# Patient Record
Sex: FEMALE | Race: WHITE | Employment: OTHER
[De-identification: names, ages, dates, MRNs, and addresses within clinical notes are randomized per-mention and may not be internally consistent; named-entity substitution may affect disease eponyms.]

---

## 2017-03-01 ENCOUNTER — TELEPHONE (OUTPATIENT)
Dept: PULMONOLOGY | Facility: CLINIC | Age: 62
End: 2017-03-01

## 2017-03-01 RX ORDER — LEVALBUTEROL INHALATION SOLUTION 0.63 MG/3ML
0.63 SOLUTION RESPIRATORY (INHALATION) EVERY 6 HOURS PRN
Qty: 100 VIAL | Refills: 3 | Status: SHIPPED | OUTPATIENT
Start: 2017-03-01 | End: 2018-07-11

## 2017-03-02 ENCOUNTER — TELEPHONE (OUTPATIENT)
Dept: PULMONOLOGY | Facility: CLINIC | Age: 62
End: 2017-03-02

## 2017-03-02 DIAGNOSIS — R06.02 SHORTNESS OF BREATH: Primary | ICD-10-CM

## 2017-03-03 ENCOUNTER — TELEPHONE (OUTPATIENT)
Dept: PULMONOLOGY | Facility: CLINIC | Age: 62
End: 2017-03-03

## 2017-03-03 DIAGNOSIS — J45.40 MODERATE PERSISTENT ASTHMA WITHOUT COMPLICATION: Primary | ICD-10-CM

## 2017-03-14 ENCOUNTER — OFFICE VISIT (OUTPATIENT)
Dept: PULMONOLOGY | Age: 62
End: 2017-03-14
Payer: COMMERCIAL

## 2017-03-14 ENCOUNTER — HOSPITAL ENCOUNTER (OUTPATIENT)
Age: 62
Setting detail: SPECIMEN
Discharge: HOME | End: 2017-03-14
Payer: COMMERCIAL

## 2017-03-14 VITALS
BODY MASS INDEX: 47.09 KG/M2 | SYSTOLIC BLOOD PRESSURE: 122 MMHG | WEIGHT: 293 LBS | RESPIRATION RATE: 16 BRPM | HEART RATE: 65 BPM | DIASTOLIC BLOOD PRESSURE: 76 MMHG | OXYGEN SATURATION: 98 % | HEIGHT: 66 IN

## 2017-03-14 DIAGNOSIS — J45.991 ASTHMA, COUGH VARIANT: ICD-10-CM

## 2017-03-14 DIAGNOSIS — R05.3 CHRONIC COUGH: Primary | ICD-10-CM

## 2017-03-14 DIAGNOSIS — J30.9 ALLERGIC RHINITIS, UNSPECIFIED ALLERGIC RHINITIS TRIGGER, UNSPECIFIED RHINITIS SEASONALITY: ICD-10-CM

## 2017-03-14 DIAGNOSIS — G47.33 OSA ON CPAP: ICD-10-CM

## 2017-03-14 DIAGNOSIS — Z99.89 OSA ON CPAP: ICD-10-CM

## 2017-03-14 DIAGNOSIS — R05.3 CHRONIC COUGH: ICD-10-CM

## 2017-03-14 DIAGNOSIS — K21.9 GASTROESOPHAGEAL REFLUX DISEASE WITHOUT ESOPHAGITIS: ICD-10-CM

## 2017-03-14 LAB
ABSOLUTE EOS #: 0.2 K/UL (ref 0–0.4)
ABSOLUTE LYMPH #: 2.8 K/UL (ref 1–4.8)
ABSOLUTE MONO #: 0.6 K/UL (ref 0.1–1.2)
BASOPHILS # BLD: 0 % (ref 0–2)
BASOPHILS ABSOLUTE: 0 K/UL (ref 0–0.2)
DIFFERENTIAL TYPE: ABNORMAL
EOSINOPHILS RELATIVE PERCENT: 3 % (ref 1–4)
HCT VFR BLD CALC: 45.5 % (ref 36–46)
HEMOGLOBIN: 15.3 G/DL (ref 12–16)
IGE: 18 IU/ML
LYMPHOCYTES # BLD: 30 % (ref 24–44)
MCH RBC QN AUTO: 28.9 PG (ref 26–34)
MCHC RBC AUTO-ENTMCNC: 33.6 G/DL (ref 31–37)
MCV RBC AUTO: 85.8 FL (ref 80–100)
MONOCYTES # BLD: 7 % (ref 2–11)
PDW BLD-RTO: 15 % (ref 12.5–15.4)
PLATELET # BLD: 166 K/UL (ref 140–450)
PLATELET ESTIMATE: ABNORMAL
PMV BLD AUTO: 9.9 FL (ref 6–12)
RBC # BLD: 5.31 M/UL (ref 4–5.2)
RBC # BLD: ABNORMAL 10*6/UL
SEG NEUTROPHILS: 60 % (ref 36–66)
SEGMENTED NEUTROPHILS ABSOLUTE COUNT: 5.7 K/UL (ref 1.8–7.7)
WBC # BLD: 9.4 K/UL (ref 3.5–11)
WBC # BLD: ABNORMAL 10*3/UL

## 2017-03-14 PROCEDURE — 99214 OFFICE O/P EST MOD 30 MIN: CPT | Performed by: INTERNAL MEDICINE

## 2017-03-14 RX ORDER — AZELASTINE 1 MG/ML
2 SPRAY, METERED NASAL 2 TIMES DAILY
Qty: 1 BOTTLE | Refills: 3 | Status: SHIPPED | OUTPATIENT
Start: 2017-03-14 | End: 2017-07-11

## 2017-03-14 RX ORDER — MONTELUKAST SODIUM 10 MG/1
10 TABLET ORAL DAILY
Qty: 30 TABLET | Refills: 11 | Status: SHIPPED | OUTPATIENT
Start: 2017-03-14 | End: 2017-07-11

## 2017-04-25 ENCOUNTER — OFFICE VISIT (OUTPATIENT)
Dept: PULMONOLOGY | Age: 62
End: 2017-04-25
Payer: COMMERCIAL

## 2017-04-25 VITALS
SYSTOLIC BLOOD PRESSURE: 118 MMHG | WEIGHT: 293 LBS | HEART RATE: 51 BPM | DIASTOLIC BLOOD PRESSURE: 60 MMHG | HEIGHT: 66 IN | OXYGEN SATURATION: 96 % | TEMPERATURE: 97.4 F | RESPIRATION RATE: 16 BRPM | BODY MASS INDEX: 47.09 KG/M2

## 2017-04-25 DIAGNOSIS — K21.9 GASTROESOPHAGEAL REFLUX DISEASE WITHOUT ESOPHAGITIS: ICD-10-CM

## 2017-04-25 DIAGNOSIS — J30.9 ALLERGIC RHINITIS, UNSPECIFIED ALLERGIC RHINITIS TRIGGER, UNSPECIFIED RHINITIS SEASONALITY: ICD-10-CM

## 2017-04-25 DIAGNOSIS — J45.991 COUGH VARIANT ASTHMA: Primary | ICD-10-CM

## 2017-04-25 DIAGNOSIS — G47.33 OSA ON CPAP: ICD-10-CM

## 2017-04-25 DIAGNOSIS — Z99.89 OSA ON CPAP: ICD-10-CM

## 2017-04-25 PROCEDURE — 99214 OFFICE O/P EST MOD 30 MIN: CPT | Performed by: INTERNAL MEDICINE

## 2017-04-25 ASSESSMENT — SLEEP AND FATIGUE QUESTIONNAIRES
HOW LIKELY ARE YOU TO NOD OFF OR FALL ASLEEP WHILE WATCHING TV: 0
HOW LIKELY ARE YOU TO NOD OFF OR FALL ASLEEP IN A CAR, WHILE STOPPED FOR A FEW MINUTES IN TRAFFIC: 0
ESS TOTAL SCORE: 0
HOW LIKELY ARE YOU TO NOD OFF OR FALL ASLEEP WHILE SITTING INACTIVE IN A PUBLIC PLACE: 0
HOW LIKELY ARE YOU TO NOD OFF OR FALL ASLEEP WHILE SITTING QUIETLY AFTER LUNCH WITHOUT ALCOHOL: 0
HOW LIKELY ARE YOU TO NOD OFF OR FALL ASLEEP WHILE LYING DOWN TO REST IN THE AFTERNOON WHEN CIRCUMSTANCES PERMIT: 0
HOW LIKELY ARE YOU TO NOD OFF OR FALL ASLEEP WHILE SITTING AND TALKING TO SOMEONE: 0
HOW LIKELY ARE YOU TO NOD OFF OR FALL ASLEEP WHEN YOU ARE A PASSENGER IN A CAR FOR AN HOUR WITHOUT A BREAK: 0
HOW LIKELY ARE YOU TO NOD OFF OR FALL ASLEEP WHILE SITTING AND READING: 0

## 2017-05-01 ENCOUNTER — APPOINTMENT (OUTPATIENT)
Dept: CT IMAGING | Age: 62
End: 2017-05-01
Payer: COMMERCIAL

## 2017-05-01 ENCOUNTER — HOSPITAL ENCOUNTER (EMERGENCY)
Age: 62
Discharge: HOME | End: 2017-05-02
Attending: EMERGENCY MEDICINE
Payer: COMMERCIAL

## 2017-05-01 VITALS
WEIGHT: 293 LBS | SYSTOLIC BLOOD PRESSURE: 135 MMHG | OXYGEN SATURATION: 97 % | RESPIRATION RATE: 22 BRPM | TEMPERATURE: 98 F | DIASTOLIC BLOOD PRESSURE: 54 MMHG | BODY MASS INDEX: 47.09 KG/M2 | HEIGHT: 66 IN | HEART RATE: 59 BPM

## 2017-05-01 DIAGNOSIS — R10.9 NONSPECIFIC ABDOMINAL PAIN: Primary | ICD-10-CM

## 2017-05-01 LAB
-: ABNORMAL
ABSOLUTE EOS #: 0.3 K/UL (ref 0–0.4)
ABSOLUTE LYMPH #: 2.7 K/UL (ref 1–4.8)
ABSOLUTE MONO #: 0.6 K/UL (ref 0.2–0.8)
AMORPHOUS: ABNORMAL
ANION GAP SERPL CALCULATED.3IONS-SCNC: 15 MMOL/L (ref 9–17)
BACTERIA: ABNORMAL
BASOPHILS # BLD: 0 %
BASOPHILS ABSOLUTE: 0 K/UL (ref 0–0.2)
BILIRUBIN URINE: NEGATIVE
BUN BLDV-MCNC: 21 MG/DL (ref 8–23)
BUN/CREAT BLD: 26 (ref 9–20)
CALCIUM SERPL-MCNC: 9.1 MG/DL (ref 8.6–10.4)
CASTS UA: ABNORMAL /LPF
CHLORIDE BLD-SCNC: 97 MMOL/L (ref 98–107)
CO2: 24 MMOL/L (ref 20–31)
COLOR: YELLOW
COMMENT UA: ABNORMAL
CREAT SERPL-MCNC: 0.81 MG/DL (ref 0.5–0.9)
CRYSTALS, UA: ABNORMAL /HPF
DIFFERENTIAL TYPE: ABNORMAL
EOSINOPHILS RELATIVE PERCENT: 3 %
EPITHELIAL CELLS UA: ABNORMAL /HPF
GFR AFRICAN AMERICAN: >60 ML/MIN
GFR NON-AFRICAN AMERICAN: >60 ML/MIN
GFR SERPL CREATININE-BSD FRML MDRD: ABNORMAL ML/MIN/{1.73_M2}
GFR SERPL CREATININE-BSD FRML MDRD: ABNORMAL ML/MIN/{1.73_M2}
GLUCOSE BLD-MCNC: 111 MG/DL (ref 70–99)
GLUCOSE URINE: NEGATIVE
HCT VFR BLD CALC: 44.1 % (ref 36–46)
HEMOGLOBIN: 14.9 G/DL (ref 12–16)
KETONES, URINE: NEGATIVE
LEUKOCYTE ESTERASE, URINE: NEGATIVE
LYMPHOCYTES # BLD: 32 %
MCH RBC QN AUTO: 29.2 PG (ref 26–34)
MCHC RBC AUTO-ENTMCNC: 33.8 G/DL (ref 31–37)
MCV RBC AUTO: 86.4 FL (ref 80–100)
MONOCYTES # BLD: 7 %
MUCUS: ABNORMAL
NITRITE, URINE: NEGATIVE
OTHER OBSERVATIONS UA: ABNORMAL
PDW BLD-RTO: 15.1 % (ref 11.5–14.5)
PH UA: 6 (ref 5–8)
PLATELET # BLD: 145 K/UL (ref 130–400)
PLATELET ESTIMATE: ABNORMAL
PMV BLD AUTO: 9.9 FL (ref 6–12)
POTASSIUM SERPL-SCNC: 4 MMOL/L (ref 3.7–5.3)
PROTEIN UA: NEGATIVE
RBC # BLD: 5.11 M/UL (ref 4–5.2)
RBC # BLD: ABNORMAL 10*6/UL
RBC UA: ABNORMAL /HPF (ref 0–2)
RENAL EPITHELIAL, UA: ABNORMAL /HPF
SEG NEUTROPHILS: 58 %
SEGMENTED NEUTROPHILS ABSOLUTE COUNT: 4.8 K/UL (ref 1.8–7.7)
SODIUM BLD-SCNC: 136 MMOL/L (ref 135–144)
SPECIFIC GRAVITY UA: 1.01 (ref 1–1.03)
TRICHOMONAS: ABNORMAL
TURBIDITY: CLEAR
URINE HGB: ABNORMAL
UROBILINOGEN, URINE: NORMAL
WBC # BLD: 8.4 K/UL (ref 3.5–11)
WBC # BLD: ABNORMAL 10*3/UL
WBC UA: ABNORMAL /HPF (ref 0–5)
YEAST: ABNORMAL

## 2017-05-01 PROCEDURE — 80048 BASIC METABOLIC PNL TOTAL CA: CPT

## 2017-05-01 PROCEDURE — 6360000002 HC RX W HCPCS: Performed by: EMERGENCY MEDICINE

## 2017-05-01 PROCEDURE — 74177 CT ABD & PELVIS W/CONTRAST: CPT

## 2017-05-01 PROCEDURE — 85025 COMPLETE CBC W/AUTO DIFF WBC: CPT

## 2017-05-01 PROCEDURE — 96375 TX/PRO/DX INJ NEW DRUG ADDON: CPT

## 2017-05-01 PROCEDURE — 81001 URINALYSIS AUTO W/SCOPE: CPT

## 2017-05-01 PROCEDURE — 99284 EMERGENCY DEPT VISIT MOD MDM: CPT

## 2017-05-01 PROCEDURE — 96374 THER/PROPH/DIAG INJ IV PUSH: CPT

## 2017-05-01 PROCEDURE — 6360000004 HC RX CONTRAST MEDICATION: Performed by: EMERGENCY MEDICINE

## 2017-05-01 RX ORDER — ONDANSETRON 2 MG/ML
4 INJECTION INTRAMUSCULAR; INTRAVENOUS ONCE
Status: COMPLETED | OUTPATIENT
Start: 2017-05-01 | End: 2017-05-01

## 2017-05-01 RX ADMIN — ONDANSETRON 4 MG: 2 INJECTION INTRAMUSCULAR; INTRAVENOUS at 22:52

## 2017-05-01 RX ADMIN — HYDROMORPHONE HYDROCHLORIDE 1 MG: 1 INJECTION, SOLUTION INTRAMUSCULAR; INTRAVENOUS; SUBCUTANEOUS at 22:52

## 2017-05-01 RX ADMIN — IOVERSOL 125 ML: 741 INJECTION INTRA-ARTERIAL; INTRAVENOUS at 23:31

## 2017-05-01 ASSESSMENT — PAIN SCALES - GENERAL: PAINLEVEL_OUTOF10: 8

## 2017-05-01 ASSESSMENT — PAIN DESCRIPTION - PAIN TYPE: TYPE: ACUTE PAIN

## 2017-05-01 ASSESSMENT — PAIN DESCRIPTION - LOCATION: LOCATION: ABDOMEN

## 2017-05-01 ASSESSMENT — PAIN DESCRIPTION - ORIENTATION: ORIENTATION: RIGHT;UPPER

## 2017-05-02 RX ORDER — DICYCLOMINE HYDROCHLORIDE 10 MG/1
10 CAPSULE ORAL EVERY 6 HOURS PRN
Qty: 20 CAPSULE | Refills: 0 | Status: SHIPPED | OUTPATIENT
Start: 2017-05-02 | End: 2017-07-11

## 2017-05-02 RX ORDER — ONDANSETRON 4 MG/1
4 TABLET, ORALLY DISINTEGRATING ORAL EVERY 8 HOURS PRN
Qty: 20 TABLET | Refills: 0 | Status: SHIPPED | OUTPATIENT
Start: 2017-05-02 | End: 2018-01-03 | Stop reason: ALTCHOICE

## 2017-05-09 ENCOUNTER — HOSPITAL ENCOUNTER (OUTPATIENT)
Dept: ULTRASOUND IMAGING | Age: 62
Discharge: HOME | End: 2017-05-09
Payer: COMMERCIAL

## 2017-05-09 ENCOUNTER — HOSPITAL ENCOUNTER (OUTPATIENT)
Age: 62
Discharge: HOME | End: 2017-05-09
Payer: COMMERCIAL

## 2017-05-09 DIAGNOSIS — E07.9 THYROID MASS: ICD-10-CM

## 2017-05-09 LAB
T3 TOTAL: 148 NG/DL (ref 80–200)
T4 TOTAL: 8.3 UG/DL (ref 4.5–12)
TSH SERPL DL<=0.05 MIU/L-ACNC: 8.42 MIU/L (ref 0.3–5)

## 2017-05-09 PROCEDURE — 84436 ASSAY OF TOTAL THYROXINE: CPT

## 2017-05-09 PROCEDURE — 36415 COLL VENOUS BLD VENIPUNCTURE: CPT

## 2017-05-09 PROCEDURE — 84480 ASSAY TRIIODOTHYRONINE (T3): CPT

## 2017-05-09 PROCEDURE — 84443 ASSAY THYROID STIM HORMONE: CPT

## 2017-05-09 PROCEDURE — 76536 US EXAM OF HEAD AND NECK: CPT

## 2017-06-05 ENCOUNTER — HOSPITAL ENCOUNTER (EMERGENCY)
Age: 62
Discharge: HOME | End: 2017-06-05
Attending: EMERGENCY MEDICINE
Payer: COMMERCIAL

## 2017-06-05 ENCOUNTER — APPOINTMENT (OUTPATIENT)
Dept: GENERAL RADIOLOGY | Age: 62
End: 2017-06-05
Payer: COMMERCIAL

## 2017-06-05 VITALS
TEMPERATURE: 97.7 F | HEIGHT: 66 IN | DIASTOLIC BLOOD PRESSURE: 69 MMHG | BODY MASS INDEX: 47.09 KG/M2 | HEART RATE: 66 BPM | RESPIRATION RATE: 16 BRPM | WEIGHT: 293 LBS | SYSTOLIC BLOOD PRESSURE: 151 MMHG | OXYGEN SATURATION: 96 %

## 2017-06-05 DIAGNOSIS — S80.812A ABRASION, LEFT LOWER LEG, INITIAL ENCOUNTER: ICD-10-CM

## 2017-06-05 DIAGNOSIS — S86.912A KNEE STRAIN, LEFT, INITIAL ENCOUNTER: Primary | ICD-10-CM

## 2017-06-05 PROCEDURE — 73562 X-RAY EXAM OF KNEE 3: CPT

## 2017-06-05 PROCEDURE — 90471 IMMUNIZATION ADMIN: CPT | Performed by: EMERGENCY MEDICINE

## 2017-06-05 PROCEDURE — 90715 TDAP VACCINE 7 YRS/> IM: CPT | Performed by: EMERGENCY MEDICINE

## 2017-06-05 PROCEDURE — 6360000002 HC RX W HCPCS: Performed by: EMERGENCY MEDICINE

## 2017-06-05 PROCEDURE — 99283 EMERGENCY DEPT VISIT LOW MDM: CPT

## 2017-06-05 PROCEDURE — 73590 X-RAY EXAM OF LOWER LEG: CPT

## 2017-06-05 RX ORDER — MELOXICAM 15 MG/1
15 TABLET ORAL DAILY
COMMUNITY
End: 2018-12-10 | Stop reason: ALTCHOICE

## 2017-06-05 RX ORDER — DOXYCYCLINE 100 MG/1
100 TABLET ORAL 2 TIMES DAILY
Qty: 14 TABLET | Refills: 0 | Status: SHIPPED | OUTPATIENT
Start: 2017-06-05 | End: 2017-06-15

## 2017-06-05 RX ORDER — LEVOTHYROXINE SODIUM 0.03 MG/1
50 TABLET ORAL DAILY
COMMUNITY
End: 2022-04-04

## 2017-06-05 RX ADMIN — TETANUS TOXOID, REDUCED DIPHTHERIA TOXOID AND ACELLULAR PERTUSSIS VACCINE, ADSORBED 0.5 ML: 5; 2.5; 8; 8; 2.5 SUSPENSION INTRAMUSCULAR at 21:31

## 2017-06-05 ASSESSMENT — PAIN SCALES - GENERAL: PAINLEVEL_OUTOF10: 9

## 2017-06-05 ASSESSMENT — PAIN DESCRIPTION - DESCRIPTORS: DESCRIPTORS: ACHING;SHARP;SHOOTING;STABBING;THROBBING

## 2017-06-05 ASSESSMENT — PAIN DESCRIPTION - ORIENTATION: ORIENTATION: LEFT;LOWER;ANTERIOR

## 2017-06-05 ASSESSMENT — PAIN DESCRIPTION - LOCATION: LOCATION: LEG

## 2017-06-05 ASSESSMENT — PAIN DESCRIPTION - PAIN TYPE: TYPE: ACUTE PAIN

## 2017-06-22 ENCOUNTER — APPOINTMENT (OUTPATIENT)
Dept: GENERAL RADIOLOGY | Age: 62
End: 2017-06-22
Payer: COMMERCIAL

## 2017-06-22 ENCOUNTER — HOSPITAL ENCOUNTER (EMERGENCY)
Age: 62
Discharge: HOME | End: 2017-06-22
Payer: COMMERCIAL

## 2017-06-22 VITALS
BODY MASS INDEX: 47.09 KG/M2 | WEIGHT: 293 LBS | OXYGEN SATURATION: 98 % | DIASTOLIC BLOOD PRESSURE: 80 MMHG | HEART RATE: 66 BPM | TEMPERATURE: 97.6 F | RESPIRATION RATE: 18 BRPM | SYSTOLIC BLOOD PRESSURE: 148 MMHG | HEIGHT: 66 IN

## 2017-06-22 DIAGNOSIS — G89.29 CHRONIC PAIN OF RIGHT KNEE: Primary | ICD-10-CM

## 2017-06-22 DIAGNOSIS — M25.561 CHRONIC PAIN OF RIGHT KNEE: Primary | ICD-10-CM

## 2017-06-22 PROCEDURE — 96372 THER/PROPH/DIAG INJ SC/IM: CPT

## 2017-06-22 PROCEDURE — 99283 EMERGENCY DEPT VISIT LOW MDM: CPT

## 2017-06-22 PROCEDURE — 6360000002 HC RX W HCPCS: Performed by: NURSE PRACTITIONER

## 2017-06-22 PROCEDURE — 73562 X-RAY EXAM OF KNEE 3: CPT

## 2017-06-22 RX ORDER — ORPHENADRINE CITRATE 30 MG/ML
60 INJECTION INTRAMUSCULAR; INTRAVENOUS ONCE
Status: COMPLETED | OUTPATIENT
Start: 2017-06-22 | End: 2017-06-22

## 2017-06-22 RX ORDER — KETOROLAC TROMETHAMINE 30 MG/ML
30 INJECTION, SOLUTION INTRAMUSCULAR; INTRAVENOUS ONCE
Status: COMPLETED | OUTPATIENT
Start: 2017-06-22 | End: 2017-06-22

## 2017-06-22 RX ADMIN — ORPHENADRINE CITRATE 60 MG: 30 INJECTION INTRAMUSCULAR; INTRAVENOUS at 17:41

## 2017-06-22 RX ADMIN — KETOROLAC TROMETHAMINE 30 MG: 30 INJECTION, SOLUTION INTRAMUSCULAR at 17:42

## 2017-06-22 ASSESSMENT — PAIN SCALES - GENERAL
PAINLEVEL_OUTOF10: 8
PAINLEVEL_OUTOF10: 8

## 2017-06-22 ASSESSMENT — PAIN DESCRIPTION - PAIN TYPE: TYPE: CHRONIC PAIN

## 2017-07-11 ENCOUNTER — HOSPITAL ENCOUNTER (OUTPATIENT)
Age: 62
Setting detail: OBSERVATION
Discharge: HOME | End: 2017-07-13
Attending: EMERGENCY MEDICINE | Admitting: FAMILY MEDICINE
Payer: COMMERCIAL

## 2017-07-11 ENCOUNTER — APPOINTMENT (OUTPATIENT)
Dept: GENERAL RADIOLOGY | Age: 62
End: 2017-07-11
Payer: COMMERCIAL

## 2017-07-11 DIAGNOSIS — R07.9 CHEST PAIN, UNSPECIFIED TYPE: Primary | ICD-10-CM

## 2017-07-11 LAB
ABSOLUTE EOS #: 0.2 K/UL (ref 0–0.4)
ABSOLUTE LYMPH #: 2.2 K/UL (ref 1–4.8)
ABSOLUTE MONO #: 0.4 K/UL (ref 0.2–0.8)
ANION GAP SERPL CALCULATED.3IONS-SCNC: 14 MMOL/L (ref 9–17)
BASOPHILS # BLD: 1 %
BASOPHILS ABSOLUTE: 0 K/UL (ref 0–0.2)
BUN BLDV-MCNC: 23 MG/DL (ref 8–23)
BUN/CREAT BLD: 30 (ref 9–20)
CALCIUM SERPL-MCNC: 9.1 MG/DL (ref 8.6–10.4)
CHLORIDE BLD-SCNC: 101 MMOL/L (ref 98–107)
CO2: 25 MMOL/L (ref 20–31)
CREAT SERPL-MCNC: 0.76 MG/DL (ref 0.5–0.9)
DIFFERENTIAL TYPE: ABNORMAL
EOSINOPHILS RELATIVE PERCENT: 3 %
GFR AFRICAN AMERICAN: >60 ML/MIN
GFR NON-AFRICAN AMERICAN: >60 ML/MIN
GFR SERPL CREATININE-BSD FRML MDRD: ABNORMAL ML/MIN/{1.73_M2}
GFR SERPL CREATININE-BSD FRML MDRD: ABNORMAL ML/MIN/{1.73_M2}
GLUCOSE BLD-MCNC: 128 MG/DL (ref 70–99)
HCT VFR BLD CALC: 43.1 % (ref 36–46)
HEMOGLOBIN: 14.6 G/DL (ref 12–16)
LYMPHOCYTES # BLD: 34 %
MCH RBC QN AUTO: 29.7 PG (ref 26–34)
MCHC RBC AUTO-ENTMCNC: 33.9 G/DL (ref 31–37)
MCV RBC AUTO: 87.5 FL (ref 80–100)
MONOCYTES # BLD: 6 %
PDW BLD-RTO: 15.1 % (ref 11.5–14.5)
PLATELET # BLD: 164 K/UL (ref 130–400)
PLATELET ESTIMATE: ABNORMAL
PMV BLD AUTO: 10.4 FL (ref 6–12)
POTASSIUM SERPL-SCNC: 4.1 MMOL/L (ref 3.7–5.3)
RBC # BLD: 4.93 M/UL (ref 4–5.2)
RBC # BLD: ABNORMAL 10*6/UL
SEG NEUTROPHILS: 56 %
SEGMENTED NEUTROPHILS ABSOLUTE COUNT: 3.7 K/UL (ref 1.8–7.7)
SODIUM BLD-SCNC: 140 MMOL/L (ref 135–144)
TROPONIN INTERP: NORMAL
TROPONIN INTERP: NORMAL
TROPONIN T: <0.03 NG/ML
TROPONIN T: <0.03 NG/ML
WBC # BLD: 6.6 K/UL (ref 3.5–11)
WBC # BLD: ABNORMAL 10*3/UL

## 2017-07-11 PROCEDURE — 6370000000 HC RX 637 (ALT 250 FOR IP): Performed by: EMERGENCY MEDICINE

## 2017-07-11 PROCEDURE — 84484 ASSAY OF TROPONIN QUANT: CPT

## 2017-07-11 PROCEDURE — G0378 HOSPITAL OBSERVATION PER HR: HCPCS

## 2017-07-11 PROCEDURE — 85025 COMPLETE CBC W/AUTO DIFF WBC: CPT

## 2017-07-11 PROCEDURE — 71020 XR CHEST STANDARD TWO VW: CPT

## 2017-07-11 PROCEDURE — 80048 BASIC METABOLIC PNL TOTAL CA: CPT

## 2017-07-11 PROCEDURE — 96374 THER/PROPH/DIAG INJ IV PUSH: CPT

## 2017-07-11 PROCEDURE — 93005 ELECTROCARDIOGRAM TRACING: CPT

## 2017-07-11 PROCEDURE — 36415 COLL VENOUS BLD VENIPUNCTURE: CPT

## 2017-07-11 PROCEDURE — 99285 EMERGENCY DEPT VISIT HI MDM: CPT

## 2017-07-11 PROCEDURE — 6360000002 HC RX W HCPCS: Performed by: EMERGENCY MEDICINE

## 2017-07-11 RX ORDER — ASPIRIN 81 MG/1
324 TABLET, CHEWABLE ORAL ONCE
Status: COMPLETED | OUTPATIENT
Start: 2017-07-11 | End: 2017-07-11

## 2017-07-11 RX ADMIN — ASPIRIN 81 MG 324 MG: 81 TABLET ORAL at 21:41

## 2017-07-11 RX ADMIN — HYDROMORPHONE HYDROCHLORIDE 1 MG: 1 INJECTION, SOLUTION INTRAMUSCULAR; INTRAVENOUS; SUBCUTANEOUS at 21:42

## 2017-07-11 ASSESSMENT — ENCOUNTER SYMPTOMS
DIARRHEA: 0
BACK PAIN: 0
SHORTNESS OF BREATH: 1
TROUBLE SWALLOWING: 0
CONSTIPATION: 0
VOMITING: 0
COUGH: 0
PHOTOPHOBIA: 0
RHINORRHEA: 0
NAUSEA: 0
ABDOMINAL PAIN: 0
SINUS PRESSURE: 0
BLOOD IN STOOL: 0
SORE THROAT: 0

## 2017-07-11 ASSESSMENT — HEART SCORE: ECG: 0

## 2017-07-11 ASSESSMENT — PAIN SCALES - GENERAL
PAINLEVEL_OUTOF10: 8
PAINLEVEL_OUTOF10: 4
PAINLEVEL_OUTOF10: 8

## 2017-07-11 ASSESSMENT — PAIN DESCRIPTION - PAIN TYPE: TYPE: ACUTE PAIN

## 2017-07-11 ASSESSMENT — PAIN DESCRIPTION - DESCRIPTORS: DESCRIPTORS: OTHER (COMMENT)

## 2017-07-11 ASSESSMENT — PAIN DESCRIPTION - LOCATION: LOCATION: CHEST

## 2017-07-12 ENCOUNTER — APPOINTMENT (OUTPATIENT)
Dept: NUCLEAR MEDICINE | Age: 62
End: 2017-07-12
Payer: COMMERCIAL

## 2017-07-12 LAB
ABSOLUTE EOS #: 0.15 K/UL (ref 0–0.4)
ABSOLUTE LYMPH #: 1.68 K/UL (ref 1–4.8)
ABSOLUTE MONO #: 0.36 K/UL (ref 0.2–0.8)
ANION GAP SERPL CALCULATED.3IONS-SCNC: 15 MMOL/L (ref 9–17)
BASOPHILS # BLD: 1 %
BASOPHILS ABSOLUTE: 0.05 K/UL (ref 0–0.2)
BUN BLDV-MCNC: 20 MG/DL (ref 8–23)
BUN/CREAT BLD: 29 (ref 9–20)
CALCIUM SERPL-MCNC: 8.5 MG/DL (ref 8.6–10.4)
CHLORIDE BLD-SCNC: 101 MMOL/L (ref 98–107)
CHOLESTEROL, FASTING: 175 MG/DL
CHOLESTEROL/HDL RATIO: 4.1
CO2: 20 MMOL/L (ref 20–31)
CREAT SERPL-MCNC: 0.7 MG/DL (ref 0.5–0.9)
DIFFERENTIAL TYPE: ABNORMAL
EKG ATRIAL RATE: 68 BPM
EKG P AXIS: 76 DEGREES
EKG P-R INTERVAL: 140 MS
EKG Q-T INTERVAL: 410 MS
EKG QRS DURATION: 88 MS
EKG QTC CALCULATION (BAZETT): 435 MS
EKG R AXIS: 51 DEGREES
EKG T AXIS: 63 DEGREES
EKG VENTRICULAR RATE: 68 BPM
EOSINOPHILS RELATIVE PERCENT: 3 %
GFR AFRICAN AMERICAN: >60 ML/MIN
GFR NON-AFRICAN AMERICAN: >60 ML/MIN
GFR SERPL CREATININE-BSD FRML MDRD: ABNORMAL ML/MIN/{1.73_M2}
GFR SERPL CREATININE-BSD FRML MDRD: ABNORMAL ML/MIN/{1.73_M2}
GLUCOSE BLD-MCNC: 186 MG/DL (ref 70–99)
HCT VFR BLD CALC: 40 % (ref 36–46)
HDLC SERPL-MCNC: 43 MG/DL
HEMOGLOBIN: 13.7 G/DL (ref 12–16)
LDL CHOLESTEROL: 97 MG/DL (ref 0–130)
LV EF: 65 %
LVEF MODALITY: NORMAL
LYMPHOCYTES # BLD: 33 %
MCH RBC QN AUTO: 29.8 PG (ref 26–34)
MCHC RBC AUTO-ENTMCNC: 34.3 G/DL (ref 31–37)
MCV RBC AUTO: 86.9 FL (ref 80–100)
MONOCYTES # BLD: 7 %
MYOGLOBIN: 30 NG/ML (ref 25–58)
MYOGLOBIN: 36 NG/ML (ref 25–58)
PDW BLD-RTO: 14.2 % (ref 11.5–14.5)
PLATELET # BLD: 117 K/UL (ref 130–400)
PLATELET ESTIMATE: ABNORMAL
PMV BLD AUTO: ABNORMAL FL (ref 6–12)
POTASSIUM SERPL-SCNC: 4.2 MMOL/L (ref 3.7–5.3)
RBC # BLD: 4.6 M/UL (ref 4–5.2)
RBC # BLD: ABNORMAL 10*6/UL
SEG NEUTROPHILS: 56 %
SEGMENTED NEUTROPHILS ABSOLUTE COUNT: 2.86 K/UL (ref 1.8–7.7)
SODIUM BLD-SCNC: 136 MMOL/L (ref 135–144)
TRIGLYCERIDE, FASTING: 173 MG/DL
TROPONIN INTERP: NORMAL
TROPONIN INTERP: NORMAL
TROPONIN T: <0.03 NG/ML
TROPONIN T: <0.03 NG/ML
VLDLC SERPL CALC-MCNC: ABNORMAL MG/DL (ref 1–30)
WBC # BLD: 5.1 K/UL (ref 3.5–11)
WBC # BLD: ABNORMAL 10*3/UL

## 2017-07-12 PROCEDURE — A9500 TC99M SESTAMIBI: HCPCS | Performed by: INTERNAL MEDICINE

## 2017-07-12 PROCEDURE — 36415 COLL VENOUS BLD VENIPUNCTURE: CPT

## 2017-07-12 PROCEDURE — 97165 OT EVAL LOW COMPLEX 30 MIN: CPT

## 2017-07-12 PROCEDURE — G8988 SELF CARE GOAL STATUS: HCPCS

## 2017-07-12 PROCEDURE — 6370000000 HC RX 637 (ALT 250 FOR IP): Performed by: FAMILY MEDICINE

## 2017-07-12 PROCEDURE — 93017 CV STRESS TEST TRACING ONLY: CPT

## 2017-07-12 PROCEDURE — 85025 COMPLETE CBC W/AUTO DIFF WBC: CPT

## 2017-07-12 PROCEDURE — 83874 ASSAY OF MYOGLOBIN: CPT

## 2017-07-12 PROCEDURE — 6360000002 HC RX W HCPCS: Performed by: INTERNAL MEDICINE

## 2017-07-12 PROCEDURE — 84484 ASSAY OF TROPONIN QUANT: CPT

## 2017-07-12 PROCEDURE — G8978 MOBILITY CURRENT STATUS: HCPCS

## 2017-07-12 PROCEDURE — G0378 HOSPITAL OBSERVATION PER HR: HCPCS

## 2017-07-12 PROCEDURE — 94640 AIRWAY INHALATION TREATMENT: CPT

## 2017-07-12 PROCEDURE — 97161 PT EVAL LOW COMPLEX 20 MIN: CPT

## 2017-07-12 PROCEDURE — 96372 THER/PROPH/DIAG INJ SC/IM: CPT

## 2017-07-12 PROCEDURE — G8989 SELF CARE D/C STATUS: HCPCS

## 2017-07-12 PROCEDURE — 80061 LIPID PANEL: CPT

## 2017-07-12 PROCEDURE — 93306 TTE W/DOPPLER COMPLETE: CPT

## 2017-07-12 PROCEDURE — 96376 TX/PRO/DX INJ SAME DRUG ADON: CPT

## 2017-07-12 PROCEDURE — G8979 MOBILITY GOAL STATUS: HCPCS

## 2017-07-12 PROCEDURE — 80048 BASIC METABOLIC PNL TOTAL CA: CPT

## 2017-07-12 PROCEDURE — 3430000000 HC RX DIAGNOSTIC RADIOPHARMACEUTICAL: Performed by: INTERNAL MEDICINE

## 2017-07-12 PROCEDURE — G8987 SELF CARE CURRENT STATUS: HCPCS

## 2017-07-12 PROCEDURE — 6360000002 HC RX W HCPCS: Performed by: FAMILY MEDICINE

## 2017-07-12 PROCEDURE — 2580000003 HC RX 258: Performed by: FAMILY MEDICINE

## 2017-07-12 PROCEDURE — 78452 HT MUSCLE IMAGE SPECT MULT: CPT

## 2017-07-12 RX ORDER — ONDANSETRON 4 MG/1
4 TABLET, ORALLY DISINTEGRATING ORAL EVERY 8 HOURS PRN
Status: DISCONTINUED | OUTPATIENT
Start: 2017-07-12 | End: 2017-07-12 | Stop reason: SDUPTHER

## 2017-07-12 RX ORDER — FAMOTIDINE 20 MG/1
20 TABLET, FILM COATED ORAL 2 TIMES DAILY
Status: DISCONTINUED | OUTPATIENT
Start: 2017-07-12 | End: 2017-07-13 | Stop reason: HOSPADM

## 2017-07-12 RX ORDER — 0.9 % SODIUM CHLORIDE 0.9 %
250 INTRAVENOUS SOLUTION INTRAVENOUS ONCE
Status: DISCONTINUED | OUTPATIENT
Start: 2017-07-12 | End: 2017-07-13 | Stop reason: HOSPADM

## 2017-07-12 RX ORDER — LEVALBUTEROL TARTRATE 45 UG/1
2 AEROSOL, METERED ORAL EVERY 6 HOURS PRN
Status: DISCONTINUED | OUTPATIENT
Start: 2017-07-12 | End: 2017-07-13 | Stop reason: HOSPADM

## 2017-07-12 RX ORDER — ASPIRIN 81 MG/1
81 TABLET ORAL DAILY
Status: DISCONTINUED | OUTPATIENT
Start: 2017-07-12 | End: 2017-07-13 | Stop reason: HOSPADM

## 2017-07-12 RX ORDER — METOPROLOL TARTRATE 5 MG/5ML
2.5 INJECTION INTRAVENOUS PRN
Status: ACTIVE | OUTPATIENT
Start: 2017-07-12 | End: 2017-07-13

## 2017-07-12 RX ORDER — SODIUM CHLORIDE 0.9 % (FLUSH) 0.9 %
10 SYRINGE (ML) INJECTION PRN
Status: DISCONTINUED | OUTPATIENT
Start: 2017-07-12 | End: 2017-07-13 | Stop reason: HOSPADM

## 2017-07-12 RX ORDER — NITROGLYCERIN 0.4 MG/1
0.4 TABLET SUBLINGUAL EVERY 5 MIN PRN
Status: ACTIVE | OUTPATIENT
Start: 2017-07-12 | End: 2017-07-13

## 2017-07-12 RX ORDER — SODIUM CHLORIDE 450 MG/100ML
INJECTION, SOLUTION INTRAVENOUS CONTINUOUS
Status: DISCONTINUED | OUTPATIENT
Start: 2017-07-12 | End: 2017-07-13 | Stop reason: HOSPADM

## 2017-07-12 RX ORDER — ACETAMINOPHEN 325 MG/1
650 TABLET ORAL EVERY 4 HOURS PRN
Status: DISCONTINUED | OUTPATIENT
Start: 2017-07-12 | End: 2017-07-13 | Stop reason: HOSPADM

## 2017-07-12 RX ORDER — LEVOTHYROXINE SODIUM 0.03 MG/1
25 TABLET ORAL DAILY
Status: DISCONTINUED | OUTPATIENT
Start: 2017-07-12 | End: 2017-07-13 | Stop reason: HOSPADM

## 2017-07-12 RX ORDER — MELOXICAM 7.5 MG/1
15 TABLET ORAL DAILY
Status: DISCONTINUED | OUTPATIENT
Start: 2017-07-12 | End: 2017-07-12

## 2017-07-12 RX ORDER — AMINOPHYLLINE DIHYDRATE 25 MG/ML
100 INJECTION, SOLUTION INTRAVENOUS
Status: ACTIVE | OUTPATIENT
Start: 2017-07-12 | End: 2017-07-12

## 2017-07-12 RX ORDER — AZELASTINE 1 MG/ML
2 SPRAY, METERED NASAL 2 TIMES DAILY
Status: DISCONTINUED | OUTPATIENT
Start: 2017-07-12 | End: 2017-07-12

## 2017-07-12 RX ORDER — LORAZEPAM 0.5 MG/1
0.5 TABLET ORAL 2 TIMES DAILY
Status: DISCONTINUED | OUTPATIENT
Start: 2017-07-12 | End: 2017-07-13 | Stop reason: HOSPADM

## 2017-07-12 RX ORDER — PANTOPRAZOLE SODIUM 40 MG/1
40 TABLET, DELAYED RELEASE ORAL
Status: DISCONTINUED | OUTPATIENT
Start: 2017-07-12 | End: 2017-07-13 | Stop reason: HOSPADM

## 2017-07-12 RX ORDER — SODIUM CHLORIDE 0.9 % (FLUSH) 0.9 %
10 SYRINGE (ML) INJECTION EVERY 12 HOURS SCHEDULED
Status: DISCONTINUED | OUTPATIENT
Start: 2017-07-12 | End: 2017-07-12 | Stop reason: SDUPTHER

## 2017-07-12 RX ORDER — SODIUM CHLORIDE 0.9 % (FLUSH) 0.9 %
10 SYRINGE (ML) INJECTION PRN
Status: DISCONTINUED | OUTPATIENT
Start: 2017-07-12 | End: 2017-07-12 | Stop reason: SDUPTHER

## 2017-07-12 RX ORDER — SODIUM CHLORIDE 0.9 % (FLUSH) 0.9 %
10 SYRINGE (ML) INJECTION PRN
Status: ACTIVE | OUTPATIENT
Start: 2017-07-12 | End: 2017-07-13

## 2017-07-12 RX ORDER — ALBUTEROL SULFATE 2.5 MG/3ML
2.5 SOLUTION RESPIRATORY (INHALATION)
Status: DISCONTINUED | OUTPATIENT
Start: 2017-07-12 | End: 2017-07-12

## 2017-07-12 RX ORDER — NAPROXEN 500 MG/1
500 TABLET ORAL 2 TIMES DAILY WITH MEALS
Status: DISCONTINUED | OUTPATIENT
Start: 2017-07-12 | End: 2017-07-13 | Stop reason: HOSPADM

## 2017-07-12 RX ORDER — SODIUM CHLORIDE 0.9 % (FLUSH) 0.9 %
10 SYRINGE (ML) INJECTION EVERY 12 HOURS SCHEDULED
Status: DISCONTINUED | OUTPATIENT
Start: 2017-07-12 | End: 2017-07-13 | Stop reason: HOSPADM

## 2017-07-12 RX ORDER — NITROGLYCERIN 0.4 MG/1
0.4 TABLET SUBLINGUAL EVERY 5 MIN PRN
Status: DISCONTINUED | OUTPATIENT
Start: 2017-07-12 | End: 2017-07-13 | Stop reason: HOSPADM

## 2017-07-12 RX ORDER — ONDANSETRON 2 MG/ML
4 INJECTION INTRAMUSCULAR; INTRAVENOUS EVERY 6 HOURS PRN
Status: DISCONTINUED | OUTPATIENT
Start: 2017-07-12 | End: 2017-07-13 | Stop reason: HOSPADM

## 2017-07-12 RX ORDER — ESCITALOPRAM OXALATE 10 MG/1
10 TABLET ORAL NIGHTLY
Status: DISCONTINUED | OUTPATIENT
Start: 2017-07-12 | End: 2017-07-13 | Stop reason: HOSPADM

## 2017-07-12 RX ORDER — SPIRONOLACTONE 25 MG/1
50 TABLET ORAL DAILY
Status: DISCONTINUED | OUTPATIENT
Start: 2017-07-12 | End: 2017-07-13 | Stop reason: HOSPADM

## 2017-07-12 RX ORDER — ATENOLOL 25 MG/1
12.5 TABLET ORAL NIGHTLY
Status: DISCONTINUED | OUTPATIENT
Start: 2017-07-12 | End: 2017-07-13 | Stop reason: HOSPADM

## 2017-07-12 RX ADMIN — ESCITALOPRAM OXALATE 10 MG: 10 TABLET ORAL at 21:24

## 2017-07-12 RX ADMIN — OLODATEROL RESPIMAT INHALATION SPRAY 2 PUFF: 2.5 SPRAY, METERED RESPIRATORY (INHALATION) at 09:28

## 2017-07-12 RX ADMIN — NAPROXEN 500 MG: 500 TABLET ORAL at 09:57

## 2017-07-12 RX ADMIN — LEVOTHYROXINE SODIUM 25 MCG: 25 TABLET ORAL at 09:01

## 2017-07-12 RX ADMIN — NAPROXEN 500 MG: 500 TABLET ORAL at 17:20

## 2017-07-12 RX ADMIN — ACETAMINOPHEN 650 MG: 325 TABLET ORAL at 19:42

## 2017-07-12 RX ADMIN — Medication 10 ML: at 21:25

## 2017-07-12 RX ADMIN — HYDROMORPHONE HYDROCHLORIDE 1 MG: 1 INJECTION, SOLUTION INTRAMUSCULAR; INTRAVENOUS; SUBCUTANEOUS at 05:27

## 2017-07-12 RX ADMIN — ENOXAPARIN SODIUM 40 MG: 40 INJECTION SUBCUTANEOUS at 09:02

## 2017-07-12 RX ADMIN — ATENOLOL 12.5 MG: 25 TABLET ORAL at 21:24

## 2017-07-12 RX ADMIN — ASPIRIN 81 MG: 81 TABLET, COATED ORAL at 09:02

## 2017-07-12 RX ADMIN — LORAZEPAM 0.5 MG: 0.5 TABLET ORAL at 21:25

## 2017-07-12 RX ADMIN — TETRAKIS(2-METHOXYISOBUTYLISOCYANIDE)COPPER(I) TETRAFLUOROBORATE 32.6 MILLICURIE: 1 INJECTION, POWDER, LYOPHILIZED, FOR SOLUTION INTRAVENOUS at 08:09

## 2017-07-12 RX ADMIN — SPIRONOLACTONE 50 MG: 25 TABLET, FILM COATED ORAL at 09:02

## 2017-07-12 RX ADMIN — REGADENOSON 0.4 MG: 0.08 INJECTION, SOLUTION INTRAVENOUS at 08:04

## 2017-07-12 RX ADMIN — SODIUM CHLORIDE: 4.5 INJECTION, SOLUTION INTRAVENOUS at 01:15

## 2017-07-12 RX ADMIN — FAMOTIDINE 20 MG: 20 TABLET ORAL at 09:02

## 2017-07-12 RX ADMIN — FAMOTIDINE 20 MG: 20 TABLET ORAL at 21:24

## 2017-07-12 RX ADMIN — PANTOPRAZOLE SODIUM 40 MG: 40 TABLET, DELAYED RELEASE ORAL at 09:02

## 2017-07-12 RX ADMIN — HYDROMORPHONE HYDROCHLORIDE 1 MG: 1 INJECTION, SOLUTION INTRAMUSCULAR; INTRAVENOUS; SUBCUTANEOUS at 09:57

## 2017-07-12 ASSESSMENT — PAIN SCALES - GENERAL
PAINLEVEL_OUTOF10: 4
PAINLEVEL_OUTOF10: 5
PAINLEVEL_OUTOF10: 5
PAINLEVEL_OUTOF10: 4
PAINLEVEL_OUTOF10: 7
PAINLEVEL_OUTOF10: 8
PAINLEVEL_OUTOF10: 8
PAINLEVEL_OUTOF10: 4
PAINLEVEL_OUTOF10: 3
PAINLEVEL_OUTOF10: 3

## 2017-07-12 ASSESSMENT — PAIN DESCRIPTION - DIRECTION
RADIATING_TOWARDS: LEFT ARM

## 2017-07-12 ASSESSMENT — PAIN DESCRIPTION - DESCRIPTORS
DESCRIPTORS: OTHER (COMMENT);TIGHTNESS
DESCRIPTORS: OTHER (COMMENT)

## 2017-07-12 ASSESSMENT — PAIN DESCRIPTION - PAIN TYPE
TYPE: ACUTE PAIN

## 2017-07-12 ASSESSMENT — PAIN DESCRIPTION - LOCATION
LOCATION: FLANK;CHEST
LOCATION: CHEST
LOCATION: HEAD;CHEST
LOCATION: CHEST
LOCATION: CHEST

## 2017-07-12 ASSESSMENT — PAIN DESCRIPTION - ORIENTATION
ORIENTATION: LEFT

## 2017-07-12 ASSESSMENT — PAIN DESCRIPTION - ONSET: ONSET: OTHER (COMMENT)

## 2017-07-13 VITALS
BODY MASS INDEX: 47.09 KG/M2 | WEIGHT: 293 LBS | HEART RATE: 56 BPM | DIASTOLIC BLOOD PRESSURE: 68 MMHG | HEIGHT: 66 IN | RESPIRATION RATE: 20 BRPM | TEMPERATURE: 98.1 F | SYSTOLIC BLOOD PRESSURE: 138 MMHG | OXYGEN SATURATION: 97 %

## 2017-07-13 LAB
LV EF: 73 %
LVEF MODALITY: NORMAL
MYOGLOBIN: 30 NG/ML (ref 25–58)
MYOGLOBIN: 31 NG/ML (ref 25–58)
MYOGLOBIN: 45 NG/ML (ref 25–58)
TROPONIN INTERP: NORMAL
TROPONIN T: <0.03 NG/ML

## 2017-07-13 PROCEDURE — A9500 TC99M SESTAMIBI: HCPCS | Performed by: INTERNAL MEDICINE

## 2017-07-13 PROCEDURE — 2580000003 HC RX 258: Performed by: FAMILY MEDICINE

## 2017-07-13 PROCEDURE — 3430000000 HC RX DIAGNOSTIC RADIOPHARMACEUTICAL: Performed by: INTERNAL MEDICINE

## 2017-07-13 PROCEDURE — 84484 ASSAY OF TROPONIN QUANT: CPT

## 2017-07-13 PROCEDURE — G0378 HOSPITAL OBSERVATION PER HR: HCPCS

## 2017-07-13 PROCEDURE — 83874 ASSAY OF MYOGLOBIN: CPT

## 2017-07-13 PROCEDURE — 97530 THERAPEUTIC ACTIVITIES: CPT

## 2017-07-13 PROCEDURE — 96372 THER/PROPH/DIAG INJ SC/IM: CPT

## 2017-07-13 PROCEDURE — 36415 COLL VENOUS BLD VENIPUNCTURE: CPT

## 2017-07-13 PROCEDURE — 6360000002 HC RX W HCPCS: Performed by: FAMILY MEDICINE

## 2017-07-13 PROCEDURE — 97116 GAIT TRAINING THERAPY: CPT

## 2017-07-13 PROCEDURE — 6370000000 HC RX 637 (ALT 250 FOR IP): Performed by: FAMILY MEDICINE

## 2017-07-13 PROCEDURE — 93005 ELECTROCARDIOGRAM TRACING: CPT

## 2017-07-13 PROCEDURE — 94640 AIRWAY INHALATION TREATMENT: CPT

## 2017-07-13 RX ORDER — SPIRONOLACTONE 50 MG/1
50 TABLET, FILM COATED ORAL DAILY
COMMUNITY

## 2017-07-13 RX ORDER — NAPROXEN 500 MG/1
500 TABLET ORAL 2 TIMES DAILY
COMMUNITY
End: 2018-01-03 | Stop reason: ALTCHOICE

## 2017-07-13 RX ADMIN — FAMOTIDINE 20 MG: 20 TABLET ORAL at 08:15

## 2017-07-13 RX ADMIN — ACETAMINOPHEN 650 MG: 325 TABLET ORAL at 08:15

## 2017-07-13 RX ADMIN — Medication 10 ML: at 08:15

## 2017-07-13 RX ADMIN — PANTOPRAZOLE SODIUM 40 MG: 40 TABLET, DELAYED RELEASE ORAL at 08:15

## 2017-07-13 RX ADMIN — ENOXAPARIN SODIUM 40 MG: 40 INJECTION SUBCUTANEOUS at 08:18

## 2017-07-13 RX ADMIN — ASPIRIN 81 MG: 81 TABLET, COATED ORAL at 08:15

## 2017-07-13 RX ADMIN — ACETAMINOPHEN 650 MG: 325 TABLET ORAL at 14:59

## 2017-07-13 RX ADMIN — TETRAKIS(2-METHOXYISOBUTYLISOCYANIDE)COPPER(I) TETRAFLUOROBORATE 37.4 MILLICURIE: 1 INJECTION, POWDER, LYOPHILIZED, FOR SOLUTION INTRAVENOUS at 08:05

## 2017-07-13 RX ADMIN — NAPROXEN 500 MG: 500 TABLET ORAL at 09:17

## 2017-07-13 RX ADMIN — LEVOTHYROXINE SODIUM 25 MCG: 25 TABLET ORAL at 06:19

## 2017-07-13 RX ADMIN — LORAZEPAM 0.5 MG: 0.5 TABLET ORAL at 08:15

## 2017-07-13 RX ADMIN — OLODATEROL RESPIMAT INHALATION SPRAY 2 PUFF: 2.5 SPRAY, METERED RESPIRATORY (INHALATION) at 09:10

## 2017-07-13 RX ADMIN — NAPROXEN 500 MG: 500 TABLET ORAL at 18:15

## 2017-07-13 RX ADMIN — SPIRONOLACTONE 50 MG: 25 TABLET, FILM COATED ORAL at 08:15

## 2017-07-13 ASSESSMENT — PAIN SCALES - GENERAL
PAINLEVEL_OUTOF10: 5
PAINLEVEL_OUTOF10: 6

## 2017-07-14 LAB
EKG ATRIAL RATE: 65 BPM
EKG P AXIS: 71 DEGREES
EKG P-R INTERVAL: 156 MS
EKG Q-T INTERVAL: 408 MS
EKG QRS DURATION: 80 MS
EKG QTC CALCULATION (BAZETT): 424 MS
EKG R AXIS: 49 DEGREES
EKG T AXIS: 48 DEGREES
EKG VENTRICULAR RATE: 65 BPM

## 2017-07-25 ENCOUNTER — HOSPITAL ENCOUNTER (OUTPATIENT)
Age: 62
Discharge: HOME | End: 2017-07-25
Payer: COMMERCIAL

## 2017-07-25 LAB
ALBUMIN SERPL-MCNC: 4.1 G/DL (ref 3.5–5.2)
ALBUMIN/GLOBULIN RATIO: ABNORMAL (ref 1–2.5)
ALP BLD-CCNC: 54 U/L (ref 35–104)
ALT SERPL-CCNC: 77 U/L (ref 5–33)
ANION GAP SERPL CALCULATED.3IONS-SCNC: 14 MMOL/L (ref 9–17)
AST SERPL-CCNC: 60 U/L
BILIRUB SERPL-MCNC: 0.6 MG/DL (ref 0.3–1.2)
BUN BLDV-MCNC: 19 MG/DL (ref 8–23)
BUN/CREAT BLD: 23 (ref 9–20)
CALCIUM SERPL-MCNC: 9.3 MG/DL (ref 8.6–10.4)
CHLORIDE BLD-SCNC: 100 MMOL/L (ref 98–107)
CHOLESTEROL/HDL RATIO: 4.1
CHOLESTEROL: 177 MG/DL
CO2: 25 MMOL/L (ref 20–31)
CREAT SERPL-MCNC: 0.82 MG/DL (ref 0.5–0.9)
GFR AFRICAN AMERICAN: >60 ML/MIN
GFR NON-AFRICAN AMERICAN: >60 ML/MIN
GFR SERPL CREATININE-BSD FRML MDRD: ABNORMAL ML/MIN/{1.73_M2}
GFR SERPL CREATININE-BSD FRML MDRD: ABNORMAL ML/MIN/{1.73_M2}
GLUCOSE BLD-MCNC: 133 MG/DL (ref 70–99)
HDLC SERPL-MCNC: 43 MG/DL
LDL CHOLESTEROL: 95 MG/DL (ref 0–130)
POTASSIUM SERPL-SCNC: 4.7 MMOL/L (ref 3.7–5.3)
SODIUM BLD-SCNC: 139 MMOL/L (ref 135–144)
TOTAL CK: 235 U/L (ref 26–192)
TOTAL PROTEIN: 7.3 G/DL (ref 6.4–8.3)
TRIGL SERPL-MCNC: 195 MG/DL
TSH SERPL DL<=0.05 MIU/L-ACNC: 2.29 MIU/L (ref 0.3–5)
VLDLC SERPL CALC-MCNC: ABNORMAL MG/DL (ref 1–30)

## 2017-07-25 PROCEDURE — 84480 ASSAY TRIIODOTHYRONINE (T3): CPT

## 2017-07-25 PROCEDURE — 84436 ASSAY OF TOTAL THYROXINE: CPT

## 2017-07-25 PROCEDURE — 82550 ASSAY OF CK (CPK): CPT

## 2017-07-25 PROCEDURE — 80053 COMPREHEN METABOLIC PANEL: CPT

## 2017-07-25 PROCEDURE — 80061 LIPID PANEL: CPT

## 2017-07-25 PROCEDURE — 36415 COLL VENOUS BLD VENIPUNCTURE: CPT

## 2017-07-25 PROCEDURE — 84443 ASSAY THYROID STIM HORMONE: CPT

## 2017-07-26 LAB
T3 TOTAL: 155 NG/DL (ref 80–200)
T4 TOTAL: 9.8 UG/DL (ref 4.5–12)

## 2017-08-23 ENCOUNTER — HOSPITAL ENCOUNTER (EMERGENCY)
Age: 62
Discharge: HOME | End: 2017-08-23
Attending: EMERGENCY MEDICINE
Payer: COMMERCIAL

## 2017-08-23 ENCOUNTER — APPOINTMENT (OUTPATIENT)
Dept: GENERAL RADIOLOGY | Age: 62
End: 2017-08-23
Payer: COMMERCIAL

## 2017-08-23 VITALS
RESPIRATION RATE: 18 BRPM | DIASTOLIC BLOOD PRESSURE: 64 MMHG | SYSTOLIC BLOOD PRESSURE: 135 MMHG | OXYGEN SATURATION: 98 % | WEIGHT: 293 LBS | TEMPERATURE: 97.5 F | HEIGHT: 67 IN | BODY MASS INDEX: 45.99 KG/M2 | HEART RATE: 69 BPM

## 2017-08-23 DIAGNOSIS — R42 LIGHTHEADEDNESS: Primary | ICD-10-CM

## 2017-08-23 LAB
% CKMB: 1.9 % (ref 0–3)
ABSOLUTE EOS #: 0.3 K/UL (ref 0–0.4)
ABSOLUTE LYMPH #: 2.4 K/UL (ref 1–4.8)
ABSOLUTE MONO #: 0.6 K/UL (ref 0.2–0.8)
ANION GAP SERPL CALCULATED.3IONS-SCNC: 13 MMOL/L (ref 9–17)
BASOPHILS # BLD: 0 %
BASOPHILS ABSOLUTE: 0 K/UL (ref 0–0.2)
BNP INTERPRETATION: NORMAL
BUN BLDV-MCNC: 14 MG/DL (ref 8–23)
BUN/CREAT BLD: 14 (ref 9–20)
CALCIUM SERPL-MCNC: 9.1 MG/DL (ref 8.6–10.4)
CHLORIDE BLD-SCNC: 99 MMOL/L (ref 98–107)
CK MB: 2.9 NG/ML
CKMB INTERPRETATION: NORMAL
CO2: 26 MMOL/L (ref 20–31)
CREAT SERPL-MCNC: 1.03 MG/DL (ref 0.5–0.9)
DIFFERENTIAL TYPE: ABNORMAL
EOSINOPHILS RELATIVE PERCENT: 3 %
GFR AFRICAN AMERICAN: >60 ML/MIN
GFR NON-AFRICAN AMERICAN: 54 ML/MIN
GFR SERPL CREATININE-BSD FRML MDRD: ABNORMAL ML/MIN/{1.73_M2}
GFR SERPL CREATININE-BSD FRML MDRD: ABNORMAL ML/MIN/{1.73_M2}
GLUCOSE BLD-MCNC: 130 MG/DL (ref 65–105)
GLUCOSE BLD-MCNC: 136 MG/DL (ref 70–99)
HCT VFR BLD CALC: 43.9 % (ref 36–46)
HEMOGLOBIN: 14.8 G/DL (ref 12–16)
LYMPHOCYTES # BLD: 28 %
MCH RBC QN AUTO: 29.2 PG (ref 26–34)
MCHC RBC AUTO-ENTMCNC: 33.7 G/DL (ref 31–37)
MCV RBC AUTO: 86.8 FL (ref 80–100)
MONOCYTES # BLD: 7 %
PDW BLD-RTO: 14.7 % (ref 11.5–14.5)
PLATELET # BLD: 147 K/UL (ref 130–400)
PLATELET ESTIMATE: ABNORMAL
PMV BLD AUTO: 9.6 FL (ref 6–12)
POTASSIUM SERPL-SCNC: 4.2 MMOL/L (ref 3.7–5.3)
PRO-BNP: 223 PG/ML
RBC # BLD: 5.06 M/UL (ref 4–5.2)
RBC # BLD: ABNORMAL 10*6/UL
SEG NEUTROPHILS: 62 %
SEGMENTED NEUTROPHILS ABSOLUTE COUNT: 5.2 K/UL (ref 1.8–7.7)
SODIUM BLD-SCNC: 138 MMOL/L (ref 135–144)
TOTAL CK: 152 U/L (ref 26–192)
TROPONIN INTERP: NORMAL
TROPONIN INTERP: NORMAL
TROPONIN T: <0.03 NG/ML
TROPONIN T: <0.03 NG/ML
WBC # BLD: 8.5 K/UL (ref 3.5–11)
WBC # BLD: ABNORMAL 10*3/UL

## 2017-08-23 PROCEDURE — 85025 COMPLETE CBC W/AUTO DIFF WBC: CPT

## 2017-08-23 PROCEDURE — 71020 XR CHEST STANDARD TWO VW: CPT

## 2017-08-23 PROCEDURE — 80048 BASIC METABOLIC PNL TOTAL CA: CPT

## 2017-08-23 PROCEDURE — 82550 ASSAY OF CK (CPK): CPT

## 2017-08-23 PROCEDURE — 84484 ASSAY OF TROPONIN QUANT: CPT

## 2017-08-23 PROCEDURE — 36415 COLL VENOUS BLD VENIPUNCTURE: CPT

## 2017-08-23 PROCEDURE — 2580000003 HC RX 258: Performed by: EMERGENCY MEDICINE

## 2017-08-23 PROCEDURE — 82947 ASSAY GLUCOSE BLOOD QUANT: CPT

## 2017-08-23 PROCEDURE — 83880 ASSAY OF NATRIURETIC PEPTIDE: CPT

## 2017-08-23 PROCEDURE — 82553 CREATINE MB FRACTION: CPT

## 2017-08-23 PROCEDURE — 93005 ELECTROCARDIOGRAM TRACING: CPT

## 2017-08-23 PROCEDURE — 99284 EMERGENCY DEPT VISIT MOD MDM: CPT

## 2017-08-23 RX ORDER — 0.9 % SODIUM CHLORIDE 0.9 %
1000 INTRAVENOUS SOLUTION INTRAVENOUS ONCE
Status: COMPLETED | OUTPATIENT
Start: 2017-08-23 | End: 2017-08-23

## 2017-08-23 RX ADMIN — SODIUM CHLORIDE 1000 ML: 9 INJECTION, SOLUTION INTRAVENOUS at 03:34

## 2017-08-25 LAB
EKG ATRIAL RATE: 62 BPM
EKG P AXIS: 37 DEGREES
EKG P-R INTERVAL: 144 MS
EKG Q-T INTERVAL: 422 MS
EKG QRS DURATION: 82 MS
EKG QTC CALCULATION (BAZETT): 428 MS
EKG R AXIS: 45 DEGREES
EKG T AXIS: 54 DEGREES
EKG VENTRICULAR RATE: 62 BPM

## 2017-10-21 ENCOUNTER — HOSPITAL ENCOUNTER (EMERGENCY)
Age: 62
Discharge: HOME | DRG: 198 | End: 2017-10-22
Attending: EMERGENCY MEDICINE
Payer: COMMERCIAL

## 2017-10-21 DIAGNOSIS — R07.89 ATYPICAL CHEST PAIN: Primary | ICD-10-CM

## 2017-10-21 DIAGNOSIS — F41.8 SITUATIONAL ANXIETY: ICD-10-CM

## 2017-10-21 LAB
EKG ATRIAL RATE: 81 BPM
EKG P AXIS: 60 DEGREES
EKG P-R INTERVAL: 132 MS
EKG Q-T INTERVAL: 390 MS
EKG QRS DURATION: 82 MS
EKG QTC CALCULATION (BAZETT): 453 MS
EKG R AXIS: 57 DEGREES
EKG T AXIS: 74 DEGREES
EKG VENTRICULAR RATE: 81 BPM

## 2017-10-21 PROCEDURE — 99285 EMERGENCY DEPT VISIT HI MDM: CPT

## 2017-10-21 PROCEDURE — 96375 TX/PRO/DX INJ NEW DRUG ADDON: CPT

## 2017-10-21 PROCEDURE — 2580000003 HC RX 258: Performed by: EMERGENCY MEDICINE

## 2017-10-21 PROCEDURE — 96374 THER/PROPH/DIAG INJ IV PUSH: CPT

## 2017-10-21 PROCEDURE — 93005 ELECTROCARDIOGRAM TRACING: CPT

## 2017-10-21 PROCEDURE — 96361 HYDRATE IV INFUSION ADD-ON: CPT

## 2017-10-21 PROCEDURE — 6360000002 HC RX W HCPCS: Performed by: EMERGENCY MEDICINE

## 2017-10-21 RX ORDER — ONDANSETRON 2 MG/ML
4 INJECTION INTRAMUSCULAR; INTRAVENOUS ONCE
Status: COMPLETED | OUTPATIENT
Start: 2017-10-22 | End: 2017-10-21

## 2017-10-21 RX ORDER — LORAZEPAM 2 MG/ML
1 INJECTION INTRAMUSCULAR ONCE
Status: COMPLETED | OUTPATIENT
Start: 2017-10-22 | End: 2017-10-21

## 2017-10-21 RX ORDER — SODIUM CHLORIDE 9 MG/ML
INJECTION, SOLUTION INTRAVENOUS CONTINUOUS
Status: DISCONTINUED | OUTPATIENT
Start: 2017-10-22 | End: 2017-10-22 | Stop reason: HOSPADM

## 2017-10-21 RX ADMIN — LORAZEPAM 1 MG: 2 INJECTION, SOLUTION INTRAMUSCULAR; INTRAVENOUS at 23:59

## 2017-10-21 RX ADMIN — ONDANSETRON 4 MG: 2 INJECTION INTRAMUSCULAR; INTRAVENOUS at 23:57

## 2017-10-21 RX ADMIN — SODIUM CHLORIDE: 9 INJECTION, SOLUTION INTRAVENOUS at 23:57

## 2017-10-21 ASSESSMENT — PAIN DESCRIPTION - LOCATION: LOCATION: HEAD

## 2017-10-21 ASSESSMENT — PAIN SCALES - GENERAL: PAINLEVEL_OUTOF10: 10

## 2017-10-22 VITALS
DIASTOLIC BLOOD PRESSURE: 50 MMHG | HEART RATE: 74 BPM | RESPIRATION RATE: 14 BRPM | BODY MASS INDEX: 47.09 KG/M2 | SYSTOLIC BLOOD PRESSURE: 116 MMHG | HEIGHT: 66 IN | TEMPERATURE: 97.9 F | WEIGHT: 293 LBS | OXYGEN SATURATION: 98 %

## 2017-10-22 LAB
ABSOLUTE EOS #: 0.2 K/UL (ref 0–0.4)
ABSOLUTE IMMATURE GRANULOCYTE: ABNORMAL K/UL (ref 0–0.3)
ABSOLUTE LYMPH #: 2.3 K/UL (ref 1–4.8)
ABSOLUTE MONO #: 0.7 K/UL (ref 0.2–0.8)
ANION GAP SERPL CALCULATED.3IONS-SCNC: 15 MMOL/L (ref 9–17)
BASOPHILS # BLD: 0 %
BASOPHILS ABSOLUTE: 0 K/UL (ref 0–0.2)
BUN BLDV-MCNC: 20 MG/DL (ref 8–23)
BUN/CREAT BLD: 16 (ref 9–20)
CALCIUM SERPL-MCNC: 8.9 MG/DL (ref 8.6–10.4)
CHLORIDE BLD-SCNC: 98 MMOL/L (ref 98–107)
CO2: 24 MMOL/L (ref 20–31)
CREAT SERPL-MCNC: 1.26 MG/DL (ref 0.5–0.9)
DIFFERENTIAL TYPE: ABNORMAL
EOSINOPHILS RELATIVE PERCENT: 2 %
GFR AFRICAN AMERICAN: 52 ML/MIN
GFR NON-AFRICAN AMERICAN: 43 ML/MIN
GFR SERPL CREATININE-BSD FRML MDRD: ABNORMAL ML/MIN/{1.73_M2}
GFR SERPL CREATININE-BSD FRML MDRD: ABNORMAL ML/MIN/{1.73_M2}
GLUCOSE BLD-MCNC: 143 MG/DL (ref 70–99)
HCT VFR BLD CALC: 43.4 % (ref 36–46)
HEMOGLOBIN: 14.6 G/DL (ref 12–16)
IMMATURE GRANULOCYTES: ABNORMAL %
LYMPHOCYTES # BLD: 22 %
MCH RBC QN AUTO: 29.4 PG (ref 26–34)
MCHC RBC AUTO-ENTMCNC: 33.7 G/DL (ref 31–37)
MCV RBC AUTO: 87.5 FL (ref 80–100)
MONOCYTES # BLD: 6 %
MYOGLOBIN: 95 NG/ML (ref 25–58)
PDW BLD-RTO: 15.2 % (ref 11.5–14.5)
PLATELET # BLD: 187 K/UL (ref 130–400)
PLATELET ESTIMATE: ABNORMAL
PMV BLD AUTO: 10.1 FL (ref 6–12)
POTASSIUM SERPL-SCNC: 4.7 MMOL/L (ref 3.7–5.3)
RBC # BLD: 4.96 M/UL (ref 4–5.2)
RBC # BLD: ABNORMAL 10*6/UL
SEG NEUTROPHILS: 70 %
SEGMENTED NEUTROPHILS ABSOLUTE COUNT: 7.2 K/UL (ref 1.8–7.7)
SODIUM BLD-SCNC: 137 MMOL/L (ref 135–144)
TROPONIN INTERP: NORMAL
TROPONIN INTERP: NORMAL
TROPONIN T: <0.03 NG/ML
TROPONIN T: <0.03 NG/ML
WBC # BLD: 10.4 K/UL (ref 3.5–11)
WBC # BLD: ABNORMAL 10*3/UL

## 2017-10-22 PROCEDURE — 36415 COLL VENOUS BLD VENIPUNCTURE: CPT

## 2017-10-22 PROCEDURE — 84484 ASSAY OF TROPONIN QUANT: CPT

## 2017-10-22 PROCEDURE — 80048 BASIC METABOLIC PNL TOTAL CA: CPT

## 2017-10-22 PROCEDURE — 96361 HYDRATE IV INFUSION ADD-ON: CPT

## 2017-10-22 PROCEDURE — 6360000002 HC RX W HCPCS: Performed by: EMERGENCY MEDICINE

## 2017-10-22 PROCEDURE — 96375 TX/PRO/DX INJ NEW DRUG ADDON: CPT

## 2017-10-22 PROCEDURE — 85025 COMPLETE CBC W/AUTO DIFF WBC: CPT

## 2017-10-22 PROCEDURE — 96376 TX/PRO/DX INJ SAME DRUG ADON: CPT

## 2017-10-22 PROCEDURE — 83874 ASSAY OF MYOGLOBIN: CPT

## 2017-10-22 RX ADMIN — HYDROMORPHONE HYDROCHLORIDE 1 MG: 1 INJECTION, SOLUTION INTRAMUSCULAR; INTRAVENOUS; SUBCUTANEOUS at 00:33

## 2017-10-22 RX ADMIN — HYDROMORPHONE HYDROCHLORIDE 0.5 MG: 1 INJECTION, SOLUTION INTRAMUSCULAR; INTRAVENOUS; SUBCUTANEOUS at 03:14

## 2017-10-22 ASSESSMENT — PAIN SCALES - GENERAL
PAINLEVEL_OUTOF10: 6
PAINLEVEL_OUTOF10: 1
PAINLEVEL_OUTOF10: 7

## 2017-10-22 ASSESSMENT — PAIN DESCRIPTION - LOCATION: LOCATION: CHEST

## 2017-10-22 NOTE — ED PROVIDER NOTES
[address] [place] [address] ED  eMERGENCY dEPARTMENT eNCOUnter      Pt Name: Malachy Dakins  MRN: [id number]  [place] 1955  Date of evaluation: 10/21/2017  Provider: Charan Fisher MD    CHIEF COMPLAINT       Chief Complaint   Patient presents with    Chest Pain    Headache         HISTORY OF PRESENT ILLNESS   (Location/Symptom, Timing/Onset, Context/Setting, Quality, Duration, Modifying Factors, Severity)  Note limiting factors. Malachy Dakins is a 65 y.o. female who presents to the emergency department With a chief complaint of anterior chest pain that is nonradiating. Patient states the pain started couple hours ago while she was having a verbal argument with her . She then left her house and walked over to the hospital a few miles away and is diaphoretic which she ascribes to the activity. She is status post coronary artery bypass graft in 2013 but states that she is otherwise stable from her cardiac standpoint. The history is provided by the patient. Nursing Notes were reviewed. REVIEW OF SYSTEMS    (2-9 systems for level 4, 10 or more for level 5)     Review of Systems   All other systems reviewed and are negative. Except as noted above the remainder of the review of systems was reviewed and negative.        PAST MEDICAL HISTORY     Past Medical History:   Diagnosis Date    Allergic rhinitis     Arthritis     Arthritis     Asthma     CAD (coronary artery disease) 8/6/13    cabg x 2    Chlorine inhalation lung injury ([address]     Severe irritability and airway reactivity     Chronic back pain     Dyslipidemia     Dyspnea on exertion     Eczema     GERD (gastroesophageal reflux disease)     Headache     MIGRAINES    HTN (hypertension)     MVP (mitral valve prolapse)     Obesity     Poison ivy     States in lungs    Sleep apnea     COMPLIANT ON CPAP     Thoracic outlet syndrome     bilateral         SURGICAL HISTORY       Past Surgical History:   Procedure Laterality Date    HENT:   Head: Normocephalic. Right Ear: External ear normal.   Left Ear: External ear normal.   Nose: Nose normal.   Mouth/Throat: Oropharynx is clear and moist.   Eyes: EOM are normal. Pupils are equal, round, and reactive to light. Neck: Neck supple. Cardiovascular: Normal rate, regular rhythm and normal heart sounds. No murmur heard. Pulmonary/Chest: Effort normal and breath sounds normal. No respiratory distress. She exhibits no tenderness. Abdominal: Soft. There is no tenderness. Musculoskeletal: Normal range of motion. She exhibits no tenderness. Neurological: She is alert and oriented to person, place, and time. No cranial nerve deficit. She exhibits normal muscle tone. Coordination normal.   Skin: Skin is warm and dry. She is not diaphoretic. No pallor. Nursing note and vitals reviewed. DIAGNOSTIC RESULTS     EKG: All EKG's are interpreted by the Emergency Department Physician who either signs or Co-signs this chart in the absence of a cardiologist.    Normal sinus rhythm with a heart rate of 81 beats a minute. No ischemic change.     RADIOLOGY:   Non-plain film images such as CT, Ultrasound and MRI are read by the radiologist. Plain radiographic images are visualized and preliminarily interpreted by the emergency physician with the below findings:      Interpretation per the Radiologist below, if available at the time of this note:    No orders to display         ED BEDSIDE ULTRASOUND:   Performed by ED Physician - none    LABS:  Labs Reviewed   CBC WITH AUTO DIFFERENTIAL - Abnormal; Notable for the following:        Result Value    RDW 15.2 (*)     All other components within normal limits   BASIC METABOLIC PANEL - Abnormal; Notable for the following:     Glucose 143 (*)     CREATININE 1.26 (*)     GFR Non- 43 (*)     GFR  52 (*)     All other components within normal limits   MYOGLOBIN, SERUM - Abnormal; Notable for the following:     Myoglobin 95 (*)     All other components within normal limits   TROPONIN   TROPONIN       All other labs were within normal range or not returned as of this dictation. EMERGENCY DEPARTMENT COURSE and DIFFERENTIAL DIAGNOSIS/MDM:   Vitals:    Vitals:    10/22/17 0342 10/22/17 0356 10/22/17 0411 10/22/17 0427   BP: (!) 110/42 (!) 119/49 110/62 (!) 116/50   Pulse: 68 71 68 74   Resp: 14 13 14 14   Temp:       TempSrc:       SpO2: 98% 97% 97% 98%   Weight:       Height:           2 sets of troponins remained within normal limits. Patient was also administered Ativan in the ED and is resting comfortably. Her pain is minimal at this time is felt to be stress induced. She is discharged in stable condition and is referred to early follow-up. MDM    CONSULTS:  None    PROCEDURES:  Unless otherwise noted below, none     Procedures    FINAL IMPRESSION      1. Atypical chest pain    2.  Situational anxiety          DISPOSITION/PLAN   DISPOSITION Decision to Discharge    PATIENT REFERRED TO:  Sadi Henriquez [address] [place] [id number]  [phone]    Schedule an appointment as soon as possible for a visit       [address] ED  [facility]  [phone]    As needed, If symptoms worsen      DISCHARGE MEDICATIONS:  Discharge Medication List as of 10/22/2017  4:30 AM                 Summation      Patient Course:  Improved    ED Medications administered this visit:    Medications   ondansetron (ZOFRAN) injection 4 mg (4 mg Intravenous Given 10/21/17 2357)   LORazepam (ATIVAN) injection 1 mg (1 mg Intravenous Given 10/21/17 2359)   HYDROmorphone (DILAUDID) injection 1 mg (1 mg Intravenous Given 10/22/17 0033)   HYDROmorphone (DILAUDID) injection 0.5 mg (0.5 mg Intravenous Given 10/22/17 0314)       New Prescriptions from this visit:    Discharge Medication List as of 10/22/2017  4:30 AM          Follow-up:  Sadi Henriquez MD  [address] [place] [address]

## 2017-10-24 ENCOUNTER — APPOINTMENT (OUTPATIENT)
Dept: GENERAL RADIOLOGY | Age: 62
DRG: 198 | End: 2017-10-24
Payer: COMMERCIAL

## 2017-10-24 ENCOUNTER — HOSPITAL ENCOUNTER (INPATIENT)
Age: 62
LOS: 1 days | Discharge: HOME | DRG: 198 | End: 2017-10-25
Attending: EMERGENCY MEDICINE | Admitting: FAMILY MEDICINE
Payer: COMMERCIAL

## 2017-10-24 DIAGNOSIS — R07.9 CHEST PAIN, UNSPECIFIED TYPE: Primary | ICD-10-CM

## 2017-10-24 LAB
ABSOLUTE EOS #: 0.2 K/UL (ref 0–0.4)
ABSOLUTE IMMATURE GRANULOCYTE: ABNORMAL K/UL (ref 0–0.3)
ABSOLUTE LYMPH #: 2.3 K/UL (ref 1–4.8)
ABSOLUTE MONO #: 0.5 K/UL (ref 0.2–0.8)
AMYLASE: 34 U/L (ref 28–100)
ANION GAP SERPL CALCULATED.3IONS-SCNC: 12 MMOL/L (ref 9–17)
BASOPHILS # BLD: 1 %
BASOPHILS ABSOLUTE: 0 K/UL (ref 0–0.2)
BNP INTERPRETATION: NORMAL
BUN BLDV-MCNC: 21 MG/DL (ref 8–23)
BUN/CREAT BLD: 19 (ref 9–20)
CALCIUM SERPL-MCNC: 9.1 MG/DL (ref 8.6–10.4)
CHLORIDE BLD-SCNC: 101 MMOL/L (ref 98–107)
CO2: 23 MMOL/L (ref 20–31)
CREAT SERPL-MCNC: 1.08 MG/DL (ref 0.5–0.9)
DIFFERENTIAL TYPE: ABNORMAL
EOSINOPHILS RELATIVE PERCENT: 3 %
GFR AFRICAN AMERICAN: >60 ML/MIN
GFR NON-AFRICAN AMERICAN: 52 ML/MIN
GFR SERPL CREATININE-BSD FRML MDRD: ABNORMAL ML/MIN/{1.73_M2}
GFR SERPL CREATININE-BSD FRML MDRD: ABNORMAL ML/MIN/{1.73_M2}
GLUCOSE BLD-MCNC: 146 MG/DL (ref 70–99)
HCT VFR BLD CALC: 41.6 % (ref 36–46)
HEMOGLOBIN: 14.1 G/DL (ref 12–16)
IMMATURE GRANULOCYTES: ABNORMAL %
LIPASE: 19 U/L (ref 13–60)
LYMPHOCYTES # BLD: 36 %
MCH RBC QN AUTO: 29.6 PG (ref 26–34)
MCHC RBC AUTO-ENTMCNC: 33.8 G/DL (ref 31–37)
MCV RBC AUTO: 87.6 FL (ref 80–100)
MONOCYTES # BLD: 8 %
MYOGLOBIN: 42 NG/ML (ref 25–58)
MYOGLOBIN: 46 NG/ML (ref 25–58)
PDW BLD-RTO: 15.1 % (ref 11.5–14.5)
PLATELET # BLD: 152 K/UL (ref 130–400)
PLATELET ESTIMATE: ABNORMAL
PMV BLD AUTO: 10.1 FL (ref 6–12)
POTASSIUM SERPL-SCNC: 4.1 MMOL/L (ref 3.7–5.3)
PRO-BNP: 230 PG/ML
RBC # BLD: 4.75 M/UL (ref 4–5.2)
RBC # BLD: ABNORMAL 10*6/UL
SEG NEUTROPHILS: 52 %
SEGMENTED NEUTROPHILS ABSOLUTE COUNT: 3.5 K/UL (ref 1.8–7.7)
SODIUM BLD-SCNC: 136 MMOL/L (ref 135–144)
TROPONIN INTERP: NORMAL
TROPONIN INTERP: NORMAL
TROPONIN T: <0.03 NG/ML
TROPONIN T: <0.03 NG/ML
WBC # BLD: 6.5 K/UL (ref 3.5–11)
WBC # BLD: ABNORMAL 10*3/UL

## 2017-10-24 PROCEDURE — 96376 TX/PRO/DX INJ SAME DRUG ADON: CPT

## 2017-10-24 PROCEDURE — 71010 XR CHEST PORTABLE: CPT

## 2017-10-24 PROCEDURE — 2060000000 HC ICU INTERMEDIATE R&B

## 2017-10-24 PROCEDURE — 84484 ASSAY OF TROPONIN QUANT: CPT

## 2017-10-24 PROCEDURE — 99285 EMERGENCY DEPT VISIT HI MDM: CPT

## 2017-10-24 PROCEDURE — 6370000000 HC RX 637 (ALT 250 FOR IP): Performed by: NURSE PRACTITIONER

## 2017-10-24 PROCEDURE — 93005 ELECTROCARDIOGRAM TRACING: CPT

## 2017-10-24 PROCEDURE — 6360000002 HC RX W HCPCS: Performed by: NURSE PRACTITIONER

## 2017-10-24 PROCEDURE — 83690 ASSAY OF LIPASE: CPT

## 2017-10-24 PROCEDURE — 80048 BASIC METABOLIC PNL TOTAL CA: CPT

## 2017-10-24 PROCEDURE — 96374 THER/PROPH/DIAG INJ IV PUSH: CPT

## 2017-10-24 PROCEDURE — 83036 HEMOGLOBIN GLYCOSYLATED A1C: CPT

## 2017-10-24 PROCEDURE — 6370000000 HC RX 637 (ALT 250 FOR IP): Performed by: FAMILY MEDICINE

## 2017-10-24 PROCEDURE — 82150 ASSAY OF AMYLASE: CPT

## 2017-10-24 PROCEDURE — 83874 ASSAY OF MYOGLOBIN: CPT

## 2017-10-24 PROCEDURE — 85025 COMPLETE CBC W/AUTO DIFF WBC: CPT

## 2017-10-24 PROCEDURE — 83880 ASSAY OF NATRIURETIC PEPTIDE: CPT

## 2017-10-24 RX ORDER — SPIRONOLACTONE 25 MG/1
50 TABLET ORAL EVERY OTHER DAY
Status: DISCONTINUED | OUTPATIENT
Start: 2017-10-25 | End: 2017-10-25 | Stop reason: HOSPADM

## 2017-10-24 RX ORDER — LEVALBUTEROL TARTRATE 45 UG/1
1 AEROSOL, METERED ORAL EVERY 4 HOURS PRN
Status: DISCONTINUED | OUTPATIENT
Start: 2017-10-24 | End: 2017-10-25 | Stop reason: HOSPADM

## 2017-10-24 RX ORDER — LEVALBUTEROL INHALATION SOLUTION 0.63 MG/3ML
0.63 SOLUTION RESPIRATORY (INHALATION) EVERY 6 HOURS PRN
Status: DISCONTINUED | OUTPATIENT
Start: 2017-10-24 | End: 2017-10-25 | Stop reason: HOSPADM

## 2017-10-24 RX ORDER — PANTOPRAZOLE SODIUM 40 MG/1
40 TABLET, DELAYED RELEASE ORAL
Status: DISCONTINUED | OUTPATIENT
Start: 2017-10-25 | End: 2017-10-25 | Stop reason: HOSPADM

## 2017-10-24 RX ORDER — FAMOTIDINE 20 MG/1
20 TABLET, FILM COATED ORAL DAILY
Status: DISCONTINUED | OUTPATIENT
Start: 2017-10-25 | End: 2017-10-25 | Stop reason: HOSPADM

## 2017-10-24 RX ORDER — ATENOLOL 25 MG/1
12.5 TABLET ORAL NIGHTLY
Status: DISCONTINUED | OUTPATIENT
Start: 2017-10-24 | End: 2017-10-25 | Stop reason: HOSPADM

## 2017-10-24 RX ORDER — NITROGLYCERIN 0.4 MG/1
0.4 TABLET SUBLINGUAL EVERY 5 MIN PRN
Status: DISCONTINUED | OUTPATIENT
Start: 2017-10-24 | End: 2017-10-25 | Stop reason: HOSPADM

## 2017-10-24 RX ORDER — ASPIRIN 81 MG/1
81 TABLET ORAL DAILY
Status: DISCONTINUED | OUTPATIENT
Start: 2017-10-25 | End: 2017-10-25 | Stop reason: HOSPADM

## 2017-10-24 RX ORDER — SODIUM CHLORIDE 0.9 % (FLUSH) 0.9 %
10 SYRINGE (ML) INJECTION PRN
Status: DISCONTINUED | OUTPATIENT
Start: 2017-10-24 | End: 2017-10-25 | Stop reason: HOSPADM

## 2017-10-24 RX ORDER — ONDANSETRON 4 MG/1
4 TABLET, ORALLY DISINTEGRATING ORAL EVERY 8 HOURS PRN
Status: DISCONTINUED | OUTPATIENT
Start: 2017-10-24 | End: 2017-10-25 | Stop reason: HOSPADM

## 2017-10-24 RX ORDER — ACETAMINOPHEN 325 MG/1
650 TABLET ORAL EVERY 4 HOURS PRN
Status: DISCONTINUED | OUTPATIENT
Start: 2017-10-24 | End: 2017-10-25 | Stop reason: HOSPADM

## 2017-10-24 RX ORDER — LEVOTHYROXINE SODIUM 0.03 MG/1
25 TABLET ORAL DAILY
Status: DISCONTINUED | OUTPATIENT
Start: 2017-10-25 | End: 2017-10-25 | Stop reason: HOSPADM

## 2017-10-24 RX ORDER — ASPIRIN 81 MG/1
324 TABLET, CHEWABLE ORAL ONCE
Status: COMPLETED | OUTPATIENT
Start: 2017-10-24 | End: 2017-10-24

## 2017-10-24 RX ORDER — NAPROXEN 500 MG/1
500 TABLET ORAL 2 TIMES DAILY
Status: DISCONTINUED | OUTPATIENT
Start: 2017-10-24 | End: 2017-10-25

## 2017-10-24 RX ORDER — LORAZEPAM 1 MG/1
0.5 TABLET ORAL 2 TIMES DAILY
Status: DISCONTINUED | OUTPATIENT
Start: 2017-10-24 | End: 2017-10-25 | Stop reason: HOSPADM

## 2017-10-24 RX ORDER — MELOXICAM 7.5 MG/1
15 TABLET ORAL DAILY
Status: DISCONTINUED | OUTPATIENT
Start: 2017-10-25 | End: 2017-10-25 | Stop reason: HOSPADM

## 2017-10-24 RX ORDER — SODIUM CHLORIDE 0.9 % (FLUSH) 0.9 %
10 SYRINGE (ML) INJECTION EVERY 12 HOURS SCHEDULED
Status: DISCONTINUED | OUTPATIENT
Start: 2017-10-24 | End: 2017-10-25 | Stop reason: HOSPADM

## 2017-10-24 RX ORDER — ESCITALOPRAM OXALATE 10 MG/1
10 TABLET ORAL NIGHTLY
Status: DISCONTINUED | OUTPATIENT
Start: 2017-10-24 | End: 2017-10-25 | Stop reason: HOSPADM

## 2017-10-24 RX ORDER — SPIRONOLACTONE 25 MG/1
25 TABLET ORAL EVERY OTHER DAY
Status: DISCONTINUED | OUTPATIENT
Start: 2017-10-26 | End: 2017-10-25 | Stop reason: HOSPADM

## 2017-10-24 RX ADMIN — LORAZEPAM 0.5 MG: 1 TABLET ORAL at 23:07

## 2017-10-24 RX ADMIN — ASPIRIN 81 MG 324 MG: 81 TABLET ORAL at 19:01

## 2017-10-24 RX ADMIN — ATENOLOL 12.5 MG: 25 TABLET ORAL at 23:07

## 2017-10-24 RX ADMIN — ESCITALOPRAM 10 MG: 10 TABLET, FILM COATED ORAL at 23:08

## 2017-10-24 RX ADMIN — HYDROMORPHONE HYDROCHLORIDE 1 MG: 1 INJECTION, SOLUTION INTRAMUSCULAR; INTRAVENOUS; SUBCUTANEOUS at 20:15

## 2017-10-24 RX ADMIN — HYDROMORPHONE HYDROCHLORIDE 1 MG: 1 INJECTION, SOLUTION INTRAMUSCULAR; INTRAVENOUS; SUBCUTANEOUS at 19:01

## 2017-10-24 ASSESSMENT — PAIN SCALES - GENERAL
PAINLEVEL_OUTOF10: 5
PAINLEVEL_OUTOF10: 9
PAINLEVEL_OUTOF10: 7
PAINLEVEL_OUTOF10: 6

## 2017-10-24 ASSESSMENT — ENCOUNTER SYMPTOMS
ABDOMINAL PAIN: 0
CONSTIPATION: 0
BACK PAIN: 0
NAUSEA: 0
VOMITING: 0
DIARRHEA: 0
SHORTNESS OF BREATH: 0
COUGH: 0
CHEST TIGHTNESS: 0

## 2017-10-24 ASSESSMENT — PAIN DESCRIPTION - LOCATION
LOCATION: CHEST
LOCATION: CHEST

## 2017-10-24 ASSESSMENT — PAIN DESCRIPTION - DESCRIPTORS: DESCRIPTORS: RADIATING;SHARP

## 2017-10-24 ASSESSMENT — PAIN DESCRIPTION - ORIENTATION
ORIENTATION: LEFT
ORIENTATION: LEFT

## 2017-10-24 ASSESSMENT — PAIN DESCRIPTION - PAIN TYPE: TYPE: ACUTE PAIN

## 2017-10-24 NOTE — ED PROVIDER NOTES
Albuterol; Biaxin [clarithromycin]; Ceclor [cefaclor]; Duricef [cefadroxil]; Fentanyl; Medrol [methylprednisolone]; Morphine; Norco [hydrocodone-acetaminophen]; Penicillins; Percocet [oxycodone-acetaminophen]; and Prednisone    CURRENT MEDICATIONS       Previous Medications    ASPIRIN 81 MG EC TABLET    Take 1 tablet by mouth daily. ATENOLOL (TENORMIN) 25 MG TABLET    Take 12.5 mg by mouth nightly Pt takes at bedtime. ESCITALOPRAM (LEXAPRO) 10 MG TABLET    Take 10 mg by mouth nightly     ESOMEPRAZOLE (NEXIUM) 24.65 MG CPDR CAPSULE    Take 2 capsules by mouth 2 times daily    LEVALBUTEROL (XOPENEX HFA) 45 MCG/ACT INHALER    Inhale 1 puff into the lungs every 4 hours as needed for Wheezing    LEVALBUTEROL (XOPENEX) 0.63 MG/3ML NEBULIZATION    Take 3 mLs by nebulization every 6 hours as needed for Wheezing    LEVOTHYROXINE (SYNTHROID) 25 MCG TABLET    Take 25 mcg by mouth Daily    LORAZEPAM (ATIVAN) 0.5 MG TABLET    Take 0.5 mg by mouth 2 times daily     MELOXICAM (MOBIC) 15 MG TABLET    Take 15 mg by mouth daily    NAPROXEN (NAPROSYN) 500 MG TABLET    Take 500 mg by mouth 2 times daily    NITROGLYCERIN (NITROSTAT) 0.4 MG SL TABLET    Place 0.4 mg under the tongue every 5 minutes as needed for Chest pain. ONDANSETRON (ZOFRAN ODT) 4 MG DISINTEGRATING TABLET    Take 1 tablet by mouth every 8 hours as needed for Nausea    RANITIDINE (ZANTAC) 150 MG TABLET    Take 150 mg by mouth 2 times daily.     SALMETEROL (SEREVENT DISKUS) 50 MCG/DOSE DISKUS INHALER    Inhale 1 puff into the lungs 2 times daily    SPIRONOLACTONE (ALDACTONE) 25 MG TABLET    Take 25 mg by mouth every other day Indications: Patient alternates 25mg and 50mg of spironolactone Patient alternates 25mg and 50mg of spironolactone    SPIRONOLACTONE (ALDACTONE) 50 MG TABLET    Take 50 mg by mouth every other day Indications: Patient alternates 25mg and 50mg of spironolactone Patient alternates 25mg and 50mg of spironolactone       PAST MEDICAL HISTORY Diagnosis Date    Allergic rhinitis     Arthritis     Arthritis     Asthma     CAD (coronary artery disease) 13    cabg x 2    Chlorine inhalation lung injury ([address]     Severe irritability and airway reactivity     Chronic back pain     Dyslipidemia     Dyspnea on exertion     Eczema     GERD (gastroesophageal reflux disease)     Headache     MIGRAINES    HTN (hypertension)     MVP (mitral valve prolapse)     Obesity     Poison ivy     States in lungs    Sleep apnea     COMPLIANT ON CPAP     Thoracic outlet syndrome     bilateral       SURGICAL HISTORY           Procedure Laterality Date    BACK SURGERY      lumbar fusion    CARDIAC CATHETERIZATION      CARPAL TUNNEL RELEASE Right     CHOLECYSTECTOMY      CORONARY ARTERY BYPASS GRAFT  13    cabg x 2     FIRST RIB REMOVAL      bilateral    HYSTERECTOMY      SHOULDER SURGERY Left     TONSILLECTOMY      TOTAL KNEE ARTHROPLASTY Left     ULNAR TUNNEL RELEASE Right     VARICOSE VEIN SURGERY      bilateral         FAMILY HISTORY           Problem Relation Age of Onset    Heart Disease Father     Cancer Father      lung    Heart Disease Mother      5 stents placed    Diabetes Mother     Diabetes Sister     Rheum Arthritis Sister      Family Status   Relation Status    Father     lung cancer    Mother     Sister Alive    Sister Alive    Sister Alive    Sister Alive    Brother  at age less than hour old   [facility] Sister     Sister         SOCIAL HISTORY      reports that she quit smoking about 36 years ago. Her smoking use included Cigarettes. She has a 28.00 pack-year smoking history. She has never used smokeless tobacco. She reports that she does not drink alcohol or use drugs. REVIEW OF SYSTEMS    (2-9 systems for level 4, 10 or more for level 5)     Review of Systems   Constitutional: Negative for activity change, diaphoresis, fatigue and fever.    Respiratory: Negative for cough, chest tightness and shortness of breath. Cardiovascular: Positive for chest pain. Gastrointestinal: Negative for abdominal pain, constipation, diarrhea, nausea and vomiting. Musculoskeletal: Negative for back pain. Chest pain radiates to the left arm   Neurological: Positive for dizziness and light-headedness. Negative for weakness. Except as noted above the remainder of the review of systems was reviewed and negative. PHYSICAL EXAM    (up to 7 for level 4, 8 or more for level 5)     ED Triage Vitals   BP Temp Temp src Pulse Resp SpO2 Height Weight   -- -- -- -- -- -- -- --       Physical Exam   Constitutional: She is oriented to person, place, and time. She appears well-developed and well-nourished. She does not appear ill. No distress. HENT:   Head: Normocephalic and atraumatic. Mouth/Throat: Oropharynx is clear and moist.   Eyes: Conjunctivae are normal.   Cardiovascular: Normal rate and regular rhythm. Pulmonary/Chest: Breath sounds normal. No respiratory distress. She exhibits no tenderness. Abdominal: Soft. There is no tenderness. Musculoskeletal: Normal range of motion. She exhibits no edema. Neurological: She is alert and oriented to person, place, and time. Skin: Skin is warm and dry. DIAGNOSTIC RESULTS     EKG: All EKG's are interpreted by the Emergency Department Physician who either signs or Co-signs this chart in the absence of a cardiologist.    EKG was interpreted by Dr. Sena Peralta:   Non-plain film images such as CT, Ultrasound and MRI are read by the radiologist. [facility] radiographic images are visualized and preliminarily interpreted by the emergency physician with the below findings:      Interpretation per the Radiologist below, if available at the time of this note:    XR Chest Portable   Final Result   Stable chest without acute process.                LABS:  Labs Reviewed   CBC WITH AUTO DIFFERENTIAL - Abnormal; Notable for the following:

## 2017-10-25 VITALS
HEIGHT: 66 IN | SYSTOLIC BLOOD PRESSURE: 110 MMHG | BODY MASS INDEX: 47.09 KG/M2 | RESPIRATION RATE: 24 BRPM | WEIGHT: 293 LBS | DIASTOLIC BLOOD PRESSURE: 58 MMHG | TEMPERATURE: 97.3 F | HEART RATE: 66 BPM | OXYGEN SATURATION: 95 %

## 2017-10-25 LAB
ABSOLUTE EOS #: 0.2 K/UL (ref 0–0.4)
ABSOLUTE IMMATURE GRANULOCYTE: NORMAL K/UL (ref 0–0.3)
ABSOLUTE LYMPH #: 2.1 K/UL (ref 1–4.8)
ABSOLUTE MONO #: 0.5 K/UL (ref 0.2–0.8)
ANION GAP SERPL CALCULATED.3IONS-SCNC: 10 MMOL/L (ref 9–17)
BASOPHILS # BLD: 0 %
BASOPHILS ABSOLUTE: 0 K/UL (ref 0–0.2)
BUN BLDV-MCNC: 24 MG/DL (ref 8–23)
BUN/CREAT BLD: 28 (ref 9–20)
CALCIUM SERPL-MCNC: 8.6 MG/DL (ref 8.6–10.4)
CHLORIDE BLD-SCNC: 100 MMOL/L (ref 98–107)
CO2: 24 MMOL/L (ref 20–31)
CREAT SERPL-MCNC: 0.85 MG/DL (ref 0.5–0.9)
DIFFERENTIAL TYPE: NORMAL
EKG ATRIAL RATE: 60 BPM
EKG ATRIAL RATE: 61 BPM
EKG P AXIS: 27 DEGREES
EKG P AXIS: 72 DEGREES
EKG P-R INTERVAL: 124 MS
EKG P-R INTERVAL: 144 MS
EKG Q-T INTERVAL: 432 MS
EKG Q-T INTERVAL: 434 MS
EKG QRS DURATION: 84 MS
EKG QRS DURATION: 90 MS
EKG QTC CALCULATION (BAZETT): 434 MS
EKG QTC CALCULATION (BAZETT): 434 MS
EKG R AXIS: 23 DEGREES
EKG R AXIS: 35 DEGREES
EKG T AXIS: 34 DEGREES
EKG T AXIS: 34 DEGREES
EKG VENTRICULAR RATE: 60 BPM
EKG VENTRICULAR RATE: 61 BPM
EOSINOPHILS RELATIVE PERCENT: 4 %
ESTIMATED AVERAGE GLUCOSE: 163 MG/DL
GFR AFRICAN AMERICAN: >60 ML/MIN
GFR NON-AFRICAN AMERICAN: >60 ML/MIN
GFR SERPL CREATININE-BSD FRML MDRD: ABNORMAL ML/MIN/{1.73_M2}
GFR SERPL CREATININE-BSD FRML MDRD: ABNORMAL ML/MIN/{1.73_M2}
GLUCOSE BLD-MCNC: 180 MG/DL (ref 70–99)
HBA1C MFR BLD: 7.3 % (ref 4–6)
HCT VFR BLD CALC: 39 % (ref 36–46)
HEMOGLOBIN: 13.4 G/DL (ref 12–16)
IMMATURE GRANULOCYTES: NORMAL %
LYMPHOCYTES # BLD: 35 %
MCH RBC QN AUTO: 29.8 PG (ref 26–34)
MCHC RBC AUTO-ENTMCNC: 34.3 G/DL (ref 31–37)
MCV RBC AUTO: 86.9 FL (ref 80–100)
MONOCYTES # BLD: 8 %
MYOGLOBIN: 46 NG/ML (ref 25–58)
PDW BLD-RTO: 13.9 % (ref 11.5–14.5)
PLATELET # BLD: 142 K/UL (ref 130–400)
PLATELET ESTIMATE: NORMAL
PMV BLD AUTO: NORMAL FL (ref 6–12)
POTASSIUM SERPL-SCNC: 3.8 MMOL/L (ref 3.7–5.3)
RBC # BLD: 4.48 M/UL (ref 4–5.2)
RBC # BLD: NORMAL 10*6/UL
SEG NEUTROPHILS: 53 %
SEGMENTED NEUTROPHILS ABSOLUTE COUNT: 3.2 K/UL (ref 1.8–7.7)
SODIUM BLD-SCNC: 134 MMOL/L (ref 135–144)
TROPONIN INTERP: NORMAL
TROPONIN T: <0.03 NG/ML
WBC # BLD: 6 K/UL (ref 3.5–11)
WBC # BLD: NORMAL 10*3/UL

## 2017-10-25 PROCEDURE — 83874 ASSAY OF MYOGLOBIN: CPT

## 2017-10-25 PROCEDURE — 6370000000 HC RX 637 (ALT 250 FOR IP): Performed by: INTERNAL MEDICINE

## 2017-10-25 PROCEDURE — 84484 ASSAY OF TROPONIN QUANT: CPT

## 2017-10-25 PROCEDURE — 93005 ELECTROCARDIOGRAM TRACING: CPT

## 2017-10-25 PROCEDURE — 80048 BASIC METABOLIC PNL TOTAL CA: CPT

## 2017-10-25 PROCEDURE — 6360000002 HC RX W HCPCS: Performed by: FAMILY MEDICINE

## 2017-10-25 PROCEDURE — 6370000000 HC RX 637 (ALT 250 FOR IP): Performed by: FAMILY MEDICINE

## 2017-10-25 PROCEDURE — 85025 COMPLETE CBC W/AUTO DIFF WBC: CPT

## 2017-10-25 PROCEDURE — 6360000002 HC RX W HCPCS: Performed by: NURSE PRACTITIONER

## 2017-10-25 PROCEDURE — 36415 COLL VENOUS BLD VENIPUNCTURE: CPT

## 2017-10-25 PROCEDURE — 2580000003 HC RX 258: Performed by: FAMILY MEDICINE

## 2017-10-25 RX ORDER — KETOROLAC TROMETHAMINE 30 MG/ML
30 INJECTION, SOLUTION INTRAMUSCULAR; INTRAVENOUS ONCE
Status: COMPLETED | OUTPATIENT
Start: 2017-10-25 | End: 2017-10-25

## 2017-10-25 RX ADMIN — MELOXICAM 15 MG: 7.5 TABLET ORAL at 09:33

## 2017-10-25 RX ADMIN — KETOROLAC TROMETHAMINE 30 MG: 30 INJECTION, SOLUTION INTRAMUSCULAR at 10:53

## 2017-10-25 RX ADMIN — Medication 10 ML: at 09:33

## 2017-10-25 RX ADMIN — Medication 30 ML: at 17:02

## 2017-10-25 RX ADMIN — SPIRONOLACTONE 50 MG: 25 TABLET, FILM COATED ORAL at 09:33

## 2017-10-25 RX ADMIN — ASPIRIN 81 MG: 81 TABLET, COATED ORAL at 09:33

## 2017-10-25 RX ADMIN — ENOXAPARIN SODIUM 40 MG: 40 INJECTION SUBCUTANEOUS at 09:34

## 2017-10-25 RX ADMIN — LEVOTHYROXINE SODIUM 25 MCG: 25 TABLET ORAL at 09:33

## 2017-10-25 RX ADMIN — LORAZEPAM 0.5 MG: 1 TABLET ORAL at 09:34

## 2017-10-25 RX ADMIN — PANTOPRAZOLE SODIUM 40 MG: 40 TABLET, DELAYED RELEASE ORAL at 09:33

## 2017-10-25 ASSESSMENT — PAIN DESCRIPTION - PAIN TYPE: TYPE: ACUTE PAIN

## 2017-10-25 ASSESSMENT — PAIN DESCRIPTION - PROGRESSION
CLINICAL_PROGRESSION: NOT CHANGED

## 2017-10-25 ASSESSMENT — PAIN SCALES - GENERAL
PAINLEVEL_OUTOF10: 5
PAINLEVEL_OUTOF10: 4
PAINLEVEL_OUTOF10: 4

## 2017-10-25 ASSESSMENT — PAIN DESCRIPTION - LOCATION: LOCATION: CHEST

## 2017-10-25 ASSESSMENT — PAIN DESCRIPTION - ORIENTATION: ORIENTATION: LEFT

## 2017-10-25 NOTE — PROGRESS NOTES
Physical Therapy  DATE: 10/25/2017    NAME: Sharmin Thomas  MRN: [id number]   : 1955    Patient not seen this date for Physical Therapy due to:  [] Blood transfusion in progress  [x] Cancel by RN: Inez Villafana, RN reports patient is indep in all ambulation and does need therapy. Reports they are just waiting for doctor to come in to discharge. [] Hemodialysis  []  Refusal by Patient   [] Spine Precautions   [] Strict Bedrest  [] Surgery  [] Testing      [] Other        [x] PT being discontinued at this time. Patient independent. No further needs. [] PT being discontinued at this time as the patient has been transferred to hospice care. No further needs.     Viral De, PT

## 2017-10-25 NOTE — H&P
Family Medicine History and Physical      Name: Zo Sandoval  MRN: [id number]     [place]: [de-identified]  Room: [place] [id number]    Admit Date: 10/24/2017  PCP: Josefina Fonseca MD    Chief Complaint:     Chief Complaint   Patient presents with    Chest Pain       History Obtained From:     patient    History of Present Illness:      Zo Sandoval is a  65 y.o.  female who presents with Chest Pain  this unfortunate lady is admitted via the er with chest pain     she has a rich pmh of cad with open heart in the past for cad    she is admitted for chest pain that began today and did not resolve with ntg    denied diaphoresis dyspnea syncope etc   seh is admitted post er eval    Past Medical History:     Past Medical History:   Diagnosis Date    Allergic rhinitis     Arthritis     Arthritis     Asthma     CAD (coronary artery disease) 8/6/13    cabg x 2    Chlorine inhalation lung injury ([address]     Severe irritability and airway reactivity     Chronic back pain     Dyslipidemia     Dyspnea on exertion     Eczema     GERD (gastroesophageal reflux disease)     Headache     MIGRAINES    HTN (hypertension)     MVP (mitral valve prolapse)     Obesity     Poison ivy     States in lungs    Sleep apnea     COMPLIANT ON CPAP     Thoracic outlet syndrome     bilateral        Past Surgical History:     Past Surgical History:   Procedure Laterality Date    BACK SURGERY      lumbar fusion    CARDIAC CATHETERIZATION      CARPAL TUNNEL RELEASE Right     CHOLECYSTECTOMY      CORONARY ARTERY BYPASS GRAFT  8/6/13    cabg x 2     FIRST RIB REMOVAL      bilateral    HYSTERECTOMY      SHOULDER SURGERY Left     TONSILLECTOMY      TOTAL KNEE ARTHROPLASTY Left     ULNAR TUNNEL RELEASE Right     VARICOSE VEIN SURGERY      bilateral        Medications Prior to Admission:       Prior to Admission medications    Medication Sig Start Date End Date Taking?  Authorizing Provider   naproxen (NAPROSYN) 500 MG tablet Take 500 mg by mouth 2 times daily   Yes Historical Provider, MD   esomeprazole (NEXIUM) 24.65 MG CPDR capsule Take 2 capsules by mouth 2 times daily   Yes Historical Provider, MD   spironolactone (ALDACTONE) 50 MG tablet Take 50 mg by mouth every other day Indications: Patient alternates 25mg and 50mg of spironolactone Patient alternates 25mg and 50mg of spironolactone   Yes Historical Provider, MD   levothyroxine (SYNTHROID) 25 MCG tablet Take 25 mcg by mouth Daily   Yes Historical Provider, MD   meloxicam (MOBIC) 15 MG tablet Take 15 mg by mouth daily   Yes Historical Provider, MD   ondansetron (ZOFRAN ODT) 4 MG disintegrating tablet Take 1 tablet by mouth every 8 hours as needed for Nausea 5/2/17  Yes Linda Colunga MD   salmeterol (SEREVENT DISKUS) 50 MCG/DOSE diskus inhaler Inhale 1 puff into the lungs 2 times daily 3/14/17  Yes Gracie Ron MD   levalbuterol (XOPENEX) 0.63 MG/3ML nebulization Take 3 mLs by nebulization every 6 hours as needed for Wheezing 3/1/17  Yes Gracie Ron MD   levalbuterol (XOPENEX HFA) 45 MCG/ACT inhaler Inhale 1 puff into the lungs every 4 hours as needed for Wheezing 12/20/16 12/20/17 Yes Gracie Ron MD   escitalopram (LEXAPRO) 10 MG tablet Take 10 mg by mouth nightly    Yes Historical Provider, MD   nitroGLYCERIN (NITROSTAT) 0.4 MG SL tablet Place 0.4 mg under the tongue every 5 minutes as needed for Chest pain. Yes Historical Provider, MD   LORazepam (ATIVAN) 0.5 MG tablet Take 0.5 mg by mouth 2 times daily    Yes Historical Provider, MD   aspirin 81 MG EC tablet Take 1 tablet by mouth daily. 8/8/13  Yes Magi Jones MD   spironolactone (ALDACTONE) 25 MG tablet Take 25 mg by mouth every other day Indications: Patient alternates 25mg and 50mg of spironolactone Patient alternates 25mg and 50mg of spironolactone   Yes Historical Provider, MD   atenolol (TENORMIN) 25 MG tablet Take 12.5 mg by mouth nightly Pt takes at bedtime.    Yes Historical Provider, MD   ranitidine

## 2017-10-25 NOTE — CONSULTS
Reason for Consult: [place]    HPI: Ms. Aleta Olmos is a 66 yo patient known to our practice for CAD, HTN, and HC who developed left lateral chest pain yesterday afternoon while she was sitting in her chair. She denies associating dyspnea, nausea, or diaphoresis. She took a NTG without relief. The pain has been constant and is worse with deep breaths. She has had some relief with Dilaudid. She had an episode of chest pain three days ago that was attributed to stress as she had been arguing with her . She denies orthopnea, PND, dizziness, lightheadedness, palpitations, or leg edema. PMH:   Past Medical History:   Diagnosis Date    Allergic rhinitis     Arthritis     Arthritis     Asthma     CAD (coronary artery disease) 8/6/13    cabg x 2    Chlorine inhalation lung injury ([address]     Severe irritability and airway reactivity     Chronic back pain     Dyslipidemia     Dyspnea on exertion     Eczema     GERD (gastroesophageal reflux disease)     Headache     MIGRAINES    HTN (hypertension)     MVP (mitral valve prolapse)     Obesity     Poison ivy     States in lungs    Sleep apnea     COMPLIANT ON CPAP     Thoracic outlet syndrome     bilateral    Thyroid disease      Coronary artery disease, status post off pump CABG x2 done 8/6/13 with KENNY to the LAD and LIMA to the OM2. Cardiac catheterization performed 6/19/15 revealed mild, non-obstructive disease. The LMCA had 30% ostial stenosis, LAD had luminal irregularities no more than 10-20%, Circ had luminal irregularities 20% in the proximal portion and 30-40% in the distal portion just prior to the large PL branch, AV groove branch has luminal irregularities no more than 20%, RCA had luminal irregularities no more than 20%, LIMA to the OM and LIMA to the LAD were not investigated as they were occluded in January of this year    Stress test 7/13/2017 was normal    Echo (7/12/17): Left ventricle is normal in size  Global left ventricular systolic MD   ranitidine (ZANTAC) 150 MG tablet Take 150 mg by mouth 2 times daily. Yes Historical Provider, MD        ROS: Negative fever/chills. Negative cough. Negative Abdominal pain. Negative NVD. Negative dysuria    Exam: VS: BP (!) 146/64   Pulse 60   Temp 97.7 °F (36.5 °C) (Oral)   Resp 20   Ht 5' 6\" (1.676 m)   Wt (!) 314 lb (142.4 kg)   SpO2 96%   BMI 50.68 kg/m²      Wt. Weight change:     I/O: No intake/output data recorded. Monitor: SR    General Appearance: AOX3, NAD  Skin:pink, warm, dry  Pulmonary/Chest:CTA  Cardiovascular: S1S2, RRR, negative murmur, negative gallop, no visible JVD, negative carotid bruit bilaterally  Abdomen: BS present, soft, nontender  Extremities:Mild left peripheral edema    Labs:   CBC with Differential:    Lab Results   Component Value Date    WBC 6.0 10/25/2017    RBC 4.48 10/25/2017    RBC 4.74 04/23/2012    HGB 13.4 10/25/2017    HCT 39.0 10/25/2017     10/25/2017     04/23/2012    MCV 86.9 10/25/2017    MCH 29.8 10/25/2017    MCHC 34.3 10/25/2017    RDW 13.9 10/25/2017    LYMPHOPCT 35 10/25/2017    MONOPCT 8 10/25/2017    BASOPCT 0 10/25/2017    MONOSABS 0.50 10/25/2017    LYMPHSABS 2.10 10/25/2017    EOSABS 0.20 10/25/2017    BASOSABS 0.00 10/25/2017    DIFFTYPE NOT REPORTED 10/25/2017       BMP:    Lab Results   Component Value Date     10/25/2017    K 3.8 10/25/2017     10/25/2017    CO2 24 10/25/2017    BUN 24 10/25/2017    LABALBU 4.1 07/25/2017    CREATININE 0.85 10/25/2017    CALCIUM 8.6 10/25/2017    GFRAA >60 10/25/2017    LABGLOM >60 10/25/2017    GLUCOSE 180 10/25/2017    GLUCOSE 145 04/23/2012       Troponin <0.03  <0.03    CXR: Stable chest without acute process    EKG: SR. No ischemic changes      A/P: 1. CAD  -Atypical chest pain. Troponin and EKG negative. Recent stress test and Echo normal. Will review further w/u  -On ASA and B. Blocker. Not on Statin d/t myalgia     2. HTN  -BP controlled    3.  HC  -Taking Metamucil    Will discuss with Dr. Fito Patel    Discussed with Dr. Fito Patel. Atypical CP.  Will try Toradol        Electronically signed by Raj Fowler CNP on 10/25/2017 at 9:07 AM

## 2017-10-25 NOTE — ED NOTES
Pt to restroom with steady gait     [address] [place], [address] [facility]  10/24/17 9153

## 2017-10-25 NOTE — PROGRESS NOTES
Progress Note    10/25/2017   3:20 PM    Name:  Micha Roy  MRN:    [id number]     Acct:     [de-identified]   Room:  [address] [place] [address] Day:   Progress Note    1     Admit Date: 10/24/2017  6:37 PM  PCP: Rupert Marx MD    Subjective:     C/C:   Chief Complaint   Patient presents with    Chest Pain       Interval History: Status: not changed. Pt examined chart reviewed as above   no new complaints  Chest pain persists   work up underway    agree with consultants  Ros  General no weight loss or fever  ent no trouble hearing tasting talking or swallowing  Cardiac [place]  Respiratory no cough or dyspnea  Gi no complaints nausea vomiting stools  gu no difficulties urgency hesitancy or dysuria  Ortho no complaints of synovitis or decreased range of motion  Neuro no complaints of gait station or speech  Psych no complaints or nerves or memory  Vascular no claudication or stroke  Endo no  Complaints of dm or thyroid  Heme no complaints of anemia or blood dyscrasias      Medications: Allergies: Allergies   Allergen Reactions    Albuterol Other (See Comments)     Burning in chest    Biaxin [Clarithromycin]     Ceclor [Cefaclor]     Duricef [Cefadroxil]     Fentanyl     Medrol [Methylprednisolone]     Morphine     Norco [Hydrocodone-Acetaminophen] Hives    Penicillins     Percocet [Oxycodone-Acetaminophen]     Prednisone Swelling     IV and oral. Makes tongue swell.         Current Meds:   aspirin EC tablet 81 mg Daily   atenolol (TENORMIN) tablet 12.5 mg Nightly   [START ON 10/26/2017] spironolactone (ALDACTONE) tablet 25 mg Every Other Day   famotidine (PEPCID) tablet 20 mg Daily   LORazepam (ATIVAN) tablet 0.5 mg BID   nitroGLYCERIN (NITROSTAT) SL tablet 0.4 mg Q5 Min PRN   escitalopram (LEXAPRO) tablet 10 mg Nightly   levalbuterol (XOPENEX HFA) inhaler 1 puff Q4H PRN   levalbuterol (XOPENEX) nebulization 0.63 mg Q6H PRN   olodaterol (STRIVERDI RESPIMAT) inhaler 2 puff Daily   ondansetron (ZOFRAN-ODT) disintegrating tablet 4 mg Q8H PRN   levothyroxine (SYNTHROID) tablet 25 mcg Daily   meloxicam (MOBIC) tablet 15 mg Daily   pantoprazole (PROTONIX) tablet 40 mg QAM AC   spironolactone (ALDACTONE) tablet 50 mg Every Other Day   sodium chloride flush 0.9 % injection 10 mL 2 times per day   sodium chloride flush 0.9 % injection 10 mL PRN   acetaminophen (TYLENOL) tablet 650 mg Q4H PRN   magnesium hydroxide (MILK OF MAGNESIA) 400 MG/5ML suspension 30 mL Daily PRN   enoxaparin (LOVENOX) injection 40 mg Daily       Data:     Code Status:  Full Code    Family History   Problem Relation Age of Onset    Heart Disease Father     Cancer Father      lung    Heart Disease Mother      9 stents placed    Diabetes Mother     Diabetes Sister     Rheum Arthritis Sister        Social History     Social History    Marital status:      Spouse name: N/A    Number of children: N/A    Years of education: N/A     Occupational History    unemployed      Social History Main Topics    Smoking status: Former Smoker     Packs/day: 2.00     Years: 14.00     Types: Cigarettes     Quit date: 4/15/1981    Smokeless tobacco: Never Used    Alcohol use No    Drug use: No    Sexual activity: Not on file     Other Topics Concern    Not on file     Social History Narrative    No narrative on file       Vitals:  BP (!) 110/58   Pulse 66   Temp 97.3 °F (36.3 °C) (Oral)   Resp 24   Ht 5' 6\" (1.676 m)   Wt (!) 314 lb (142.4 kg)   SpO2 95%   BMI 50.68 kg/m²   Temp (24hrs), Av.6 °F (36.4 °C), Min:97.3 °F (36.3 °C), Max:97.7 °F (36.5 °C)    No results for input(s): POCGLU in the last 72 hours. I/O (24Hr):   No intake or output data in the 24 hours ending 10/25/17 1520    Labs:  Daily Labs for 3 Days:    Hematology:  Recent Labs      10/24/17   1904  10/25/17   0616   WBC  6.5  6.0   HGB  14.1  13.4   HCT  41.6  39.0   PLT  152  142     Chemistry:  Recent Labs      10/24/17   1904  10/25/17   0616   NA  136  134*   K  4.1 3.8   CL  101  100   CO2  23  24   GLUCOSE  146*  180*   BUN  21  24*   CREATININE  1.08*  0.85   CALCIUM  9.1  8.6     Recent Labs      10/24/17   1904   LABA1C  7.3*   AMYLASE  34   LIPASE  19       paraclinics reviewed as posted  Physical Examination:      General appearance: alert, cooperative and no distress  Mental Status: oriented to person, place and time and normal affect  Lungs: clear to auscultation bilaterally, normal effort  Heart: regular rate and rhythm, no murmur,  Abdomen: soft, nontender, nondistended, bowel sounds present all four quadrants, no masses, hepatomegaly or splenomegaly  Extremities: no edema, redness or tenderness in the calves  Skin: no gross lesions, rashes, or induration  ent perrla with eomi conjunctivae pink sclerae clear  Neuro-oriented and alert cn2-12 intact cerebral and cerebellar intact reflexes +2/4 bilaterally  Vascular  Good dp tp carotids  Ortho- no masses or synovitis  Good rom  Lymphatics none palpated in cervical supraclavicular axillary  Or inguinal area    Assessment:        Primary Problem  <principal problem not specified>   chest pain   Morbid obesity   asthma  There are no active hospital problems to display for this patient. Past Medical History:   Diagnosis Date    Allergic rhinitis     Arthritis     Arthritis     Asthma     CAD (coronary artery disease) 8/6/13    cabg x 2    Chlorine inhalation lung injury ([address]     Severe irritability and airway reactivity     Chronic back pain     Dyslipidemia     Dyspnea on exertion     Eczema     GERD (gastroesophageal reflux disease)     Headache     MIGRAINES    HTN (hypertension)     MVP (mitral valve prolapse)     Obesity     Poison ivy     States in lungs    Sleep apnea     COMPLIANT ON CPAP     Thoracic outlet syndrome     bilateral    Thyroid disease         Plan:        1.  Cont same planof care  2.  cardiac work up pending      Electronically signed by Sadi Henriquez MD on 10/25/2017 at 3:20

## 2017-10-25 NOTE — ED NOTES
RT in room to help with home CPAP     [address] [place]ames[place], [address] [facility]  10/25/17 8366

## 2017-11-14 ENCOUNTER — HOSPITAL ENCOUNTER (OUTPATIENT)
Dept: MRI IMAGING | Age: 62
Discharge: HOME | End: 2017-11-14
Payer: COMMERCIAL

## 2017-11-14 DIAGNOSIS — M48.02 CERVICAL SPINAL STENOSIS: ICD-10-CM

## 2017-11-14 DIAGNOSIS — M48.04 SPINAL STENOSIS OF THORACIC REGION: ICD-10-CM

## 2017-11-14 PROCEDURE — 72146 MRI CHEST SPINE W/O DYE: CPT

## 2017-11-14 PROCEDURE — 72141 MRI NECK SPINE W/O DYE: CPT

## 2017-12-03 ENCOUNTER — HOSPITAL ENCOUNTER (EMERGENCY)
Age: 62
Discharge: HOME | End: 2017-12-03
Attending: EMERGENCY MEDICINE
Payer: COMMERCIAL

## 2017-12-03 VITALS
DIASTOLIC BLOOD PRESSURE: 60 MMHG | SYSTOLIC BLOOD PRESSURE: 129 MMHG | TEMPERATURE: 98.2 F | WEIGHT: 293 LBS | HEART RATE: 76 BPM | OXYGEN SATURATION: 96 % | RESPIRATION RATE: 18 BRPM | HEIGHT: 66 IN | BODY MASS INDEX: 47.09 KG/M2

## 2017-12-03 DIAGNOSIS — R10.9 ABDOMINAL PAIN, UNSPECIFIED ABDOMINAL LOCATION: ICD-10-CM

## 2017-12-03 DIAGNOSIS — R11.0 NAUSEA: ICD-10-CM

## 2017-12-03 DIAGNOSIS — R19.7 DIARRHEA, UNSPECIFIED TYPE: Primary | ICD-10-CM

## 2017-12-03 LAB
ABSOLUTE EOS #: 0.2 K/UL (ref 0–0.4)
ABSOLUTE IMMATURE GRANULOCYTE: ABNORMAL K/UL (ref 0–0.3)
ABSOLUTE LYMPH #: 1.7 K/UL (ref 1–4.8)
ABSOLUTE MONO #: 0.6 K/UL (ref 0.2–0.8)
ALBUMIN SERPL-MCNC: 3.9 G/DL (ref 3.5–5.2)
ALBUMIN/GLOBULIN RATIO: ABNORMAL (ref 1–2.5)
ALP BLD-CCNC: 56 U/L (ref 35–104)
ALT SERPL-CCNC: 88 U/L (ref 5–33)
AMYLASE: 26 U/L (ref 28–100)
ANION GAP SERPL CALCULATED.3IONS-SCNC: 13 MMOL/L (ref 9–17)
AST SERPL-CCNC: 66 U/L
BASOPHILS # BLD: 0 % (ref 0–2)
BASOPHILS ABSOLUTE: 0 K/UL (ref 0–0.2)
BILIRUB SERPL-MCNC: 0.55 MG/DL (ref 0.3–1.2)
BILIRUBIN DIRECT: 0.14 MG/DL
BILIRUBIN URINE: NEGATIVE
BILIRUBIN, INDIRECT: 0.41 MG/DL (ref 0–1)
BUN BLDV-MCNC: 12 MG/DL (ref 8–23)
BUN/CREAT BLD: 15 (ref 9–20)
CALCIUM SERPL-MCNC: 8.9 MG/DL (ref 8.6–10.4)
CHLORIDE BLD-SCNC: 104 MMOL/L (ref 98–107)
CO2: 22 MMOL/L (ref 20–31)
COLOR: YELLOW
COMMENT UA: NORMAL
CREAT SERPL-MCNC: 0.78 MG/DL (ref 0.5–0.9)
DIFFERENTIAL TYPE: ABNORMAL
EOSINOPHILS RELATIVE PERCENT: 4 % (ref 1–4)
GFR AFRICAN AMERICAN: >60 ML/MIN
GFR NON-AFRICAN AMERICAN: >60 ML/MIN
GFR SERPL CREATININE-BSD FRML MDRD: ABNORMAL ML/MIN/{1.73_M2}
GFR SERPL CREATININE-BSD FRML MDRD: ABNORMAL ML/MIN/{1.73_M2}
GLOBULIN: ABNORMAL G/DL (ref 1.5–3.8)
GLUCOSE BLD-MCNC: 144 MG/DL (ref 70–99)
GLUCOSE URINE: NEGATIVE
HCT VFR BLD CALC: 43.5 % (ref 36–46)
HEMOGLOBIN: 14.6 G/DL (ref 12–16)
IMMATURE GRANULOCYTES: ABNORMAL %
KETONES, URINE: NEGATIVE
LEUKOCYTE ESTERASE, URINE: NEGATIVE
LIPASE: 11 U/L (ref 13–60)
LYMPHOCYTES # BLD: 26 % (ref 24–44)
MCH RBC QN AUTO: 29.4 PG (ref 26–34)
MCHC RBC AUTO-ENTMCNC: 33.6 G/DL (ref 31–37)
MCV RBC AUTO: 87.5 FL (ref 80–100)
MONOCYTES # BLD: 9 % (ref 1–7)
NITRITE, URINE: NEGATIVE
PDW BLD-RTO: 15.3 % (ref 11.5–14.5)
PH UA: 5 (ref 5–8)
PLATELET # BLD: 156 K/UL (ref 130–400)
PLATELET ESTIMATE: ABNORMAL
PMV BLD AUTO: 9.6 FL (ref 6–12)
POTASSIUM SERPL-SCNC: 4 MMOL/L (ref 3.7–5.3)
PROTEIN UA: NEGATIVE
RBC # BLD: 4.97 M/UL (ref 4–5.2)
RBC # BLD: ABNORMAL 10*6/UL
SEG NEUTROPHILS: 61 % (ref 36–66)
SEGMENTED NEUTROPHILS ABSOLUTE COUNT: 4 K/UL (ref 1.8–7.7)
SODIUM BLD-SCNC: 139 MMOL/L (ref 135–144)
SPECIFIC GRAVITY UA: 1.02 (ref 1–1.03)
TOTAL PROTEIN: 6.8 G/DL (ref 6.4–8.3)
TURBIDITY: CLEAR
URINE HGB: NEGATIVE
UROBILINOGEN, URINE: NORMAL
WBC # BLD: 6.6 K/UL (ref 3.5–11)
WBC # BLD: ABNORMAL 10*3/UL

## 2017-12-03 PROCEDURE — 80048 BASIC METABOLIC PNL TOTAL CA: CPT

## 2017-12-03 PROCEDURE — 6360000002 HC RX W HCPCS: Performed by: NURSE PRACTITIONER

## 2017-12-03 PROCEDURE — 83690 ASSAY OF LIPASE: CPT

## 2017-12-03 PROCEDURE — 2580000003 HC RX 258: Performed by: NURSE PRACTITIONER

## 2017-12-03 PROCEDURE — 99284 EMERGENCY DEPT VISIT MOD MDM: CPT

## 2017-12-03 PROCEDURE — 96374 THER/PROPH/DIAG INJ IV PUSH: CPT

## 2017-12-03 PROCEDURE — 80076 HEPATIC FUNCTION PANEL: CPT

## 2017-12-03 PROCEDURE — 96361 HYDRATE IV INFUSION ADD-ON: CPT

## 2017-12-03 PROCEDURE — 85025 COMPLETE CBC W/AUTO DIFF WBC: CPT

## 2017-12-03 PROCEDURE — 81003 URINALYSIS AUTO W/O SCOPE: CPT

## 2017-12-03 PROCEDURE — 82150 ASSAY OF AMYLASE: CPT

## 2017-12-03 RX ORDER — ONDANSETRON 4 MG/1
4 TABLET, ORALLY DISINTEGRATING ORAL EVERY 8 HOURS PRN
Qty: 15 TABLET | Refills: 0 | Status: SHIPPED | OUTPATIENT
Start: 2017-12-03 | End: 2018-01-03 | Stop reason: ALTCHOICE

## 2017-12-03 RX ORDER — 0.9 % SODIUM CHLORIDE 0.9 %
1000 INTRAVENOUS SOLUTION INTRAVENOUS ONCE
Status: COMPLETED | OUTPATIENT
Start: 2017-12-03 | End: 2017-12-03

## 2017-12-03 RX ORDER — ONDANSETRON 2 MG/ML
4 INJECTION INTRAMUSCULAR; INTRAVENOUS ONCE
Status: COMPLETED | OUTPATIENT
Start: 2017-12-03 | End: 2017-12-03

## 2017-12-03 RX ORDER — DICYCLOMINE HCL 20 MG
20 TABLET ORAL 3 TIMES DAILY PRN
Qty: 20 TABLET | Refills: 3 | Status: SHIPPED | OUTPATIENT
Start: 2017-12-03 | End: 2018-01-03 | Stop reason: ALTCHOICE

## 2017-12-03 RX ADMIN — ONDANSETRON 4 MG: 2 INJECTION INTRAMUSCULAR; INTRAVENOUS at 17:24

## 2017-12-03 RX ADMIN — SODIUM CHLORIDE 1000 ML: 9 INJECTION, SOLUTION INTRAVENOUS at 17:25

## 2017-12-03 ASSESSMENT — PAIN SCALES - GENERAL: PAINLEVEL_OUTOF10: 6

## 2017-12-03 ASSESSMENT — ENCOUNTER SYMPTOMS
BLOOD IN STOOL: 0
RECTAL PAIN: 0
ABDOMINAL PAIN: 1
ABDOMINAL DISTENTION: 1
NAUSEA: 1
BACK PAIN: 0

## 2017-12-03 ASSESSMENT — PAIN DESCRIPTION - ONSET: ONSET: ON-GOING

## 2017-12-03 ASSESSMENT — PAIN DESCRIPTION - FREQUENCY: FREQUENCY: CONTINUOUS

## 2017-12-03 ASSESSMENT — PAIN DESCRIPTION - PROGRESSION: CLINICAL_PROGRESSION: GRADUALLY WORSENING

## 2017-12-03 ASSESSMENT — PAIN DESCRIPTION - PAIN TYPE: TYPE: ACUTE PAIN

## 2017-12-03 ASSESSMENT — PAIN DESCRIPTION - LOCATION: LOCATION: ABDOMEN;GENERALIZED

## 2017-12-03 ASSESSMENT — PAIN DESCRIPTION - DESCRIPTORS: DESCRIPTORS: ACHING

## 2017-12-03 NOTE — ED NOTES
Pt ambulatory to room 27 with c/o ongoing diarrhea x 1 week, and generalized abd pain onset approximately 2 days ago. Pt reports much belching and flatus. Pt states \"I was around a little girl about 10 days ago who had C-Diff\". Pt denies any CP, SOB, vomiting, visible blood in stool, or urinary symptoms. NAD noted.       Sue Galeano RN  12/03/17 0813

## 2017-12-03 NOTE — ED PROVIDER NOTES
[address] [place] [address] ED  eMERGENCY dEPARTMENT eNCOUnter      Pt Name: Miguel Dacosta  MRN: [id number]  [place] 1955  Date of evaluation: 12/3/2017  Provider: Jenifer Kohler NP    [address] [place] [address]       Chief Complaint   Patient presents with    Abdominal Pain    Nausea    Diarrhea         HISTORY OF PRESENT ILLNESS  (Location/Symptom, Timing/Onset, Context/Setting, Quality, Duration, Modifying Factors, Severity.)   Miguel Dacosta is a 65 y.o. female who presents to the emergency department By private auto for evaluation of abdominal pain nausea and diarrhea started 5 days ago. Patient states she came in contact with a girl at "[employer] that was positive for C. diff last week. Patient denies blood in the stool. No vomiting. Her abdominal pains is 6 out of 10 and it is described as a pressure in her abdomen. No urine symptoms. Patient states her abdomen hurts all over. She denies back pain. No chest pain or shortness of breath. Nursing Notes were reviewed.     PAST MEDICAL HISTORY     Past Medical History:   Diagnosis Date    Allergic rhinitis     Arthritis     Arthritis     Asthma     CAD (coronary artery disease) 8/6/13    cabg x 2    Chlorine inhalation lung injury ([address]     Severe irritability and airway reactivity     Chronic back pain     Dyslipidemia     Dyspnea on exertion     Eczema     GERD (gastroesophageal reflux disease)     Headache     MIGRAINES    HTN (hypertension)     MVP (mitral valve prolapse)     Obesity     Poison ivy     States in lungs    Sleep apnea     COMPLIANT ON CPAP     Thoracic outlet syndrome     bilateral    Thyroid disease          SURGICAL HISTORY       Past Surgical History:   Procedure Laterality Date    BACK SURGERY      lumbar fusion    CARDIAC CATHETERIZATION      CARPAL TUNNEL RELEASE Right     CHOLECYSTECTOMY      CORONARY ARTERY BYPASS GRAFT  8/6/13    cabg x 2     FIRST RIB REMOVAL      bilateral    HYSTERECTOMY      KNEE CARTILAGE SURGERY Mouth/Throat: Uvula is midline, oropharynx is clear and moist and mucous membranes are normal.   Eyes: Conjunctivae, EOM and lids are normal. Pupils are equal, round, and reactive to light. Neck: Normal range of motion and full passive range of motion without pain. Neck supple. Cardiovascular: Normal rate, regular rhythm, S1 normal, S2 normal, normal heart sounds, intact distal pulses and normal pulses. Pulmonary/Chest: Effort normal and breath sounds normal.   Abdominal: Soft. Normal appearance and bowel sounds are normal. She exhibits distension. There is generalized tenderness. There is no rigidity, no rebound and no guarding. Musculoskeletal: Normal range of motion. Neurological: She is alert and oriented to person, place, and time. She has normal strength and normal reflexes. No cranial nerve deficit or sensory deficit. Skin: Skin is warm, dry and intact. Psychiatric: She has a normal mood and affect.  Her speech is normal and behavior is normal. Judgment and thought content normal. Cognition and memory are normal.         DIAGNOSTIC RESULTS     EKG: All EKG's are interpreted by the Emergency Department Physician who either signs or Co-signs this chart in the absence of a cardiologist.        RADIOLOGY:   Non-plain film images such as CT, Ultrasound and MRI are read by the radiologist. Plain radiographic images are visualized and preliminarily interpreted by the emergency physician with the below findings:        Interpretation per the Radiologist below, if available at the time of this note:    No orders to display         ED BEDSIDE ULTRASOUND:   Performed by ED Physician - none    LABS:  Labs Reviewed   BASIC METABOLIC PANEL - Abnormal; Notable for the following:        Result Value    Glucose 144 (*)     All other components within normal limits   CBC WITH AUTO DIFFERENTIAL - Abnormal; Notable for the following:     RDW 15.3 (*)     Monocytes 9 (*)     All other components within normal tablet by mouth 3 times daily as needed (Abdominal pain)    ONDANSETRON (ZOFRAN ODT) 4 MG DISINTEGRATING TABLET    Take 1 tablet by mouth every 8 hours as needed for Nausea or Vomiting     Electronically signed by Harjeet Borja NP on 12/3/2017 at 7:18 PM            Harjeet Borja NP  12/03/17 3949

## 2017-12-04 NOTE — ED PROVIDER NOTES
The patient was seen and examined by me in conjunction with the mid-level provider. I agree with his/her assessment and treatment plan. The patient is been unable to provide us a stool specimen will follow-up with her family doctor.      Bertha Garrett MD  12/03/17 9497

## 2018-01-03 ENCOUNTER — OFFICE VISIT (OUTPATIENT)
Dept: PULMONOLOGY | Age: 63
End: 2018-01-03
Payer: COMMERCIAL

## 2018-01-03 VITALS
SYSTOLIC BLOOD PRESSURE: 145 MMHG | RESPIRATION RATE: 14 BRPM | WEIGHT: 293 LBS | OXYGEN SATURATION: 96 % | HEIGHT: 66 IN | BODY MASS INDEX: 47.09 KG/M2 | TEMPERATURE: 97.6 F | HEART RATE: 65 BPM | DIASTOLIC BLOOD PRESSURE: 83 MMHG

## 2018-01-03 DIAGNOSIS — G47.33 OSA ON CPAP: ICD-10-CM

## 2018-01-03 DIAGNOSIS — J30.89 NON-SEASONAL ALLERGIC RHINITIS, UNSPECIFIED CHRONICITY, UNSPECIFIED TRIGGER: ICD-10-CM

## 2018-01-03 DIAGNOSIS — J45.991 COUGH VARIANT ASTHMA: Primary | ICD-10-CM

## 2018-01-03 DIAGNOSIS — Z99.89 OSA ON CPAP: ICD-10-CM

## 2018-01-03 DIAGNOSIS — K21.9 GASTROESOPHAGEAL REFLUX DISEASE WITHOUT ESOPHAGITIS: ICD-10-CM

## 2018-01-03 PROCEDURE — 99214 OFFICE O/P EST MOD 30 MIN: CPT | Performed by: INTERNAL MEDICINE

## 2018-01-03 PROCEDURE — 3017F COLORECTAL CA SCREEN DOC REV: CPT | Performed by: INTERNAL MEDICINE

## 2018-01-03 PROCEDURE — G8427 DOCREV CUR MEDS BY ELIG CLIN: HCPCS | Performed by: INTERNAL MEDICINE

## 2018-01-03 PROCEDURE — G8599 NO ASA/ANTIPLAT THER USE RNG: HCPCS | Performed by: INTERNAL MEDICINE

## 2018-01-03 PROCEDURE — G8484 FLU IMMUNIZE NO ADMIN: HCPCS | Performed by: INTERNAL MEDICINE

## 2018-01-03 PROCEDURE — 1036F TOBACCO NON-USER: CPT | Performed by: INTERNAL MEDICINE

## 2018-01-03 PROCEDURE — G8417 CALC BMI ABV UP PARAM F/U: HCPCS | Performed by: INTERNAL MEDICINE

## 2018-01-03 PROCEDURE — 3014F SCREEN MAMMO DOC REV: CPT | Performed by: INTERNAL MEDICINE

## 2018-01-03 RX ORDER — OXYBUTYNIN CHLORIDE 5 MG/1
5 TABLET ORAL 3 TIMES DAILY
Status: ON HOLD | COMMUNITY
End: 2018-04-10

## 2018-01-03 RX ORDER — GABAPENTIN 100 MG/1
100 CAPSULE ORAL 3 TIMES DAILY
COMMUNITY

## 2018-01-03 NOTE — PROGRESS NOTES
PULMONARY OP  PROGRESS NOTE      Patient:  Julia Brown  YOB: 1955    MRN: [id number]     Acct:        Pt seen and Chart reviewed. Mr/Ms Julia Brown is here in followup for   1. Cough variant asthma     2. MARSHAL on CPAP     3. Gastroesophageal reflux disease without esophagitis     4. Non-seasonal allergic rhinitis, unspecified chronicity, unspecified trigger          Since she was seen last time, she She's using Serevent twice daily. She has history of allergy to albuterol because after she take albuterol she feels like that she is getting more short of breath and her throat is closing. She had the same feeling with steroids. Also and it was mentioned that she is allergic to steroids and that is why she is not on inhaled corticosteroids. She is able to use Serevent twice daily. She denies any problem with Serevent denies any rash allergies or shortness of breath or wheezing or any other paradoxical symptoms with Serevent. She use Xopenex as a rescue inhaler but she really had to use the rescue inhaler as she take Serevent twice daily. Her cough is completely resolved while she take Serevent and denies chronic cough, wheezing, chest tightness, except when she has seasonal allergies during spring and fall. She denies shortness of breath. She claims that she can walk about a mile without getting shortness of breath. She is able to do all her regular activities. She also live in the basement and go to the ground floor without much problem   Because of the steroid allergies. She is not on nasal steroids for her allergic rhinitis. She was prescribed Astelin nasal spray, which give her headache and she stopped using it  She has GERD.   [facility] is followed [facility] and now she is on Nexium once daily instead of twice daily and dietary modification help her reflux and also her cough     She is compliant with her cpap and use it every night, she denies taking any naps most of the days and feels fresh after waking up in am     Initially    She is followed here for MARSHAL     She had h/o allergies to steroids per patient cause her to have throat closing while in ER previously for poison ivy and had to be observed in the hospitat  She has GERD and use nexium and followed with GI in [facility]    She has h/o reactive airways after chlorine exposure and was on Xopenex in the past and for last 1 to 1.5  year or more she had no problem with cough, sob or wheezing and had not required any bronchodilators and was doing very well until episode 3-4 months ago        Subjective:   Review of Systems -   General ROS: negative for fever, night sweats or wt loss  ENT ROS: Negative for -  nasal congestion and postnasal drip  Allergy and Immunology ROS: positive for - seasonal allergies  Respiratory ROS: Negative for dry cough, No shortness of breath on walking , no Wheezing, no orthopnea and no PND  Cardiovascular ROS: no chest pain or dyspnea on exertion  Gastrointestinal ROS: no abdominal pain, change in bowel habits, or black or bloody stools, positive for reflux and heartburn  Musculoskeletal ROS: negative for - numbness tingling of extremeties   Hem/Onc: negative for bleeding and bruising  Skin: negative for rash  : negative for hematuria, dysuria  CNS: negative for dizziness, weakness, diplopia      Sleep Medicine 4/25/2017 8/31/2016   Sitting and reading 0 0   Watching TV 0 0   Sitting, inactive in a public place (e.g. a theatre or a meeting) 0 0   As a passenger in a car for an hour without a break 0 0   Lying down to rest in the afternoon when circumstances permit 0 0   Sitting and talking to someone 0 0   Sitting quietly after a lunch without alcohol 0 0   In a car, while stopped for a few minutes in traffic 0 0   Total score 0 0       Allergies:   Allergies   Allergen Reactions    Albuterol Other (See Comments)     Burning in chest    Biaxin [Clarithromycin]     119%, FVC 3.48 105 %, [id number] 113%, TLC 5.15 96.7%, DLCO 20.78 106%    Blood Gases:   pH   Date Value Ref Range Status   08/06/2013 7.36  Final     PCO2   Date Value Ref Range Status   08/06/2013 42 mm Hg Final     PO2   Date Value Ref Range Status   08/06/2013 99 mm Hg Final     HCO3   Date Value Ref Range Status   08/06/2013 24.0 23 - 31 mmol/L Final     O2 Sat   Date Value Ref Range Status   08/06/2013 97 % Final       Radiological reports:    CXR   2/28/17  Lung parenchyma is clear without focal   airspace consolidation, sizeable pleural effusion, or pneumothorax       2/9/2016  Sternotomy wires are present. The heart appears normal size. Pulmonary vasculature is unremarkable. The lungs are clear. No free air. No displaced rib fractures are seen. CT A Scans chest 2/28/17  FINDINGS:     No evidence of pulmonary emboli.  No hilar or mediastinal masses are seen.  Thoracic aorta without acute abnormality.  No evidence of aneurysm or dissection. Lungs are free of consolidation.  No pneumothorax or pleural effusion.  No mass lesions are present. Minimal atelectasis at the left lung base. No acute findings in the upper abdomen. Diffuse fatty infiltration of the liver. Impression:     No significant, acute cardiopulmonary abnormalities.  No evidence of pulmonary embolus. Sleep Study 4/6/2015  Mild to Moderate MARSHAL with AHI of 14.4 and RDI of 15.1 and CPAP titration to cpap of 10 cm     Assessment:    ICD-10-CM ICD-9-CM    1. Cough variant asthma J45.991 493.82    2. MARSHAL on CPAP G47.33 327.23     Z99.89 V46.8    3. Gastroesophageal reflux disease without esophagitis K21.9 530.81    4.  Non-seasonal allergic rhinitis, unspecified chronicity, unspecified trigger J30.89 477.8      Last PFT's reviewed   CT scan chest and CXR reviewed from feb 27 2017 and no infiltrates seen and no abnormality detected  IgE was 18 and CBC with diff checked and were normal and no eosinophilia seen     Plan:    Continue nexium daily and follow up GI at [facility]  Reflux precautions  Avoid allergens and exposure  Continue Singulair daily  Continue Serevent twice daily  She had used albuterol in the past and not allergic to that but prefers Xopenex    CPAP at least 4 hrs qhs  Wt loss is recommended and discussed  Follow good sleep hygeine instructions  Use humidifier   Questions answered pertaining to diagnosis and management explained importance of compliance with therapy   Compliance data N/A and need data but pt is compliant per insurance requirements   RTC in 6 months      America Talbot MD             1/3/2018, 4:22 PM

## 2018-02-09 ENCOUNTER — HOSPITAL ENCOUNTER (OUTPATIENT)
Dept: MAMMOGRAPHY | Age: 63
Discharge: HOME | End: 2018-02-11
Payer: COMMERCIAL

## 2018-02-09 DIAGNOSIS — Z13.820 SCREENING FOR OSTEOPOROSIS: ICD-10-CM

## 2018-02-09 PROCEDURE — 77080 DXA BONE DENSITY AXIAL: CPT

## 2018-02-09 PROCEDURE — 77063 BREAST TOMOSYNTHESIS BI: CPT

## 2018-02-12 ENCOUNTER — HOSPITAL ENCOUNTER (OUTPATIENT)
Dept: ULTRASOUND IMAGING | Age: 63
Discharge: HOME | End: 2018-02-14
Payer: COMMERCIAL

## 2018-02-12 ENCOUNTER — HOSPITAL ENCOUNTER (OUTPATIENT)
Age: 63
Discharge: HOME | End: 2018-02-12
Payer: COMMERCIAL

## 2018-02-12 DIAGNOSIS — R39.198 INCREASING RESIDUAL URINE: ICD-10-CM

## 2018-02-12 LAB
ANION GAP SERPL CALCULATED.3IONS-SCNC: 11 MMOL/L (ref 9–17)
BUN BLDV-MCNC: 19 MG/DL (ref 8–23)
BUN/CREAT BLD: 22 (ref 9–20)
CALCIUM SERPL-MCNC: 9.5 MG/DL (ref 8.6–10.4)
CHLORIDE BLD-SCNC: 100 MMOL/L (ref 98–107)
CO2: 27 MMOL/L (ref 20–31)
CREAT SERPL-MCNC: 0.88 MG/DL (ref 0.5–0.9)
CREATININE URINE: 17.5 MG/DL (ref 28–217)
ESTIMATED AVERAGE GLUCOSE: 143 MG/DL
GFR AFRICAN AMERICAN: >60 ML/MIN
GFR NON-AFRICAN AMERICAN: >60 ML/MIN
GFR SERPL CREATININE-BSD FRML MDRD: ABNORMAL ML/MIN/{1.73_M2}
GFR SERPL CREATININE-BSD FRML MDRD: ABNORMAL ML/MIN/{1.73_M2}
GLUCOSE BLD-MCNC: 86 MG/DL (ref 70–99)
HBA1C MFR BLD: 6.6 % (ref 4–6)
MICROALBUMIN/CREAT 24H UR: <12 MG/L
MICROALBUMIN/CREAT UR-RTO: ABNORMAL MCG/MG CREAT
POTASSIUM SERPL-SCNC: 4 MMOL/L (ref 3.7–5.3)
SODIUM BLD-SCNC: 138 MMOL/L (ref 135–144)

## 2018-02-12 PROCEDURE — 82570 ASSAY OF URINE CREATININE: CPT

## 2018-02-12 PROCEDURE — 76775 US EXAM ABDO BACK WALL LIM: CPT

## 2018-02-12 PROCEDURE — 80048 BASIC METABOLIC PNL TOTAL CA: CPT

## 2018-02-12 PROCEDURE — 36415 COLL VENOUS BLD VENIPUNCTURE: CPT

## 2018-02-12 PROCEDURE — 83036 HEMOGLOBIN GLYCOSYLATED A1C: CPT

## 2018-02-12 PROCEDURE — 82043 UR ALBUMIN QUANTITATIVE: CPT

## 2018-03-25 ENCOUNTER — HOSPITAL ENCOUNTER (EMERGENCY)
Age: 63
Discharge: HOME | End: 2018-03-25
Attending: EMERGENCY MEDICINE
Payer: COMMERCIAL

## 2018-03-25 ENCOUNTER — APPOINTMENT (OUTPATIENT)
Dept: GENERAL RADIOLOGY | Age: 63
End: 2018-03-25
Payer: COMMERCIAL

## 2018-03-25 VITALS
BODY MASS INDEX: 47.09 KG/M2 | DIASTOLIC BLOOD PRESSURE: 64 MMHG | RESPIRATION RATE: 20 BRPM | HEART RATE: 71 BPM | OXYGEN SATURATION: 97 % | WEIGHT: 293 LBS | TEMPERATURE: 98.6 F | HEIGHT: 66 IN | SYSTOLIC BLOOD PRESSURE: 140 MMHG

## 2018-03-25 DIAGNOSIS — S93.402A SPRAIN OF LEFT ANKLE, UNSPECIFIED LIGAMENT, INITIAL ENCOUNTER: ICD-10-CM

## 2018-03-25 DIAGNOSIS — S93.602A FOOT SPRAIN, LEFT, INITIAL ENCOUNTER: Primary | ICD-10-CM

## 2018-03-25 PROCEDURE — 73610 X-RAY EXAM OF ANKLE: CPT

## 2018-03-25 PROCEDURE — 73630 X-RAY EXAM OF FOOT: CPT

## 2018-03-25 PROCEDURE — 99283 EMERGENCY DEPT VISIT LOW MDM: CPT

## 2018-03-25 RX ORDER — OXYCODONE HYDROCHLORIDE AND ACETAMINOPHEN 5; 325 MG/1; MG/1
1 TABLET ORAL EVERY 6 HOURS PRN
Qty: 15 TABLET | Refills: 0 | Status: SHIPPED | OUTPATIENT
Start: 2018-03-25 | End: 2018-03-30

## 2018-03-25 ASSESSMENT — ENCOUNTER SYMPTOMS
FACIAL SWELLING: 0
SHORTNESS OF BREATH: 0
CONSTIPATION: 0
COUGH: 0
ABDOMINAL PAIN: 0
EYE DISCHARGE: 0
EYE REDNESS: 0
VOMITING: 0
DIARRHEA: 0
COLOR CHANGE: 0

## 2018-03-25 ASSESSMENT — PAIN DESCRIPTION - ORIENTATION
ORIENTATION: LEFT
ORIENTATION: LEFT

## 2018-03-25 ASSESSMENT — PAIN SCALES - GENERAL
PAINLEVEL_OUTOF10: 8
PAINLEVEL_OUTOF10: 8

## 2018-03-25 ASSESSMENT — PAIN DESCRIPTION - LOCATION
LOCATION: ANKLE;FOOT
LOCATION: ANKLE;FOOT

## 2018-03-25 ASSESSMENT — PAIN DESCRIPTION - DESCRIPTORS
DESCRIPTORS: SHARP
DESCRIPTORS: SHARP

## 2018-03-25 ASSESSMENT — PAIN DESCRIPTION - FREQUENCY
FREQUENCY: INTERMITTENT
FREQUENCY: INTERMITTENT

## 2018-03-25 ASSESSMENT — PAIN DESCRIPTION - PAIN TYPE: TYPE: ACUTE PAIN

## 2018-03-26 NOTE — ED PROVIDER NOTES
Ordering Physician Provided Reason for Exam: pain Acuity: Acute Type of Exam: Initial Relevant Medical/Surgical History: pt twisted ankle yesterday, pain in medial lt ankle/foot; ORDERING SYSTEM PROVIDED HISTORY: Pain, medial mall TECHNOLOGIST PROVIDED HISTORY: Reason for exam:->Pain, medial mall Ordering Physician Provided Reason for Exam: pain Acuity: Acute Type of Exam: Initial Relevant Medical/Surgical History: pt twisted ankle yesterday, pain in medial lt ankle/foot FINDINGS: Left foot:  Anatomic alignment. Osteoarthritic changes in the 1st MTP joint. No acute fractures. Enthesopathy at the Achilles insertion and along the plantar surface of the calcaneus. Left ankle: Anatomic alignment. The ankle mortise is intact. No fractures. No acute abnormalities seen in the left ankle or left foot     Xr Foot Left (min 3 Views)    Result Date: 3/25/2018  EXAMINATION: 3 VIEWS OF THE LEFT FOOT; 3 VIEWS OF THE LEFT ANKLE 3/25/2018 8:25 pm COMPARISON: 02/09/2016 HISTORY: ORDERING SYSTEM PROVIDED HISTORY: Pain, prox plantar TECHNOLOGIST PROVIDED HISTORY: Reason for exam:->Pain, prox plantar Ordering Physician Provided Reason for Exam: pain Acuity: Acute Type of Exam: Initial Relevant Medical/Surgical History: pt twisted ankle yesterday, pain in medial lt ankle/foot; ORDERING SYSTEM PROVIDED HISTORY: Pain, medial mall TECHNOLOGIST PROVIDED HISTORY: Reason for exam:->Pain, medial mall Ordering Physician Provided Reason for Exam: pain Acuity: Acute Type of Exam: Initial Relevant Medical/Surgical History: pt twisted ankle yesterday, pain in medial lt ankle/foot FINDINGS: Left foot:  Anatomic alignment. Osteoarthritic changes in the 1st MTP joint. No acute fractures. Enthesopathy at the Achilles insertion and along the plantar surface of the calcaneus. Left ankle: Anatomic alignment. The ankle mortise is intact. No fractures.      No acute abnormalities seen in the left ankle or left foot     LABS:  Labs Reviewed - No data to display    All other labs were within normal range or not returned as of this dictation. EMERGENCY DEPARTMENT COURSE and DIFFERENTIAL DIAGNOSIS/MDM:   Vitals:    Vitals:    03/25/18 1939 03/25/18 1943   BP: (!) 140/64 (!) 140/64   Pulse: 71 71   Resp: 20 20   Temp: 98.6 °F (37 °C) 98.6 °F (37 °C)   TempSrc: Oral    SpO2: 97% 97%   Weight: (!) 302 lb 14.4 oz (137.4 kg) (!) 302 lb 9 oz (137.2 kg)   Height: 5' 6\" (1.676 m) 5' 6\" (1.676 m)       Orders Placed This Encounter   Medications    oxyCODONE-acetaminophen (PERCOCET) 5-325 MG per tablet     Sig: Take 1 tablet by mouth every 6 hours as needed for Pain for up to 5 days. Dispense:  15 tablet     Refill:  0       Medical Decision Making: X-rays are negative. Ace wrap applied [place] patient was checked by me and found to be appropriate. My clinical impression is that she has a foot sprain and ankle sprain. Treatment diagnosis and follow-up were discussed with the patient. CONSULTS:  None    PROCEDURES:  None    FINAL IMPRESSION      1. Foot sprain, left, initial encounter    2. Sprain of left ankle, unspecified ligament, initial encounter          DISPOSITION/PLAN   DISPOSITION Decision To Discharge 03/25/2018 08:50:43 PM      PATIENT REFERRED TO:   Deandra Morgan MD      As needed    [facility] ED  [address] [facility]  [phone]    If symptoms worsen    Kate Darling DPM  [address] [facility]  [phone]            DISCHARGE MEDICATIONS:     New Prescriptions    OXYCODONE-ACETAMINOPHEN (PERCOCET) 5-325 MG PER TABLET    Take 1 tablet by mouth every 6 hours as needed for Pain for up to 5 days.          (Please note that portions of this note were completed with a voice recognition program.  Efforts were made to edit the dictations but occasionally words are mis-transcribed.)    Peyman Quarles MD  Attending Emergency Physician             Peyman Quarles MD  03/25/18 7563

## 2018-04-10 ENCOUNTER — HOSPITAL ENCOUNTER (OUTPATIENT)
Age: 63
Setting detail: OUTPATIENT SURGERY
Discharge: HOME | End: 2018-04-10
Attending: UROLOGY | Admitting: UROLOGY
Payer: COMMERCIAL

## 2018-04-10 VITALS
BODY MASS INDEX: 47.09 KG/M2 | OXYGEN SATURATION: 96 % | DIASTOLIC BLOOD PRESSURE: 64 MMHG | RESPIRATION RATE: 16 BRPM | TEMPERATURE: 96.8 F | HEART RATE: 64 BPM | WEIGHT: 293 LBS | HEIGHT: 66 IN | SYSTOLIC BLOOD PRESSURE: 127 MMHG

## 2018-04-10 PROBLEM — R32 INCONTINENCE OF URINE IN FEMALE: Status: ACTIVE | Noted: 2018-04-10

## 2018-04-10 LAB
BILIRUBIN URINE: NEGATIVE
COLOR: YELLOW
COMMENT UA: NORMAL
GLUCOSE URINE: NEGATIVE
KETONES, URINE: NEGATIVE
LEUKOCYTE ESTERASE, URINE: NEGATIVE
NITRITE, URINE: NEGATIVE
PH UA: 6 (ref 5–8)
PROTEIN UA: NEGATIVE
SPECIFIC GRAVITY UA: 1.02 (ref 1–1.03)
TURBIDITY: CLEAR
URINE HGB: NEGATIVE
UROBILINOGEN, URINE: NORMAL

## 2018-04-10 PROCEDURE — 7100000010 HC PHASE II RECOVERY - FIRST 15 MIN: Performed by: UROLOGY

## 2018-04-10 PROCEDURE — 3600000012 HC SURGERY LEVEL 2 ADDTL 15MIN: Performed by: UROLOGY

## 2018-04-10 PROCEDURE — 7100000011 HC PHASE II RECOVERY - ADDTL 15 MIN: Performed by: UROLOGY

## 2018-04-10 PROCEDURE — 3600000002 HC SURGERY LEVEL 2 BASE: Performed by: UROLOGY

## 2018-04-10 PROCEDURE — 81003 URINALYSIS AUTO W/O SCOPE: CPT

## 2018-04-10 PROCEDURE — 6370000000 HC RX 637 (ALT 250 FOR IP): Performed by: UROLOGY

## 2018-04-10 RX ORDER — PIOGLITAZONEHYDROCHLORIDE 15 MG/1
15 TABLET ORAL DAILY
Status: ON HOLD | COMMUNITY
End: 2019-11-22

## 2018-04-10 RX ORDER — CLINDAMYCIN HYDROCHLORIDE 300 MG/1
300 CAPSULE ORAL 3 TIMES DAILY
Qty: 9 CAPSULE | Refills: 0 | Status: SHIPPED | OUTPATIENT
Start: 2018-04-10 | End: 2018-04-13

## 2018-04-10 RX ORDER — SODIUM CHLORIDE, SODIUM LACTATE, POTASSIUM CHLORIDE, CALCIUM CHLORIDE 600; 310; 30; 20 MG/100ML; MG/100ML; MG/100ML; MG/100ML
INJECTION, SOLUTION INTRAVENOUS CONTINUOUS
Status: DISCONTINUED | OUTPATIENT
Start: 2018-04-10 | End: 2018-04-10

## 2018-04-10 RX ORDER — LIDOCAINE HYDROCHLORIDE 10 MG/ML
5 INJECTION, SOLUTION INFILTRATION; PERINEURAL ONCE
Status: DISCONTINUED | OUTPATIENT
Start: 2018-04-10 | End: 2018-04-10

## 2018-04-10 ASSESSMENT — PAIN SCALES - GENERAL: PAINLEVEL_OUTOF10: 0

## 2018-04-10 ASSESSMENT — PAIN - FUNCTIONAL ASSESSMENT: PAIN_FUNCTIONAL_ASSESSMENT: 0-10

## 2018-04-25 ENCOUNTER — HOSPITAL ENCOUNTER (EMERGENCY)
Age: 63
Discharge: HOME | End: 2018-04-25
Attending: EMERGENCY MEDICINE
Payer: COMMERCIAL

## 2018-04-25 ENCOUNTER — APPOINTMENT (OUTPATIENT)
Dept: CT IMAGING | Age: 63
End: 2018-04-25
Payer: COMMERCIAL

## 2018-04-25 VITALS
OXYGEN SATURATION: 95 % | BODY MASS INDEX: 47.09 KG/M2 | TEMPERATURE: 98.2 F | RESPIRATION RATE: 18 BRPM | HEART RATE: 56 BPM | HEIGHT: 66 IN | DIASTOLIC BLOOD PRESSURE: 74 MMHG | SYSTOLIC BLOOD PRESSURE: 154 MMHG | WEIGHT: 293 LBS

## 2018-04-25 DIAGNOSIS — S29.019A THORACIC MYOFASCIAL STRAIN, INITIAL ENCOUNTER: Primary | ICD-10-CM

## 2018-04-25 LAB
ABSOLUTE EOS #: 0.2 K/UL (ref 0–0.4)
ABSOLUTE IMMATURE GRANULOCYTE: ABNORMAL K/UL (ref 0–0.3)
ABSOLUTE LYMPH #: 2.7 K/UL (ref 1–4.8)
ABSOLUTE MONO #: 0.5 K/UL (ref 0.2–0.8)
ANION GAP SERPL CALCULATED.3IONS-SCNC: 13 MMOL/L (ref 9–17)
BASOPHILS # BLD: 1 % (ref 0–2)
BASOPHILS ABSOLUTE: 0.1 K/UL (ref 0–0.2)
BILIRUBIN URINE: NEGATIVE
BUN BLDV-MCNC: 22 MG/DL (ref 8–23)
BUN/CREAT BLD: 22 (ref 9–20)
CALCIUM SERPL-MCNC: 9.3 MG/DL (ref 8.6–10.4)
CHLORIDE BLD-SCNC: 102 MMOL/L (ref 98–107)
CO2: 25 MMOL/L (ref 20–31)
COLOR: ABNORMAL
COMMENT UA: ABNORMAL
CREAT SERPL-MCNC: 0.98 MG/DL (ref 0.5–0.9)
DIFFERENTIAL TYPE: ABNORMAL
EOSINOPHILS RELATIVE PERCENT: 2 % (ref 1–4)
GFR AFRICAN AMERICAN: >60 ML/MIN
GFR NON-AFRICAN AMERICAN: 58 ML/MIN
GFR SERPL CREATININE-BSD FRML MDRD: ABNORMAL ML/MIN/{1.73_M2}
GFR SERPL CREATININE-BSD FRML MDRD: ABNORMAL ML/MIN/{1.73_M2}
GLUCOSE BLD-MCNC: 105 MG/DL (ref 70–99)
GLUCOSE URINE: NEGATIVE
HCT VFR BLD CALC: 43.9 % (ref 36–46)
HEMOGLOBIN: 14.4 G/DL (ref 12–16)
IMMATURE GRANULOCYTES: ABNORMAL %
KETONES, URINE: NEGATIVE
LACTIC ACID: 1.1 MMOL/L (ref 0.5–2.2)
LEUKOCYTE ESTERASE, URINE: NEGATIVE
LYMPHOCYTES # BLD: 32 % (ref 24–44)
MCH RBC QN AUTO: 28.6 PG (ref 26–34)
MCHC RBC AUTO-ENTMCNC: 32.8 G/DL (ref 31–37)
MCV RBC AUTO: 87.2 FL (ref 80–100)
MONOCYTES # BLD: 7 % (ref 1–7)
NITRITE, URINE: NEGATIVE
NRBC AUTOMATED: ABNORMAL PER 100 WBC
PDW BLD-RTO: 15.3 % (ref 11.5–14.5)
PH UA: 6 (ref 5–8)
PLATELET # BLD: 210 K/UL (ref 130–400)
PLATELET ESTIMATE: ABNORMAL
PMV BLD AUTO: 10.4 FL (ref 6–12)
POTASSIUM SERPL-SCNC: 4.3 MMOL/L (ref 3.7–5.3)
PROTEIN UA: NEGATIVE
RBC # BLD: 5.03 M/UL (ref 4–5.2)
RBC # BLD: ABNORMAL 10*6/UL
SEG NEUTROPHILS: 58 % (ref 36–66)
SEGMENTED NEUTROPHILS ABSOLUTE COUNT: 4.8 K/UL (ref 1.8–7.7)
SODIUM BLD-SCNC: 140 MMOL/L (ref 135–144)
SPECIFIC GRAVITY UA: 1.01 (ref 1–1.03)
TURBIDITY: CLEAR
URINE HGB: NEGATIVE
UROBILINOGEN, URINE: NORMAL
WBC # BLD: 8.2 K/UL (ref 3.5–11)
WBC # BLD: ABNORMAL 10*3/UL

## 2018-04-25 PROCEDURE — 99284 EMERGENCY DEPT VISIT MOD MDM: CPT

## 2018-04-25 PROCEDURE — 85025 COMPLETE CBC W/AUTO DIFF WBC: CPT

## 2018-04-25 PROCEDURE — 74176 CT ABD & PELVIS W/O CONTRAST: CPT

## 2018-04-25 PROCEDURE — 2580000003 HC RX 258: Performed by: EMERGENCY MEDICINE

## 2018-04-25 PROCEDURE — 87086 URINE CULTURE/COLONY COUNT: CPT

## 2018-04-25 PROCEDURE — 6360000002 HC RX W HCPCS: Performed by: EMERGENCY MEDICINE

## 2018-04-25 PROCEDURE — 80048 BASIC METABOLIC PNL TOTAL CA: CPT

## 2018-04-25 PROCEDURE — 96374 THER/PROPH/DIAG INJ IV PUSH: CPT

## 2018-04-25 PROCEDURE — 81003 URINALYSIS AUTO W/O SCOPE: CPT

## 2018-04-25 PROCEDURE — 6370000000 HC RX 637 (ALT 250 FOR IP): Performed by: EMERGENCY MEDICINE

## 2018-04-25 PROCEDURE — 83605 ASSAY OF LACTIC ACID: CPT

## 2018-04-25 PROCEDURE — 96375 TX/PRO/DX INJ NEW DRUG ADDON: CPT

## 2018-04-25 RX ORDER — 0.9 % SODIUM CHLORIDE 0.9 %
1000 INTRAVENOUS SOLUTION INTRAVENOUS ONCE
Status: COMPLETED | OUTPATIENT
Start: 2018-04-25 | End: 2018-04-25

## 2018-04-25 RX ORDER — ONDANSETRON 4 MG/1
4 TABLET, ORALLY DISINTEGRATING ORAL EVERY 8 HOURS PRN
Qty: 20 TABLET | Refills: 0 | Status: SHIPPED | OUTPATIENT
Start: 2018-04-25 | End: 2018-09-19

## 2018-04-25 RX ORDER — IBUPROFEN 800 MG/1
800 TABLET ORAL EVERY 8 HOURS PRN
Qty: 30 TABLET | Refills: 0 | Status: SHIPPED | OUTPATIENT
Start: 2018-04-25 | End: 2018-07-11

## 2018-04-25 RX ORDER — KETOROLAC TROMETHAMINE 30 MG/ML
30 INJECTION, SOLUTION INTRAMUSCULAR; INTRAVENOUS ONCE
Status: COMPLETED | OUTPATIENT
Start: 2018-04-25 | End: 2018-04-25

## 2018-04-25 RX ORDER — PROMETHAZINE HYDROCHLORIDE 25 MG/ML
25 INJECTION, SOLUTION INTRAMUSCULAR; INTRAVENOUS ONCE
Status: COMPLETED | OUTPATIENT
Start: 2018-04-25 | End: 2018-04-25

## 2018-04-25 RX ORDER — TIZANIDINE 4 MG/1
4 TABLET ORAL EVERY 6 HOURS PRN
Qty: 24 TABLET | Refills: 0 | Status: SHIPPED | OUTPATIENT
Start: 2018-04-25 | End: 2018-07-11

## 2018-04-25 RX ORDER — LIDOCAINE 50 MG/G
1 PATCH TOPICAL DAILY
Qty: 30 PATCH | Refills: 0 | Status: SHIPPED | OUTPATIENT
Start: 2018-04-25 | End: 2018-07-11

## 2018-04-25 RX ORDER — OXYCODONE HYDROCHLORIDE AND ACETAMINOPHEN 5; 325 MG/1; MG/1
2 TABLET ORAL ONCE
Status: COMPLETED | OUTPATIENT
Start: 2018-04-25 | End: 2018-04-25

## 2018-04-25 RX ADMIN — OXYCODONE HYDROCHLORIDE AND ACETAMINOPHEN 2 TABLET: 5; 325 TABLET ORAL at 21:27

## 2018-04-25 RX ADMIN — SODIUM CHLORIDE 1000 ML: 9 INJECTION, SOLUTION INTRAVENOUS at 19:41

## 2018-04-25 RX ADMIN — PROMETHAZINE HYDROCHLORIDE 25 MG: 25 INJECTION INTRAMUSCULAR; INTRAVENOUS at 19:48

## 2018-04-25 RX ADMIN — KETOROLAC TROMETHAMINE 30 MG: 30 INJECTION, SOLUTION INTRAMUSCULAR at 19:51

## 2018-04-25 ASSESSMENT — PAIN SCALES - GENERAL
PAINLEVEL_OUTOF10: 8
PAINLEVEL_OUTOF10: 7
PAINLEVEL_OUTOF10: 8
PAINLEVEL_OUTOF10: 8

## 2018-04-25 ASSESSMENT — PAIN DESCRIPTION - LOCATION: LOCATION: FLANK

## 2018-04-25 ASSESSMENT — PAIN DESCRIPTION - DESCRIPTORS: DESCRIPTORS: ACHING

## 2018-04-25 ASSESSMENT — PAIN DESCRIPTION - PAIN TYPE: TYPE: ACUTE PAIN

## 2018-04-26 LAB
CULTURE: NORMAL
CULTURE: NORMAL
Lab: NORMAL
SPECIMEN DESCRIPTION: NORMAL
SPECIMEN DESCRIPTION: NORMAL
STATUS: NORMAL

## 2018-05-24 ENCOUNTER — HOSPITAL ENCOUNTER (EMERGENCY)
Age: 63
Discharge: HOME | End: 2018-05-24
Payer: COMMERCIAL

## 2018-05-24 VITALS
TEMPERATURE: 97.7 F | RESPIRATION RATE: 16 BRPM | BODY MASS INDEX: 47.09 KG/M2 | WEIGHT: 293 LBS | HEIGHT: 66 IN | HEART RATE: 55 BPM | DIASTOLIC BLOOD PRESSURE: 77 MMHG | OXYGEN SATURATION: 97 % | SYSTOLIC BLOOD PRESSURE: 140 MMHG

## 2018-05-24 DIAGNOSIS — T15.92XA FOREIGN BODY OF LEFT EYE, INITIAL ENCOUNTER: Primary | ICD-10-CM

## 2018-05-24 PROCEDURE — 65205 REMOVE FOREIGN BODY FROM EYE: CPT

## 2018-05-24 PROCEDURE — 99283 EMERGENCY DEPT VISIT LOW MDM: CPT

## 2018-05-24 RX ORDER — SULFACETAMIDE SODIUM 100 MG/ML
2 SOLUTION/ DROPS OPHTHALMIC
Qty: 1 BOTTLE | Refills: 0 | Status: SHIPPED | OUTPATIENT
Start: 2018-05-24 | End: 2018-06-03

## 2018-05-24 ASSESSMENT — PAIN DESCRIPTION - LOCATION: LOCATION: EYE

## 2018-05-24 ASSESSMENT — ENCOUNTER SYMPTOMS
COLOR CHANGE: 0
EYE ITCHING: 0
EYE REDNESS: 0
FACIAL SWELLING: 0
EYE DISCHARGE: 0
EYE PAIN: 1
PHOTOPHOBIA: 0

## 2018-05-24 ASSESSMENT — VISUAL ACUITY
OD: 20/40
OU: 20/25
OS: 20/25

## 2018-05-24 ASSESSMENT — PAIN DESCRIPTION - FREQUENCY: FREQUENCY: CONTINUOUS

## 2018-05-24 ASSESSMENT — PAIN DESCRIPTION - ORIENTATION: ORIENTATION: LEFT

## 2018-05-24 ASSESSMENT — PAIN SCALES - GENERAL: PAINLEVEL_OUTOF10: 5

## 2018-05-24 ASSESSMENT — PAIN DESCRIPTION - DESCRIPTORS: DESCRIPTORS: DISCOMFORT

## 2018-05-25 ENCOUNTER — HOSPITAL ENCOUNTER (EMERGENCY)
Age: 63
Discharge: HOME | End: 2018-05-25
Attending: EMERGENCY MEDICINE
Payer: COMMERCIAL

## 2018-05-25 VITALS
TEMPERATURE: 97.4 F | DIASTOLIC BLOOD PRESSURE: 86 MMHG | OXYGEN SATURATION: 96 % | BODY MASS INDEX: 47.09 KG/M2 | WEIGHT: 293 LBS | SYSTOLIC BLOOD PRESSURE: 157 MMHG | HEIGHT: 66 IN | RESPIRATION RATE: 17 BRPM | HEART RATE: 63 BPM

## 2018-05-25 DIAGNOSIS — T15.92XD FOREIGN BODY OF LEFT EYE, SUBSEQUENT ENCOUNTER: Primary | ICD-10-CM

## 2018-05-25 DIAGNOSIS — R10.11 RIGHT UPPER QUADRANT ABDOMINAL PAIN: ICD-10-CM

## 2018-05-25 LAB
-: ABNORMAL
ABSOLUTE EOS #: 0.2 K/UL (ref 0–0.4)
ABSOLUTE IMMATURE GRANULOCYTE: ABNORMAL K/UL (ref 0–0.3)
ABSOLUTE LYMPH #: 0.9 K/UL (ref 1–4.8)
ABSOLUTE MONO #: 0.5 K/UL (ref 0.2–0.8)
ALBUMIN SERPL-MCNC: 4.2 G/DL (ref 3.5–5.2)
ALBUMIN/GLOBULIN RATIO: NORMAL (ref 1–2.5)
ALP BLD-CCNC: 57 U/L (ref 35–104)
ALT SERPL-CCNC: 26 U/L (ref 5–33)
AMORPHOUS: ABNORMAL
AMYLASE: 36 U/L (ref 28–100)
ANION GAP SERPL CALCULATED.3IONS-SCNC: 12 MMOL/L (ref 9–17)
AST SERPL-CCNC: 23 U/L
BACTERIA: ABNORMAL
BASOPHILS # BLD: 0 % (ref 0–2)
BASOPHILS ABSOLUTE: 0 K/UL (ref 0–0.2)
BILIRUB SERPL-MCNC: 0.76 MG/DL (ref 0.3–1.2)
BILIRUBIN DIRECT: 0.14 MG/DL
BILIRUBIN URINE: NEGATIVE
BILIRUBIN, INDIRECT: 0.62 MG/DL (ref 0–1)
BUN BLDV-MCNC: 20 MG/DL (ref 8–23)
BUN/CREAT BLD: 22 (ref 9–20)
CALCIUM SERPL-MCNC: 9.1 MG/DL (ref 8.6–10.4)
CASTS UA: ABNORMAL /LPF
CHLORIDE BLD-SCNC: 102 MMOL/L (ref 98–107)
CO2: 24 MMOL/L (ref 20–31)
COLOR: YELLOW
COMMENT UA: ABNORMAL
CREAT SERPL-MCNC: 0.89 MG/DL (ref 0.5–0.9)
CRYSTALS, UA: ABNORMAL /HPF
DIFFERENTIAL TYPE: ABNORMAL
EOSINOPHILS RELATIVE PERCENT: 2 % (ref 1–4)
EPITHELIAL CELLS UA: ABNORMAL /HPF (ref 0–5)
GFR AFRICAN AMERICAN: >60 ML/MIN
GFR NON-AFRICAN AMERICAN: >60 ML/MIN
GFR SERPL CREATININE-BSD FRML MDRD: ABNORMAL ML/MIN/{1.73_M2}
GFR SERPL CREATININE-BSD FRML MDRD: ABNORMAL ML/MIN/{1.73_M2}
GLOBULIN: NORMAL G/DL (ref 1.5–3.8)
GLUCOSE BLD-MCNC: 108 MG/DL (ref 70–99)
GLUCOSE URINE: NEGATIVE
HCT VFR BLD CALC: 47.8 % (ref 36–46)
HEMOGLOBIN: 15.6 G/DL (ref 12–16)
IMMATURE GRANULOCYTES: ABNORMAL %
KETONES, URINE: NEGATIVE
LEUKOCYTE ESTERASE, URINE: NEGATIVE
LIPASE: 17 U/L (ref 13–60)
LYMPHOCYTES # BLD: 10 % (ref 24–44)
MCH RBC QN AUTO: 28.3 PG (ref 26–34)
MCHC RBC AUTO-ENTMCNC: 32.7 G/DL (ref 31–37)
MCV RBC AUTO: 86.4 FL (ref 80–100)
MONOCYTES # BLD: 5 % (ref 1–7)
MUCUS: ABNORMAL
NITRITE, URINE: NEGATIVE
NRBC AUTOMATED: ABNORMAL PER 100 WBC
OTHER OBSERVATIONS UA: ABNORMAL
PDW BLD-RTO: 15.9 % (ref 11.5–14.5)
PH UA: 5.5 (ref 5–8)
PLATELET # BLD: 166 K/UL (ref 130–400)
PLATELET ESTIMATE: ABNORMAL
PMV BLD AUTO: 10.2 FL (ref 6–12)
POTASSIUM SERPL-SCNC: 4.3 MMOL/L (ref 3.7–5.3)
PROTEIN UA: NEGATIVE
RBC # BLD: 5.53 M/UL (ref 4–5.2)
RBC # BLD: ABNORMAL 10*6/UL
RBC UA: ABNORMAL /HPF (ref 0–2)
RENAL EPITHELIAL, UA: ABNORMAL /HPF
SEG NEUTROPHILS: 83 % (ref 36–66)
SEGMENTED NEUTROPHILS ABSOLUTE COUNT: 7.5 K/UL (ref 1.8–7.7)
SODIUM BLD-SCNC: 138 MMOL/L (ref 135–144)
SPECIFIC GRAVITY UA: 1.02 (ref 1–1.03)
TOTAL PROTEIN: 7.7 G/DL (ref 6.4–8.3)
TRICHOMONAS: ABNORMAL
TURBIDITY: ABNORMAL
URINE HGB: NEGATIVE
UROBILINOGEN, URINE: NORMAL
WBC # BLD: 9 K/UL (ref 3.5–11)
WBC # BLD: ABNORMAL 10*3/UL
WBC UA: ABNORMAL /HPF (ref 0–5)
YEAST: ABNORMAL

## 2018-05-25 PROCEDURE — 81001 URINALYSIS AUTO W/SCOPE: CPT

## 2018-05-25 PROCEDURE — 87086 URINE CULTURE/COLONY COUNT: CPT

## 2018-05-25 PROCEDURE — 87505 NFCT AGENT DETECTION GI: CPT

## 2018-05-25 PROCEDURE — 83690 ASSAY OF LIPASE: CPT

## 2018-05-25 PROCEDURE — 99283 EMERGENCY DEPT VISIT LOW MDM: CPT

## 2018-05-25 PROCEDURE — 96374 THER/PROPH/DIAG INJ IV PUSH: CPT

## 2018-05-25 PROCEDURE — 80048 BASIC METABOLIC PNL TOTAL CA: CPT

## 2018-05-25 PROCEDURE — 6360000002 HC RX W HCPCS: Performed by: NURSE PRACTITIONER

## 2018-05-25 PROCEDURE — 80076 HEPATIC FUNCTION PANEL: CPT

## 2018-05-25 PROCEDURE — 96375 TX/PRO/DX INJ NEW DRUG ADDON: CPT

## 2018-05-25 PROCEDURE — 85025 COMPLETE CBC W/AUTO DIFF WBC: CPT

## 2018-05-25 PROCEDURE — 2580000003 HC RX 258: Performed by: NURSE PRACTITIONER

## 2018-05-25 PROCEDURE — 82150 ASSAY OF AMYLASE: CPT

## 2018-05-25 RX ORDER — 0.9 % SODIUM CHLORIDE 0.9 %
500 INTRAVENOUS SOLUTION INTRAVENOUS ONCE
Status: DISCONTINUED | OUTPATIENT
Start: 2018-05-25 | End: 2018-05-26 | Stop reason: HOSPADM

## 2018-05-25 RX ORDER — 0.9 % SODIUM CHLORIDE 0.9 %
500 INTRAVENOUS SOLUTION INTRAVENOUS ONCE
Status: COMPLETED | OUTPATIENT
Start: 2018-05-25 | End: 2018-05-25

## 2018-05-25 RX ORDER — ONDANSETRON 2 MG/ML
4 INJECTION INTRAMUSCULAR; INTRAVENOUS ONCE
Status: COMPLETED | OUTPATIENT
Start: 2018-05-25 | End: 2018-05-25

## 2018-05-25 RX ADMIN — HYDROMORPHONE HYDROCHLORIDE 0.5 MG: 1 INJECTION, SOLUTION INTRAMUSCULAR; INTRAVENOUS; SUBCUTANEOUS at 18:55

## 2018-05-25 RX ADMIN — SODIUM CHLORIDE 500 ML: 9 INJECTION, SOLUTION INTRAVENOUS at 18:53

## 2018-05-25 RX ADMIN — ONDANSETRON 4 MG: 2 INJECTION, SOLUTION INTRAMUSCULAR; INTRAVENOUS at 18:55

## 2018-05-25 ASSESSMENT — PAIN DESCRIPTION - PAIN TYPE: TYPE: ACUTE PAIN

## 2018-05-25 ASSESSMENT — ENCOUNTER SYMPTOMS
COUGH: 0
PHOTOPHOBIA: 0
VOMITING: 0
BLOOD IN STOOL: 0
EYE ITCHING: 0
CHEST TIGHTNESS: 0
BACK PAIN: 0
SHORTNESS OF BREATH: 0
NAUSEA: 1
FACIAL SWELLING: 0
EYE DISCHARGE: 0
DIARRHEA: 1
ABDOMINAL PAIN: 1

## 2018-05-25 ASSESSMENT — PAIN DESCRIPTION - FREQUENCY: FREQUENCY: INTERMITTENT

## 2018-05-25 ASSESSMENT — PAIN SCALES - GENERAL
PAINLEVEL_OUTOF10: 6
PAINLEVEL_OUTOF10: 7

## 2018-05-25 ASSESSMENT — PAIN DESCRIPTION - DESCRIPTORS: DESCRIPTORS: ACHING

## 2018-05-25 ASSESSMENT — PAIN DESCRIPTION - LOCATION: LOCATION: ABDOMEN;EYE

## 2018-05-25 ASSESSMENT — PAIN DESCRIPTION - ORIENTATION: ORIENTATION: RIGHT;UPPER

## 2018-05-26 LAB
CAMPYLOBACTER PCR: NORMAL
CULTURE: NORMAL
CULTURE: NORMAL
Lab: NORMAL
SALMONELLA PCR: NORMAL
SHIGATOXIN GENE PCR: NORMAL
SHIGELLA SP PCR: NORMAL
SPECIMEN DESCRIPTION: NORMAL
SPECIMEN DESCRIPTION: NORMAL
SPECIMEN: NORMAL
STATUS: NORMAL

## 2018-06-11 ENCOUNTER — HOSPITAL ENCOUNTER (OUTPATIENT)
Age: 63
Discharge: HOME | End: 2018-06-11
Payer: COMMERCIAL

## 2018-06-11 LAB
ABSOLUTE EOS #: 0.2 K/UL (ref 0–0.4)
ABSOLUTE IMMATURE GRANULOCYTE: ABNORMAL K/UL (ref 0–0.3)
ABSOLUTE LYMPH #: 2.2 K/UL (ref 1–4.8)
ABSOLUTE MONO #: 0.5 K/UL (ref 0.2–0.8)
ALBUMIN SERPL-MCNC: 4.1 G/DL (ref 3.5–5.2)
ALBUMIN/GLOBULIN RATIO: NORMAL (ref 1–2.5)
ALP BLD-CCNC: 50 U/L (ref 35–104)
ALT SERPL-CCNC: 23 U/L (ref 5–33)
AMYLASE: 40 U/L (ref 28–100)
ANION GAP SERPL CALCULATED.3IONS-SCNC: 11 MMOL/L (ref 9–17)
AST SERPL-CCNC: 22 U/L
BASOPHILS # BLD: 1 % (ref 0–2)
BASOPHILS ABSOLUTE: 0.1 K/UL (ref 0–0.2)
BILIRUB SERPL-MCNC: 0.54 MG/DL (ref 0.3–1.2)
BILIRUBIN DIRECT: 0.11 MG/DL
BILIRUBIN, INDIRECT: 0.43 MG/DL (ref 0–1)
BUN BLDV-MCNC: 20 MG/DL (ref 8–23)
BUN/CREAT BLD: 22 (ref 9–20)
CALCIUM SERPL-MCNC: 9.4 MG/DL (ref 8.6–10.4)
CHLORIDE BLD-SCNC: 101 MMOL/L (ref 98–107)
CO2: 27 MMOL/L (ref 20–31)
CREAT SERPL-MCNC: 0.89 MG/DL (ref 0.5–0.9)
CREATININE URINE: 89 MG/DL (ref 28–217)
DIFFERENTIAL TYPE: ABNORMAL
EOSINOPHILS RELATIVE PERCENT: 3 % (ref 1–4)
ESTIMATED AVERAGE GLUCOSE: 123 MG/DL
GFR AFRICAN AMERICAN: >60 ML/MIN
GFR NON-AFRICAN AMERICAN: >60 ML/MIN
GFR SERPL CREATININE-BSD FRML MDRD: ABNORMAL ML/MIN/{1.73_M2}
GFR SERPL CREATININE-BSD FRML MDRD: ABNORMAL ML/MIN/{1.73_M2}
GLOBULIN: NORMAL G/DL (ref 1.5–3.8)
GLUCOSE BLD-MCNC: 119 MG/DL (ref 70–99)
HBA1C MFR BLD: 5.9 % (ref 4–6)
HCT VFR BLD CALC: 43.7 % (ref 36–46)
HEMOGLOBIN: 14.6 G/DL (ref 12–16)
IMMATURE GRANULOCYTES: ABNORMAL %
LIPASE: 15 U/L (ref 13–60)
LYMPHOCYTES # BLD: 29 % (ref 24–44)
MCH RBC QN AUTO: 28.7 PG (ref 26–34)
MCHC RBC AUTO-ENTMCNC: 33.4 G/DL (ref 31–37)
MCV RBC AUTO: 86.1 FL (ref 80–100)
MICROALBUMIN/CREAT 24H UR: <12 MG/L
MICROALBUMIN/CREAT UR-RTO: NORMAL MCG/MG CREAT
MONOCYTES # BLD: 6 % (ref 1–7)
NRBC AUTOMATED: ABNORMAL PER 100 WBC
PDW BLD-RTO: 15.5 % (ref 11.5–14.5)
PLATELET # BLD: 173 K/UL (ref 130–400)
PLATELET ESTIMATE: ABNORMAL
PMV BLD AUTO: 10 FL (ref 6–12)
POTASSIUM SERPL-SCNC: 4.5 MMOL/L (ref 3.7–5.3)
RBC # BLD: 5.07 M/UL (ref 4–5.2)
RBC # BLD: ABNORMAL 10*6/UL
SEG NEUTROPHILS: 61 % (ref 36–66)
SEGMENTED NEUTROPHILS ABSOLUTE COUNT: 4.6 K/UL (ref 1.8–7.7)
SODIUM BLD-SCNC: 139 MMOL/L (ref 135–144)
T3 TOTAL: 145 NG/DL (ref 80–200)
T4 TOTAL: 8.2 UG/DL (ref 4.5–12)
TOTAL PROTEIN: 7.3 G/DL (ref 6.4–8.3)
TSH SERPL DL<=0.05 MIU/L-ACNC: 2 MIU/L (ref 0.3–5)
WBC # BLD: 7.6 K/UL (ref 3.5–11)
WBC # BLD: ABNORMAL 10*3/UL

## 2018-06-11 PROCEDURE — 84436 ASSAY OF TOTAL THYROXINE: CPT

## 2018-06-11 PROCEDURE — 80048 BASIC METABOLIC PNL TOTAL CA: CPT

## 2018-06-11 PROCEDURE — 83036 HEMOGLOBIN GLYCOSYLATED A1C: CPT

## 2018-06-11 PROCEDURE — 85025 COMPLETE CBC W/AUTO DIFF WBC: CPT

## 2018-06-11 PROCEDURE — 82570 ASSAY OF URINE CREATININE: CPT

## 2018-06-11 PROCEDURE — 82043 UR ALBUMIN QUANTITATIVE: CPT

## 2018-06-11 PROCEDURE — 36415 COLL VENOUS BLD VENIPUNCTURE: CPT

## 2018-06-11 PROCEDURE — 82150 ASSAY OF AMYLASE: CPT

## 2018-06-11 PROCEDURE — 84480 ASSAY TRIIODOTHYRONINE (T3): CPT

## 2018-06-11 PROCEDURE — 83690 ASSAY OF LIPASE: CPT

## 2018-06-11 PROCEDURE — 84443 ASSAY THYROID STIM HORMONE: CPT

## 2018-06-11 PROCEDURE — 80076 HEPATIC FUNCTION PANEL: CPT

## 2018-07-11 ENCOUNTER — OFFICE VISIT (OUTPATIENT)
Dept: PULMONOLOGY | Age: 63
End: 2018-07-11
Payer: COMMERCIAL

## 2018-07-11 VITALS
TEMPERATURE: 97.4 F | HEIGHT: 66 IN | HEART RATE: 54 BPM | DIASTOLIC BLOOD PRESSURE: 69 MMHG | BODY MASS INDEX: 47.09 KG/M2 | RESPIRATION RATE: 16 BRPM | WEIGHT: 293 LBS | OXYGEN SATURATION: 96 % | SYSTOLIC BLOOD PRESSURE: 139 MMHG

## 2018-07-11 DIAGNOSIS — J45.991 COUGH VARIANT ASTHMA: Primary | ICD-10-CM

## 2018-07-11 DIAGNOSIS — G47.33 OSA ON CPAP: ICD-10-CM

## 2018-07-11 DIAGNOSIS — J30.1 ALLERGIC RHINITIS DUE TO POLLEN, UNSPECIFIED CHRONICITY, UNSPECIFIED SEASONALITY: ICD-10-CM

## 2018-07-11 DIAGNOSIS — E66.01 MORBID OBESITY WITH BMI OF 50.0-59.9, ADULT (HCC): ICD-10-CM

## 2018-07-11 DIAGNOSIS — Z99.89 OSA ON CPAP: ICD-10-CM

## 2018-07-11 PROCEDURE — 3017F COLORECTAL CA SCREEN DOC REV: CPT | Performed by: INTERNAL MEDICINE

## 2018-07-11 PROCEDURE — 99214 OFFICE O/P EST MOD 30 MIN: CPT | Performed by: INTERNAL MEDICINE

## 2018-07-11 PROCEDURE — 1036F TOBACCO NON-USER: CPT | Performed by: INTERNAL MEDICINE

## 2018-07-11 PROCEDURE — G8427 DOCREV CUR MEDS BY ELIG CLIN: HCPCS | Performed by: INTERNAL MEDICINE

## 2018-07-11 PROCEDURE — G8417 CALC BMI ABV UP PARAM F/U: HCPCS | Performed by: INTERNAL MEDICINE

## 2018-07-11 PROCEDURE — G8599 NO ASA/ANTIPLAT THER USE RNG: HCPCS | Performed by: INTERNAL MEDICINE

## 2018-07-11 NOTE — PROGRESS NOTES
corticosteroids. She did not have any problem with Serevent when she was taking Serevent. She used Xopenex as a rescue inhaler Her cough had completely resolved while she was taking Serevent  except when she has seasonal allergies during spring and fall. Because of the steroid allergies she was not on nasal steroids and she was prescribed Astelin nasal spray, which give her headache and she stopped using it.       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 and was on Xopenex in the past and for last 1 to 1.5  year or more she had no problem with cough, sob or wheezing and had not required any bronchodilators and was doing very well until episode 3-4 months ago        Subjective:   Review of Systems -   General ROS: negative for fever, night sweats or wt loss  ENT ROS: Negative for -  nasal congestion and postnasal drip  Allergy and Immunology ROS: positive for - seasonal allergies  Respiratory ROS: Negative for dry cough, No shortness of breath on walking , no Wheezing, no orthopnea and no PND  Cardiovascular ROS: no chest pain or dyspnea on exertion  Gastrointestinal ROS: no abdominal pain, change in bowel habits, or black or bloody stools, positive for reflux and heartburn  Musculoskeletal ROS: negative for - numbness tingling of extremeties, joints pain and joint swelling   Hem/Onc: negative for bleeding and bruising  Skin: negative for rash and skin lesion   : negative for hematuria, dysuria, frequency and nocturia  CNS: negative for dizziness, weakness, diplopia, tingling numbness headache seizure      Sleep Medicine 7/11/2018 8/31/2016   Sitting and reading 0 0   Watching TV 0 0   Sitting, inactive in a public place (e.g. a theatre or a meeting) 0 0   As a passenger in a car for an hour without a break 0 0   Lying down to rest in the afternoon when circumstances permit 0 0   Sitting and talking to someone 0 0   Sitting quietly after a lunch without alcohol 0 0   In a car, while stopped for a few minutes in traffic 0 0   Total score 0 0       Allergies: Allergies   Allergen Reactions    Albuterol Other (See Comments)     Burning in chest    Biaxin [Clarithromycin]     Ceclor [Cefaclor]     Duricef [Cefadroxil]     Fentanyl     Medrol [Methylprednisolone]     Morphine     Norco [Hydrocodone-Acetaminophen] Hives    Penicillins     Percocet [Oxycodone-Acetaminophen]      Only the     Prednisone Swelling     IV and oral. Makes tongue swell. Medications:  Outpatient Encounter Prescriptions as of 7/11/2018   Medication Sig Dispense Refill    Mirabegron (MYRBETRIQ PO) Take by mouth daily      ondansetron (ZOFRAN ODT) 4 MG disintegrating tablet Take 1 tablet by mouth every 8 hours as needed for Nausea 20 tablet 0    pioglitazone (ACTOS) 15 MG tablet Take 15 mg by mouth daily      gabapentin (NEURONTIN) 100 MG capsule Take 100 mg by mouth 3 times daily.  salmeterol (SEREVENT DISKUS) 50 MCG/DOSE diskus inhaler Inhale 1 puff into the lungs 2 times daily 1 each 11    esomeprazole (NEXIUM) 24.65 MG CPDR capsule Take 1 capsule by mouth daily       spironolactone (ALDACTONE) 50 MG tablet Take 50 mg by mouth every other day Indications: Patient alternates 25mg and 50mg of spironolactone Patient alternates 25mg and 50mg of spironolactone      levothyroxine (SYNTHROID) 25 MCG tablet Take 25 mcg by mouth Daily      meloxicam (MOBIC) 15 MG tablet Take 15 mg by mouth daily      escitalopram (LEXAPRO) 10 MG tablet Take 10 mg by mouth nightly       nitroGLYCERIN (NITROSTAT) 0.4 MG SL tablet Place 0.4 mg under the tongue every 5 minutes as needed for Chest pain.       LORazepam (ATIVAN) 0.5 MG tablet Take 0.5 mg Date Value Ref Range Status   08/09/2013 2.1 1.5 - 2.5 mg/dL Final     Comment:     [facility] [id number] [facility] Victorina Crenshaw 3 [phone]     S. Phosphorus: No results found for: PHOS  S. Glucose:   POC Glucose   Date Value Ref Range Status   08/23/2017 130 (H) 65 - 105 mg/dL Final   08/08/2013 111 (H) 65 - 105 mg/dL Final   08/08/2013 123 (H) 65 - 105 mg/dL Final     Glycosylated hemoglobin A1C:   Hemoglobin A1C   Date Value Ref Range Status   06/11/2018 5.9 4.0 - 6.0 % Final       Pulmonary Functions Testing Results:    3/24/2015: FEV1 2.92  119%, FVC 3.48 105 %, RRP2DKY 113%, TLC 5.15 96.7%, DLCO 20.78 106%    Blood Gases:   pH   Date Value Ref Range Status   08/06/2013 7.36  Final     PCO2   Date Value Ref Range Status   08/06/2013 42 mm Hg Final     PO2   Date Value Ref Range Status   08/06/2013 99 mm Hg Final     HCO3   Date Value Ref Range Status   08/06/2013 24.0 23 - 31 mmol/L Final     O2 Sat   Date Value Ref Range Status   08/06/2013 97 % Final       Radiological reports:    CXR   2/28/17  Lung parenchyma is clear without focal   airspace consolidation, sizeable pleural effusion, or pneumothorax       2/9/2016  Sternotomy wires are present. The heart appears normal size. Pulmonary vasculature is unremarkable. The lungs are clear. No free air. No displaced rib fractures are seen. CT A Scans chest 2/28/17  FINDINGS:     No evidence of pulmonary emboli.  No hilar or mediastinal masses are seen.  Thoracic aorta without acute abnormality.  No evidence of aneurysm or dissection. Lungs are free of consolidation.  No pneumothorax or pleural effusion.  No mass lesions are present. Minimal atelectasis at the left lung base. No acute findings in the upper abdomen. Diffuse fatty infiltration of the liver. Impression:     No significant, acute cardiopulmonary abnormalities.  No evidence of pulmonary embolus.          Sleep Study 4/6/2015  Mild to Moderate MARSHAL with AHI of 14.4 and RDI of 15.1

## 2018-08-02 ENCOUNTER — HOSPITAL ENCOUNTER (OUTPATIENT)
Dept: GENERAL RADIOLOGY | Age: 63
Discharge: HOME | End: 2018-08-04
Payer: COMMERCIAL

## 2018-08-02 ENCOUNTER — HOSPITAL ENCOUNTER (OUTPATIENT)
Age: 63
Discharge: HOME | End: 2018-08-04
Payer: COMMERCIAL

## 2018-08-02 DIAGNOSIS — T14.90XA TRAUMA: ICD-10-CM

## 2018-08-02 PROCEDURE — 73502 X-RAY EXAM HIP UNI 2-3 VIEWS: CPT

## 2018-08-07 ENCOUNTER — HOSPITAL ENCOUNTER (OUTPATIENT)
Age: 63
Setting detail: OUTPATIENT SURGERY
Discharge: HOME | End: 2018-08-07
Attending: OPHTHALMOLOGY | Admitting: OPHTHALMOLOGY
Payer: COMMERCIAL

## 2018-08-07 VITALS
BODY MASS INDEX: 47.09 KG/M2 | SYSTOLIC BLOOD PRESSURE: 140 MMHG | OXYGEN SATURATION: 98 % | WEIGHT: 293 LBS | RESPIRATION RATE: 18 BRPM | HEART RATE: 55 BPM | TEMPERATURE: 97.5 F | DIASTOLIC BLOOD PRESSURE: 80 MMHG | HEIGHT: 66 IN

## 2018-08-07 PROBLEM — H25.11 AGE-RELATED NUCLEAR CATARACT, RIGHT: Status: RESOLVED | Noted: 2018-08-07 | Resolved: 2018-08-07

## 2018-08-07 PROCEDURE — 7100000010 HC PHASE II RECOVERY - FIRST 15 MIN: Performed by: OPHTHALMOLOGY

## 2018-08-07 PROCEDURE — 2500000003 HC RX 250 WO HCPCS: Performed by: OPHTHALMOLOGY

## 2018-08-07 PROCEDURE — 6360000002 HC RX W HCPCS: Performed by: OPHTHALMOLOGY

## 2018-08-07 PROCEDURE — 6370000000 HC RX 637 (ALT 250 FOR IP): Performed by: OPHTHALMOLOGY

## 2018-08-07 PROCEDURE — 3600000012 HC SURGERY LEVEL 2 ADDTL 15MIN: Performed by: OPHTHALMOLOGY

## 2018-08-07 PROCEDURE — 99153 MOD SED SAME PHYS/QHP EA: CPT | Performed by: OPHTHALMOLOGY

## 2018-08-07 PROCEDURE — 7100000011 HC PHASE II RECOVERY - ADDTL 15 MIN: Performed by: OPHTHALMOLOGY

## 2018-08-07 PROCEDURE — 3600000002 HC SURGERY LEVEL 2 BASE: Performed by: OPHTHALMOLOGY

## 2018-08-07 PROCEDURE — V2632 POST CHMBR INTRAOCULAR LENS: HCPCS | Performed by: OPHTHALMOLOGY

## 2018-08-07 PROCEDURE — 2500000003 HC RX 250 WO HCPCS

## 2018-08-07 PROCEDURE — 6360000002 HC RX W HCPCS

## 2018-08-07 PROCEDURE — 2700000000 HC OXYGEN THERAPY PER DAY

## 2018-08-07 PROCEDURE — 99152 MOD SED SAME PHYS/QHP 5/>YRS: CPT | Performed by: OPHTHALMOLOGY

## 2018-08-07 PROCEDURE — 2709999900 HC NON-CHARGEABLE SUPPLY: Performed by: OPHTHALMOLOGY

## 2018-08-07 DEVICE — LENS INTOCU +24.0 DIOPT L13MM DIA6MM 0DEG HAPTIC ANG A: Type: IMPLANTABLE DEVICE | Site: EYE | Status: FUNCTIONAL

## 2018-08-07 RX ORDER — CYCLOPENTOLATE HYDROCHLORIDE 10 MG/ML
1 SOLUTION/ DROPS OPHTHALMIC EVERY 5 MIN PRN
Status: COMPLETED | OUTPATIENT
Start: 2018-08-07 | End: 2018-08-07

## 2018-08-07 RX ORDER — MIDAZOLAM HYDROCHLORIDE 1 MG/ML
INJECTION INTRAMUSCULAR; INTRAVENOUS PRN
Status: DISCONTINUED | OUTPATIENT
Start: 2018-08-07 | End: 2018-08-07 | Stop reason: HOSPADM

## 2018-08-07 RX ORDER — LORAZEPAM 1 MG/1
1 TABLET ORAL ONCE
Status: COMPLETED | OUTPATIENT
Start: 2018-08-07 | End: 2018-08-07

## 2018-08-07 RX ORDER — SODIUM CHLORIDE, SODIUM LACTATE, POTASSIUM CHLORIDE, CALCIUM CHLORIDE 600; 310; 30; 20 MG/100ML; MG/100ML; MG/100ML; MG/100ML
INJECTION, SOLUTION INTRAVENOUS CONTINUOUS
Status: DISCONTINUED | OUTPATIENT
Start: 2018-08-07 | End: 2018-08-07 | Stop reason: HOSPADM

## 2018-08-07 RX ORDER — KETOROLAC TROMETHAMINE 5 MG/ML
1 SOLUTION OPHTHALMIC EVERY 5 MIN PRN
Status: COMPLETED | OUTPATIENT
Start: 2018-08-07 | End: 2018-08-07

## 2018-08-07 RX ORDER — RANITIDINE 150 MG/1
150 TABLET ORAL 2 TIMES DAILY
COMMUNITY
End: 2020-07-14

## 2018-08-07 RX ORDER — NEOMYCIN SULFATE, POLYMYXIN B SULFATE, AND DEXAMETHASONE 3.5; 10000; 1 MG/G; [USP'U]/G; MG/G
OINTMENT OPHTHALMIC PRN
Status: DISCONTINUED | OUTPATIENT
Start: 2018-08-07 | End: 2018-08-07 | Stop reason: HOSPADM

## 2018-08-07 RX ORDER — BUPIVACAINE HYDROCHLORIDE 5 MG/ML
1 INJECTION, SOLUTION EPIDURAL; INTRACAUDAL SEE ADMIN INSTRUCTIONS
Status: DISCONTINUED | OUTPATIENT
Start: 2018-08-07 | End: 2018-08-07 | Stop reason: HOSPADM

## 2018-08-07 RX ORDER — BUPIVACAINE HYDROCHLORIDE 5 MG/ML
INJECTION, SOLUTION EPIDURAL; INTRACAUDAL PRN
Status: DISCONTINUED | OUTPATIENT
Start: 2018-08-07 | End: 2018-08-07 | Stop reason: HOSPADM

## 2018-08-07 RX ORDER — BALANCED SALT SOLUTION ENRICHED WITH BICARBONATE, DEXTROSE, AND GLUTATHIONE
KIT INTRAOCULAR PRN
Status: DISCONTINUED | OUTPATIENT
Start: 2018-08-07 | End: 2018-08-07 | Stop reason: HOSPADM

## 2018-08-07 RX ORDER — PHENYLEPHRINE HCL 2.5 %
1 DROPS OPHTHALMIC (EYE) EVERY 5 MIN PRN
Status: COMPLETED | OUTPATIENT
Start: 2018-08-07 | End: 2018-08-07

## 2018-08-07 RX ORDER — LIDOCAINE HYDROCHLORIDE 10 MG/ML
INJECTION, SOLUTION EPIDURAL; INFILTRATION; INTRACAUDAL; PERINEURAL PRN
Status: DISCONTINUED | OUTPATIENT
Start: 2018-08-07 | End: 2018-08-07 | Stop reason: HOSPADM

## 2018-08-07 RX ORDER — LIDOCAINE HYDROCHLORIDE 10 MG/ML
INJECTION, SOLUTION INFILTRATION; PERINEURAL PRN
Status: DISCONTINUED | OUTPATIENT
Start: 2018-08-07 | End: 2018-08-07 | Stop reason: HOSPADM

## 2018-08-07 RX ORDER — TOBRAMYCIN 3 MG/ML
1 SOLUTION/ DROPS OPHTHALMIC EVERY 5 MIN PRN
Status: DISCONTINUED | OUTPATIENT
Start: 2018-08-07 | End: 2018-08-07 | Stop reason: HOSPADM

## 2018-08-07 RX ORDER — TIMOLOL MALEATE 5 MG/ML
SOLUTION/ DROPS OPHTHALMIC PRN
Status: DISCONTINUED | OUTPATIENT
Start: 2018-08-07 | End: 2018-08-07 | Stop reason: HOSPADM

## 2018-08-07 RX ADMIN — BUPIVACAINE HYDROCHLORIDE 5 MG: 5 INJECTION, SOLUTION EPIDURAL; INTRACAUDAL; PERINEURAL at 07:53

## 2018-08-07 RX ADMIN — PHENYLEPHRINE HYDROCHLORIDE 1 DROP: 25 SOLUTION/ DROPS OPHTHALMIC at 07:30

## 2018-08-07 RX ADMIN — BUPIVACAINE HYDROCHLORIDE 5 MG: 5 INJECTION, SOLUTION EPIDURAL; INTRACAUDAL; PERINEURAL at 07:35

## 2018-08-07 RX ADMIN — PHENYLEPHRINE HYDROCHLORIDE 1 DROP: 25 SOLUTION/ DROPS OPHTHALMIC at 07:35

## 2018-08-07 RX ADMIN — TOBRAMYCIN 1 DROP: 3 SOLUTION OPHTHALMIC at 07:35

## 2018-08-07 RX ADMIN — KETOROLAC TROMETHAMINE 1 DROP: 5 SOLUTION/ DROPS OPHTHALMIC at 07:30

## 2018-08-07 RX ADMIN — TOBRAMYCIN 1 DROP: 3 SOLUTION OPHTHALMIC at 07:30

## 2018-08-07 RX ADMIN — LORAZEPAM 1 MG: 1 TABLET ORAL at 07:30

## 2018-08-07 RX ADMIN — CYCLOPENTOLATE HYDROCHLORIDE 1 DROP: 10 SOLUTION/ DROPS OPHTHALMIC at 07:30

## 2018-08-07 RX ADMIN — KETOROLAC TROMETHAMINE 1 DROP: 5 SOLUTION/ DROPS OPHTHALMIC at 07:35

## 2018-08-07 RX ADMIN — CYCLOPENTOLATE HYDROCHLORIDE 1 DROP: 10 SOLUTION/ DROPS OPHTHALMIC at 07:35

## 2018-08-07 ASSESSMENT — PAIN SCALES - GENERAL
PAINLEVEL_OUTOF10: 0

## 2018-08-07 ASSESSMENT — PAIN - FUNCTIONAL ASSESSMENT: PAIN_FUNCTIONAL_ASSESSMENT: 0-10

## 2018-08-07 NOTE — OP NOTE
to remove all excess viscoelastic. The eye was pressurized and the wounds were checked for leaks and none were found. The patient had antibiotic, steroid, timoptic and antibiotic/steroid ointment placed in the eye. The lid speculum was removed. The eye was covered with a shield. The patient went to the PACU in excellent condition, having tolerated the procedure extremely well and will follow up with me tomorrow for postop day 1 care. Post-op instructions were discussed with patient and family.      Fariha Johnson MD

## 2018-08-07 NOTE — H&P
Lianna Duke is a 59y.o. year old who presents for elective outpatient ophthalmic surgery with Alfa Rides A [facility]. The patient complains of decreased vision, glare and halos around lights, and trouble with vision for activities of daily living.       Past Medical History:   Diagnosis Date    Allergic rhinitis     Arthritis     Arthritis     Asthma     CAD (coronary artery disease) 8/6/13    cabg x 2    Chlorine inhalation lung injury ([address]     Severe irritability and airway reactivity     Chronic back pain     Dyslipidemia     Dyspnea on exertion     Eczema     GERD (gastroesophageal reflux disease)     Headache     MIGRAINES    HTN (hypertension)     MVP (mitral valve prolapse)     Obesity     Poison ivy     States in lungs    Sleep apnea     COMPLIANT ON CPAP     Thoracic outlet syndrome     bilateral    Thyroid disease        Past Surgical History:   Procedure Laterality Date    BACK SURGERY      lumbar fusion    CARDIAC CATHETERIZATION      CARPAL TUNNEL RELEASE Right     CHOLECYSTECTOMY      CORONARY ARTERY BYPASS GRAFT  8/6/13    cabg x 2     CYSTOSCOPY  04/10/2018    FIRST RIB REMOVAL      bilateral    HYSTERECTOMY      KNEE CARTILAGE SURGERY Right     OR CYSTOURETHROSCOPY N/A 4/10/2018    CYSTOSCOPY performed by Angus Parikh MD at [address] Left     TONSILLECTOMY      TOTAL KNEE ARTHROPLASTY Left     TOTAL KNEE ARTHROPLASTY Right 03/2018    ULNAR TUNNEL RELEASE Right     VARICOSE VEIN SURGERY      bilateral         Current Facility-Administered Medications:     lactated ringers infusion, , Intravenous, Continuous, Cheryl Florian MD      Allergies   Allergen Reactions    Albuterol Other (See Comments)     Burning in chest    Biaxin [Clarithromycin]     Ceclor [Cefaclor]     Duricef [Cefadroxil]     Fentanyl     Medrol [Methylprednisolone]     Morphine     Norco [Hydrocodone-Acetaminophen] Hives    Penicillins    

## 2018-08-28 ENCOUNTER — HOSPITAL ENCOUNTER (OUTPATIENT)
Age: 63
Setting detail: OUTPATIENT SURGERY
Discharge: HOME | End: 2018-08-28
Attending: OPHTHALMOLOGY | Admitting: OPHTHALMOLOGY
Payer: COMMERCIAL

## 2018-08-28 VITALS
HEIGHT: 66 IN | RESPIRATION RATE: 14 BRPM | WEIGHT: 293 LBS | HEART RATE: 57 BPM | TEMPERATURE: 97.3 F | BODY MASS INDEX: 47.09 KG/M2 | DIASTOLIC BLOOD PRESSURE: 58 MMHG | OXYGEN SATURATION: 96 % | SYSTOLIC BLOOD PRESSURE: 117 MMHG

## 2018-08-28 PROBLEM — H25.12 AGE-RELATED NUCLEAR CATARACT, LEFT: Status: RESOLVED | Noted: 2018-08-28 | Resolved: 2018-08-28

## 2018-08-28 PROCEDURE — 6360000002 HC RX W HCPCS: Performed by: OPHTHALMOLOGY

## 2018-08-28 PROCEDURE — 2500000003 HC RX 250 WO HCPCS: Performed by: OPHTHALMOLOGY

## 2018-08-28 PROCEDURE — 2709999900 HC NON-CHARGEABLE SUPPLY: Performed by: OPHTHALMOLOGY

## 2018-08-28 PROCEDURE — 7100000010 HC PHASE II RECOVERY - FIRST 15 MIN: Performed by: OPHTHALMOLOGY

## 2018-08-28 PROCEDURE — 6370000000 HC RX 637 (ALT 250 FOR IP): Performed by: OPHTHALMOLOGY

## 2018-08-28 PROCEDURE — 3600000003 HC SURGERY LEVEL 3 BASE: Performed by: OPHTHALMOLOGY

## 2018-08-28 PROCEDURE — 2580000003 HC RX 258: Performed by: OPHTHALMOLOGY

## 2018-08-28 PROCEDURE — V2632 POST CHMBR INTRAOCULAR LENS: HCPCS | Performed by: OPHTHALMOLOGY

## 2018-08-28 PROCEDURE — 99152 MOD SED SAME PHYS/QHP 5/>YRS: CPT | Performed by: OPHTHALMOLOGY

## 2018-08-28 PROCEDURE — 7100000011 HC PHASE II RECOVERY - ADDTL 15 MIN: Performed by: OPHTHALMOLOGY

## 2018-08-28 PROCEDURE — 3600000013 HC SURGERY LEVEL 3 ADDTL 15MIN: Performed by: OPHTHALMOLOGY

## 2018-08-28 DEVICE — LENS INTOCU +24.0 DIOPT L13MM DIA6MM 0DEG HAPTIC ANG A: Type: IMPLANTABLE DEVICE | Site: EYE | Status: FUNCTIONAL

## 2018-08-28 RX ORDER — BUPIVACAINE HYDROCHLORIDE 5 MG/ML
INJECTION, SOLUTION EPIDURAL; INTRACAUDAL PRN
Status: DISCONTINUED | OUTPATIENT
Start: 2018-08-28 | End: 2018-08-28 | Stop reason: HOSPADM

## 2018-08-28 RX ORDER — TOBRAMYCIN 3 MG/ML
1 SOLUTION/ DROPS OPHTHALMIC EVERY 5 MIN PRN
Status: DISCONTINUED | OUTPATIENT
Start: 2018-08-28 | End: 2018-08-28 | Stop reason: HOSPADM

## 2018-08-28 RX ORDER — KETOROLAC TROMETHAMINE 5 MG/ML
1 SOLUTION OPHTHALMIC EVERY 5 MIN PRN
Status: COMPLETED | OUTPATIENT
Start: 2018-08-28 | End: 2018-08-28

## 2018-08-28 RX ORDER — LORAZEPAM 1 MG/1
1 TABLET ORAL ONCE
Status: COMPLETED | OUTPATIENT
Start: 2018-08-28 | End: 2018-08-28

## 2018-08-28 RX ORDER — TIMOLOL MALEATE 5 MG/ML
SOLUTION/ DROPS OPHTHALMIC PRN
Status: DISCONTINUED | OUTPATIENT
Start: 2018-08-28 | End: 2018-08-28 | Stop reason: HOSPADM

## 2018-08-28 RX ORDER — BALANCED SALT SOLUTION ENRICHED WITH BICARBONATE, DEXTROSE, AND GLUTATHIONE
KIT INTRAOCULAR PRN
Status: DISCONTINUED | OUTPATIENT
Start: 2018-08-28 | End: 2018-08-28 | Stop reason: HOSPADM

## 2018-08-28 RX ORDER — MIDAZOLAM HYDROCHLORIDE 1 MG/ML
INJECTION INTRAMUSCULAR; INTRAVENOUS PRN
Status: DISCONTINUED | OUTPATIENT
Start: 2018-08-28 | End: 2018-08-28 | Stop reason: HOSPADM

## 2018-08-28 RX ORDER — NEOMYCIN SULFATE, POLYMYXIN B SULFATE, AND DEXAMETHASONE 3.5; 10000; 1 MG/G; [USP'U]/G; MG/G
OINTMENT OPHTHALMIC PRN
Status: DISCONTINUED | OUTPATIENT
Start: 2018-08-28 | End: 2018-08-28 | Stop reason: HOSPADM

## 2018-08-28 RX ORDER — SODIUM CHLORIDE, SODIUM LACTATE, POTASSIUM CHLORIDE, CALCIUM CHLORIDE 600; 310; 30; 20 MG/100ML; MG/100ML; MG/100ML; MG/100ML
INJECTION, SOLUTION INTRAVENOUS CONTINUOUS
Status: DISCONTINUED | OUTPATIENT
Start: 2018-08-28 | End: 2018-08-28 | Stop reason: HOSPADM

## 2018-08-28 RX ORDER — BUPIVACAINE HYDROCHLORIDE 5 MG/ML
1 INJECTION, SOLUTION EPIDURAL; INTRACAUDAL SEE ADMIN INSTRUCTIONS
Status: DISCONTINUED | OUTPATIENT
Start: 2018-08-28 | End: 2018-08-28 | Stop reason: HOSPADM

## 2018-08-28 RX ORDER — CYCLOPENTOLATE HYDROCHLORIDE 10 MG/ML
1 SOLUTION/ DROPS OPHTHALMIC EVERY 5 MIN PRN
Status: COMPLETED | OUTPATIENT
Start: 2018-08-28 | End: 2018-08-28

## 2018-08-28 RX ORDER — LIDOCAINE HYDROCHLORIDE 10 MG/ML
INJECTION, SOLUTION INFILTRATION; PERINEURAL PRN
Status: DISCONTINUED | OUTPATIENT
Start: 2018-08-28 | End: 2018-08-28 | Stop reason: HOSPADM

## 2018-08-28 RX ORDER — PHENYLEPHRINE HCL 2.5 %
1 DROPS OPHTHALMIC (EYE) EVERY 5 MIN PRN
Status: COMPLETED | OUTPATIENT
Start: 2018-08-28 | End: 2018-08-28

## 2018-08-28 RX ADMIN — KETOROLAC TROMETHAMINE 1 DROP: 5 SOLUTION OPHTHALMIC at 07:32

## 2018-08-28 RX ADMIN — BUPIVACAINE HYDROCHLORIDE 5 MG: 5 INJECTION, SOLUTION EPIDURAL; INTRACAUDAL at 07:32

## 2018-08-28 RX ADMIN — BUPIVACAINE HYDROCHLORIDE 5 MG: 5 INJECTION, SOLUTION EPIDURAL; INTRACAUDAL at 07:26

## 2018-08-28 RX ADMIN — CYCLOPENTOLATE HYDROCHLORIDE 1 DROP: 10 SOLUTION/ DROPS OPHTHALMIC at 07:26

## 2018-08-28 RX ADMIN — SODIUM CHLORIDE, POTASSIUM CHLORIDE, SODIUM LACTATE AND CALCIUM CHLORIDE: 600; 310; 30; 20 INJECTION, SOLUTION INTRAVENOUS at 07:43

## 2018-08-28 RX ADMIN — PHENYLEPHRINE HYDROCHLORIDE 1 DROP: 25 SOLUTION/ DROPS OPHTHALMIC at 07:32

## 2018-08-28 RX ADMIN — TOBRAMYCIN 1 DROP: 3 SOLUTION OPHTHALMIC at 07:26

## 2018-08-28 RX ADMIN — CYCLOPENTOLATE HYDROCHLORIDE 1 DROP: 10 SOLUTION/ DROPS OPHTHALMIC at 07:31

## 2018-08-28 RX ADMIN — PHENYLEPHRINE HYDROCHLORIDE 1 DROP: 25 SOLUTION/ DROPS OPHTHALMIC at 07:26

## 2018-08-28 RX ADMIN — KETOROLAC TROMETHAMINE 1 DROP: 5 SOLUTION OPHTHALMIC at 07:26

## 2018-08-28 RX ADMIN — LORAZEPAM 1 MG: 1 TABLET ORAL at 07:25

## 2018-08-28 RX ADMIN — TOBRAMYCIN 1 DROP: 3 SOLUTION OPHTHALMIC at 07:32

## 2018-08-28 ASSESSMENT — PAIN SCALES - GENERAL
PAINLEVEL_OUTOF10: 0

## 2018-08-28 ASSESSMENT — PAIN - FUNCTIONAL ASSESSMENT: PAIN_FUNCTIONAL_ASSESSMENT: 0-10

## 2018-08-28 NOTE — H&P
solution 1 drop, 1 drop, Left Eye, Q5 Min PRN, Yolanda López MD, 1 drop at 08/28/18 0732    bupivacaine (PF) (MARCAINE) 0.5 % injection 5 mg, 1 mL, Ophthalmic, See Admin Instructions, Yolanda López MD, 5 mg at 08/28/18 0732      Allergies   Allergen Reactions    Albuterol Other (See Comments)     Burning in chest    Biaxin [Clarithromycin]     Ceclor [Cefaclor]     Duricef [Cefadroxil]     Fentanyl     Medrol [Methylprednisolone]     Morphine     Norco [Hydrocodone-Acetaminophen] Hives    Penicillins     Percocet [Oxycodone-Acetaminophen]      Only the     Prednisone Swelling     IV and oral. Makes tongue swell. PHYSICAL EXAMINATION    Vitals:    08/28/18 0709   BP: 139/72   Pulse: 51   Resp: 16   Temp: 96.8 °F (36 °C)   SpO2: 97%       Gen: NAD  HEENT: Visually significant cataract   Pulm: CTA Bilaterally  Heart: RRR, no murmurs  Abd: soft, non-tender  Ext: no edema  Neuro: no focal deficits    Assessment:   1. Visually significant cataract   2. See preoperative ophthalmology notes    Plan:   1. Risks, benefits, alternatives to surgery discussed with the patient and family. 2. All questions were answered to their satisfaction. 3. Ok to proceed with surgery as planned.     Omid Law

## 2018-09-03 ENCOUNTER — HOSPITAL ENCOUNTER (EMERGENCY)
Age: 63
Discharge: HOME | End: 2018-09-03
Attending: EMERGENCY MEDICINE
Payer: COMMERCIAL

## 2018-09-03 VITALS
DIASTOLIC BLOOD PRESSURE: 69 MMHG | SYSTOLIC BLOOD PRESSURE: 152 MMHG | BODY MASS INDEX: 47.09 KG/M2 | HEIGHT: 66 IN | WEIGHT: 293 LBS | OXYGEN SATURATION: 100 % | RESPIRATION RATE: 18 BRPM | TEMPERATURE: 98 F | HEART RATE: 60 BPM

## 2018-09-03 DIAGNOSIS — S60.222A CONTUSION OF LEFT HAND, INITIAL ENCOUNTER: Primary | ICD-10-CM

## 2018-09-03 PROCEDURE — 99283 EMERGENCY DEPT VISIT LOW MDM: CPT

## 2018-09-03 RX ORDER — OXYCODONE HYDROCHLORIDE AND ACETAMINOPHEN 5; 325 MG/1; MG/1
1 TABLET ORAL EVERY 6 HOURS PRN
Qty: 12 TABLET | Refills: 0 | Status: SHIPPED | OUTPATIENT
Start: 2018-09-03 | End: 2018-09-06

## 2018-09-03 ASSESSMENT — PAIN SCALES - GENERAL
PAINLEVEL_OUTOF10: 10
PAINLEVEL_OUTOF10: 10

## 2018-09-03 ASSESSMENT — PAIN DESCRIPTION - ORIENTATION: ORIENTATION: LEFT

## 2018-09-03 ASSESSMENT — PAIN DESCRIPTION - DESCRIPTORS: DESCRIPTORS: TENDER;THROBBING

## 2018-09-03 ASSESSMENT — PAIN DESCRIPTION - LOCATION: LOCATION: HAND

## 2018-09-03 NOTE — ED PROVIDER NOTES
eMERGENCY dEPARTMENT eNCOUnter        [facility]    Chief Complaint   Patient presents with    Hand Pain     lt hand pain with bruising since IV DC'd post op 6 days ago       HPI    Mauro Phelan is a 59 y.o. female who presents with bruising to the left hand since having an IV Place for cataract surgery. Patient states that she has had pain, swelling and bruising despite using ibuprofen and heat and ice to the affected area. Patient denies any nausea, vomiting, redness, or nausea or vomiting. REVIEW OF SYSTEMS    See HPI for further details. Review of systems otherwise negative.      PAST MEDICAL HISTORY    Past Medical History:   Diagnosis Date    Allergic rhinitis     Arthritis     Arthritis     Asthma     CAD (coronary artery disease) 8/6/13    cabg x 2    Chlorine inhalation lung injury ([address]     Severe irritability and airway reactivity     Chronic back pain     Dyslipidemia     Dyspnea on exertion     Eczema     GERD (gastroesophageal reflux disease)     Headache     MIGRAINES    HTN (hypertension)     MVP (mitral valve prolapse)     Obesity     Poison ivy     States in lungs    Sleep apnea     COMPLIANT ON CPAP     Thoracic outlet syndrome     bilateral    Thyroid disease        SURGICAL HISTORY    Past Surgical History:   Procedure Laterality Date    BACK SURGERY      lumbar fusion    CARDIAC CATHETERIZATION      CARPAL TUNNEL RELEASE Right     CATARACT REMOVAL WITH IMPLANT Right 08/07/2018    [facility]    CATARACT REMOVAL WITH IMPLANT Left 08/28/2018    [facility]    CHOLECYSTECTOMY      CORONARY ARTERY BYPASS GRAFT  8/6/13    cabg x 2     CYSTOSCOPY  04/10/2018    FIRST RIB REMOVAL      bilateral    HYSTERECTOMY      KNEE CARTILAGE SURGERY Right     AR CYSTOURETHROSCOPY N/A 4/10/2018    CYSTOSCOPY performed by Fabiola Colin MD at [address] [place] [address] Right 8/7/2018    EYE CATARACT EMULSIFICATION IOL IMPLANT Reactions    Albuterol Other (See Comments)     Burning in chest    Biaxin [Clarithromycin]     Ceclor [Cefaclor]     Duricef [Cefadroxil]     Fentanyl     Medrol [Methylprednisolone]     Morphine     Norco [Hydrocodone-Acetaminophen] Hives    Penicillins     Percocet [Oxycodone-Acetaminophen]      Only the     Prednisone Swelling     IV and oral. Makes tongue swell. FAMILY HISTORY    Family History   Problem Relation Age of Onset    Heart Disease Father     Cancer Father         lung    Heart Disease Mother         5 stents placed    Diabetes Mother     Diabetes Sister     Rheum Arthritis Sister        SOCIAL HISTORY    Social History     Social History    Marital status:      Spouse name: N/A    Number of children: N/A    Years of education: N/A     Occupational History    unemployed      Social History Main Topics    Smoking status: Former Smoker     Packs/day: 2.00     Years: 14.00     Types: Cigarettes     Quit date: 4/15/1981    Smokeless tobacco: Never Used    Alcohol use No    Drug use: No    Sexual activity: Not Asked     Other Topics Concern    None     Social History Narrative    None       PHYSICAL EXAM    VITAL SIGNS: BP (!) 152/69   Pulse 60   Temp 98 °F (36.7 °C) (Oral)   Resp 18   Ht 5' 6\" (1.676 m)   Wt (!) 310 lb (140.6 kg)   SpO2 100%   BMI 50.04 kg/m²   Constitutional:  Well developed, well nourished, no acute distress, non-toxic appearance   HENT:  Atraumatic, external ears normal, nose normal, oropharynx moist.  Neck- normal range of motion, no tenderness, supple   Respiratory:  No respiratory distress, normal breath sounds. Cardiovascular:  Normal rate, normal rhythm, no murmurs, no gallops, no rubs   GI:  Soft, nondistended, normal bowel sounds, nontender   Musculoskeletal:  The dorsum of the right hand is ecchymotic and edematous. Patient has full range of motion of the wrist and fingers.  The area is soft and tender to

## 2018-09-19 ENCOUNTER — HOSPITAL ENCOUNTER (EMERGENCY)
Age: 63
Discharge: HOME | End: 2018-09-19
Attending: EMERGENCY MEDICINE
Payer: COMMERCIAL

## 2018-09-19 ENCOUNTER — APPOINTMENT (OUTPATIENT)
Dept: GENERAL RADIOLOGY | Age: 63
End: 2018-09-19
Payer: COMMERCIAL

## 2018-09-19 VITALS
BODY MASS INDEX: 47.09 KG/M2 | TEMPERATURE: 97.9 F | RESPIRATION RATE: 17 BRPM | WEIGHT: 293 LBS | OXYGEN SATURATION: 97 % | HEART RATE: 62 BPM | HEIGHT: 66 IN | DIASTOLIC BLOOD PRESSURE: 52 MMHG | SYSTOLIC BLOOD PRESSURE: 128 MMHG

## 2018-09-19 DIAGNOSIS — R07.9 NONSPECIFIC CHEST PAIN: Primary | ICD-10-CM

## 2018-09-19 LAB
ABSOLUTE EOS #: 0.2 K/UL (ref 0–0.4)
ABSOLUTE IMMATURE GRANULOCYTE: ABNORMAL K/UL (ref 0–0.3)
ABSOLUTE LYMPH #: 2.4 K/UL (ref 1–4.8)
ABSOLUTE MONO #: 0.6 K/UL (ref 0.2–0.8)
ANION GAP SERPL CALCULATED.3IONS-SCNC: 12 MMOL/L (ref 9–17)
BASOPHILS # BLD: 1 % (ref 0–2)
BASOPHILS ABSOLUTE: 0 K/UL (ref 0–0.2)
BNP INTERPRETATION: ABNORMAL
BUN BLDV-MCNC: 16 MG/DL (ref 8–23)
BUN/CREAT BLD: 16 (ref 9–20)
CALCIUM SERPL-MCNC: 9.3 MG/DL (ref 8.6–10.4)
CHLORIDE BLD-SCNC: 100 MMOL/L (ref 98–107)
CO2: 27 MMOL/L (ref 20–31)
CREAT SERPL-MCNC: 0.97 MG/DL (ref 0.5–0.9)
DIFFERENTIAL TYPE: ABNORMAL
EKG ATRIAL RATE: 57 BPM
EKG P AXIS: 55 DEGREES
EKG P-R INTERVAL: 140 MS
EKG Q-T INTERVAL: 444 MS
EKG QRS DURATION: 88 MS
EKG QTC CALCULATION (BAZETT): 432 MS
EKG R AXIS: 16 DEGREES
EKG T AXIS: 30 DEGREES
EKG VENTRICULAR RATE: 57 BPM
EOSINOPHILS RELATIVE PERCENT: 2 % (ref 1–4)
GFR AFRICAN AMERICAN: >60 ML/MIN
GFR NON-AFRICAN AMERICAN: 58 ML/MIN
GFR SERPL CREATININE-BSD FRML MDRD: ABNORMAL ML/MIN/{1.73_M2}
GFR SERPL CREATININE-BSD FRML MDRD: ABNORMAL ML/MIN/{1.73_M2}
GLUCOSE BLD-MCNC: 102 MG/DL (ref 70–99)
HCT VFR BLD CALC: 43.6 % (ref 36–46)
HEMOGLOBIN: 14.2 G/DL (ref 12–16)
IMMATURE GRANULOCYTES: ABNORMAL %
LYMPHOCYTES # BLD: 30 % (ref 24–44)
MAGNESIUM: 1.8 MG/DL (ref 1.6–2.6)
MCH RBC QN AUTO: 29 PG (ref 26–34)
MCHC RBC AUTO-ENTMCNC: 32.6 G/DL (ref 31–37)
MCV RBC AUTO: 89.1 FL (ref 80–100)
MONOCYTES # BLD: 8 % (ref 1–7)
MYOGLOBIN: 29 NG/ML (ref 25–58)
NRBC AUTOMATED: ABNORMAL PER 100 WBC
PDW BLD-RTO: 14.9 % (ref 11.5–14.5)
PLATELET # BLD: 165 K/UL (ref 130–400)
PLATELET ESTIMATE: ABNORMAL
PMV BLD AUTO: 10.7 FL (ref 6–12)
POTASSIUM SERPL-SCNC: 3.9 MMOL/L (ref 3.7–5.3)
PRO-BNP: 365 PG/ML
RBC # BLD: 4.89 M/UL (ref 4–5.2)
RBC # BLD: ABNORMAL 10*6/UL
SEG NEUTROPHILS: 59 % (ref 36–66)
SEGMENTED NEUTROPHILS ABSOLUTE COUNT: 4.8 K/UL (ref 1.8–7.7)
SODIUM BLD-SCNC: 139 MMOL/L (ref 135–144)
TROPONIN INTERP: NORMAL
TROPONIN T: <0.03 NG/ML
WBC # BLD: 8.1 K/UL (ref 3.5–11)
WBC # BLD: ABNORMAL 10*3/UL

## 2018-09-19 PROCEDURE — 83880 ASSAY OF NATRIURETIC PEPTIDE: CPT

## 2018-09-19 PROCEDURE — 93005 ELECTROCARDIOGRAM TRACING: CPT

## 2018-09-19 PROCEDURE — 83874 ASSAY OF MYOGLOBIN: CPT

## 2018-09-19 PROCEDURE — 96375 TX/PRO/DX INJ NEW DRUG ADDON: CPT

## 2018-09-19 PROCEDURE — 2580000003 HC RX 258: Performed by: NURSE PRACTITIONER

## 2018-09-19 PROCEDURE — 99285 EMERGENCY DEPT VISIT HI MDM: CPT

## 2018-09-19 PROCEDURE — 71045 X-RAY EXAM CHEST 1 VIEW: CPT

## 2018-09-19 PROCEDURE — 80048 BASIC METABOLIC PNL TOTAL CA: CPT

## 2018-09-19 PROCEDURE — 85025 COMPLETE CBC W/AUTO DIFF WBC: CPT

## 2018-09-19 PROCEDURE — 96374 THER/PROPH/DIAG INJ IV PUSH: CPT

## 2018-09-19 PROCEDURE — 6360000002 HC RX W HCPCS: Performed by: NURSE PRACTITIONER

## 2018-09-19 PROCEDURE — 84484 ASSAY OF TROPONIN QUANT: CPT

## 2018-09-19 PROCEDURE — 96361 HYDRATE IV INFUSION ADD-ON: CPT

## 2018-09-19 PROCEDURE — 83735 ASSAY OF MAGNESIUM: CPT

## 2018-09-19 PROCEDURE — 6370000000 HC RX 637 (ALT 250 FOR IP): Performed by: NURSE PRACTITIONER

## 2018-09-19 RX ORDER — ONDANSETRON 2 MG/ML
4 INJECTION INTRAMUSCULAR; INTRAVENOUS ONCE
Status: COMPLETED | OUTPATIENT
Start: 2018-09-19 | End: 2018-09-19

## 2018-09-19 RX ORDER — 0.9 % SODIUM CHLORIDE 0.9 %
1000 INTRAVENOUS SOLUTION INTRAVENOUS ONCE
Status: COMPLETED | OUTPATIENT
Start: 2018-09-19 | End: 2018-09-19

## 2018-09-19 RX ORDER — ASPIRIN 81 MG/1
324 TABLET, CHEWABLE ORAL ONCE
Status: COMPLETED | OUTPATIENT
Start: 2018-09-19 | End: 2018-09-19

## 2018-09-19 RX ORDER — BUMETANIDE 1 MG/1
1 TABLET ORAL
COMMUNITY

## 2018-09-19 RX ADMIN — ONDANSETRON 4 MG: 2 INJECTION INTRAMUSCULAR; INTRAVENOUS at 19:07

## 2018-09-19 RX ADMIN — HYDROMORPHONE HYDROCHLORIDE 0.5 MG: 1 INJECTION, SOLUTION INTRAMUSCULAR; INTRAVENOUS; SUBCUTANEOUS at 19:08

## 2018-09-19 RX ADMIN — ASPIRIN 81 MG 324 MG: 81 TABLET ORAL at 19:07

## 2018-09-19 RX ADMIN — SODIUM CHLORIDE 1000 ML: 9 INJECTION, SOLUTION INTRAVENOUS at 19:07

## 2018-09-19 ASSESSMENT — PAIN SCALES - GENERAL
PAINLEVEL_OUTOF10: 5
PAINLEVEL_OUTOF10: 5

## 2018-09-19 ASSESSMENT — ENCOUNTER SYMPTOMS
NAUSEA: 1
COUGH: 0
SHORTNESS OF BREATH: 1

## 2018-09-19 ASSESSMENT — PAIN DESCRIPTION - LOCATION: LOCATION: CHEST

## 2018-09-19 ASSESSMENT — HEART SCORE: ECG: 0

## 2018-09-19 ASSESSMENT — PAIN DESCRIPTION - ORIENTATION: ORIENTATION: LEFT

## 2018-09-19 NOTE — ED NOTES
Pt presents to the er c/o left sided chest pain radiating down her left arm pt is      Bertha Oleary RN  09/19/18 1944

## 2018-09-20 NOTE — PROGRESS NOTES
Spouse name: N/A    Number of children: N/A    Years of education: N/A     Occupational History    unemployed      Social History Main Topics    Smoking status: Former Smoker     Packs/day: 2.00     Years: 14.00     Types: Cigarettes     Quit date: 4/15/1981    Smokeless tobacco: Never Used    Alcohol use No    Drug use: No    Sexual activity: Not on file     Other Topics Concern    Not on file     Social History Narrative    No narrative on file       Vitals:  BP (!) 146/69   Pulse 66   Temp 97.9 °F (36.6 °C)   Resp 15   Ht 5' 6\" (1.676 m)   Wt (!) 310 lb (140.6 kg)   SpO2 96%   BMI 50.04 kg/m²   Temp (24hrs), Av.9 °F (36.6 °C), Min:97.9 °F (36.6 °C), Max:97.9 °F (36.6 °C)    No results for input(s): POCGLU in the last 72 hours. I/O (24Hr): No intake or output data in the 24 hours ending 18    Labs:  Daily Labs for 3 Days:    Hematology:  Recent Labs      18   1850   WBC  8.1   HGB  14.2   HCT  43.6   PLT  165     Chemistry:  Recent Labs      18   1850   NA  139   K  3.9   CL  100   CO2  27   GLUCOSE  102*   BUN  16   CREATININE  0.97*   MG  1.8   CALCIUM  9.3     No results for input(s): PROT, LABALBU, LABA1C, A6MYTDY, T4HRUHE, FT4, TSH, AST, ALT, LDH, GGT, ALKPHOS, BILITOT, BILIDIR, AMMONIA, AMYLASE, LIPASE, LACTATE, CHOL, HDL, LDLCHOLESTEROL, CHOLHDLRATIO, TRIG, VLDL, BNP, TROPONINI, CKTOTAL, CKMB, CKMBINDEX in the last 72 hours.     paraclinics reviewed as posted  Physical Examination:      General appearance: alert, cooperative and no distress  Mental Status: oriented to person, place and time and normal affect  Lungs: clear to auscultation bilaterally, normal effort  Heart: regular rate and rhythm, no murmur,  Abdomen: soft, nontender, nondistended, bowel sounds present all four quadrants, no masses, hepatomegaly or splenomegaly  Extremities: no edema, redness or tenderness in the calves  Skin: no gross lesions, rashes, or induration  ent perrla with eomi conjunctivae pink sclerae clear  Neuro-oriented and alert cn2-12 intact cerebral and cerebellar intact reflexes +2/4 bilaterally  Vascular  Good dp tp carotids  Ortho- no masses or synovitis  Good rom  Lymphatics none palpated in cervical supraclavicular axillary  Or inguinal area    Assessment:        Primary Problem  <principal problem not specified>   bk amputation left leg  dm2  pvd  aso   Copd    There are no active hospital problems to display for this patient. Past Medical History:   Diagnosis Date    Allergic rhinitis     Arthritis     Arthritis     Asthma     CAD (coronary artery disease) 8/6/13    cabg x 2    [address]ine inhalation lung injury ([facility] [address]     Severe irritability and airway reactivity     Chronic back pain     Dyslipidemia     Dyspnea on exertion     Eczema     GERD (gastroesophageal reflux disease)     Headache     MIGRAINES    HTN (hypertension)     MVP (mitral valve prolapse)     Obesity     Poison ivy     States in lungs    Sleep apnea     COMPLIANT ON CPAP     Thoracic outlet syndrome     bilateral    Thyroid disease         Plan:        1.  Cont same plan of care  2.  oot until 1 october      Electronically signed by Ralph Naranjo MD on 9/19/2018 at 8:14 PM

## 2018-09-20 NOTE — ED PROVIDER NOTES
Attending Supervisory Note/Shared Visit   I have personally performed a face to face diagnostic evaluation on this patient. I have reviewed the mid-levels findings and agree.         (Please note that portions of this note were completed with a voice recognition program.  Efforts were made to edit the dictations but occasionally words are mis-transcribed.)    Kole De Los Santos MD  Attending Emergency Physician      Kole De Los Santos MD  09/19/18 [id number]

## 2018-09-23 ENCOUNTER — HOSPITAL ENCOUNTER (EMERGENCY)
Age: 63
Discharge: HOME | End: 2018-09-23
Attending: EMERGENCY MEDICINE
Payer: COMMERCIAL

## 2018-09-23 ENCOUNTER — APPOINTMENT (OUTPATIENT)
Dept: GENERAL RADIOLOGY | Age: 63
End: 2018-09-23
Payer: COMMERCIAL

## 2018-09-23 VITALS
HEIGHT: 66 IN | RESPIRATION RATE: 18 BRPM | WEIGHT: 293 LBS | OXYGEN SATURATION: 98 % | TEMPERATURE: 97.9 F | HEART RATE: 60 BPM | DIASTOLIC BLOOD PRESSURE: 82 MMHG | SYSTOLIC BLOOD PRESSURE: 149 MMHG | BODY MASS INDEX: 47.09 KG/M2

## 2018-09-23 DIAGNOSIS — S63.501A SPRAIN OF RIGHT WRIST, INITIAL ENCOUNTER: Primary | ICD-10-CM

## 2018-09-23 PROCEDURE — 73130 X-RAY EXAM OF HAND: CPT

## 2018-09-23 PROCEDURE — 73110 X-RAY EXAM OF WRIST: CPT

## 2018-09-23 PROCEDURE — 99283 EMERGENCY DEPT VISIT LOW MDM: CPT

## 2018-09-23 RX ORDER — IBUPROFEN 600 MG/1
600 TABLET ORAL EVERY 8 HOURS PRN
Qty: 15 TABLET | Refills: 0 | Status: SHIPPED | OUTPATIENT
Start: 2018-09-23 | End: 2018-12-10

## 2018-09-23 ASSESSMENT — ENCOUNTER SYMPTOMS
ALLERGIC/IMMUNOLOGIC NEGATIVE: 1
RESPIRATORY NEGATIVE: 1
EYES NEGATIVE: 1

## 2018-09-23 ASSESSMENT — PAIN DESCRIPTION - ORIENTATION: ORIENTATION: RIGHT

## 2018-09-23 ASSESSMENT — PAIN DESCRIPTION - LOCATION: LOCATION: WRIST

## 2018-09-23 ASSESSMENT — PAIN DESCRIPTION - PAIN TYPE: TYPE: ACUTE PAIN

## 2018-09-23 ASSESSMENT — PAIN DESCRIPTION - DESCRIPTORS: DESCRIPTORS: ACHING;SHARP;STABBING

## 2018-09-23 ASSESSMENT — PAIN SCALES - GENERAL: PAINLEVEL_OUTOF10: 7

## 2018-09-23 NOTE — ED PROVIDER NOTES
8/7/2018    EYE CATARACT EMULSIFICATION IOL IMPLANT performed by Katina Pickard MD at [facility]. [address] Left 8/28/2018    EYE CATARACT EMULSIFICATION IOL IMPLANT performed by Katina Pickard MD at [facility] Left     TONSILLECTOMY      TOTAL KNEE ARTHROPLASTY Left     TOTAL KNEE ARTHROPLASTY Right 03/2018    ULNAR TUNNEL RELEASE Right     VARICOSE VEIN SURGERY      bilateral         CURRENT MEDICATIONS       Previous Medications    ASPIRIN 81 MG EC TABLET    Take 1 tablet by mouth daily. ATENOLOL (TENORMIN) 25 MG TABLET    Take 12.5 mg by mouth nightly Pt takes at bedtime. BUMETANIDE (BUMEX) 1 MG TABLET    Take 1 mg by mouth daily Every Monday Wednesday and friday    ESCITALOPRAM (LEXAPRO) 10 MG TABLET    Take 10 mg by mouth nightly     ESOMEPRAZOLE (NEXIUM) 24.65 MG CPDR CAPSULE    Take 1 capsule by mouth daily     GABAPENTIN (NEURONTIN) 100 MG CAPSULE    Take 200 mg by mouth 3 times daily. Brendon Aarti LEVOTHYROXINE (SYNTHROID) 25 MCG TABLET    Take 25 mcg by mouth Daily    LORAZEPAM (ATIVAN) 0.5 MG TABLET    Take 0.5 mg by mouth 2 times daily as needed. .    MELOXICAM (MOBIC) 15 MG TABLET    Take 15 mg by mouth daily    MIRABEGRON (MYRBETRIQ PO)    Take by mouth daily    NITROGLYCERIN (NITROSTAT) 0.4 MG SL TABLET    Place 0.4 mg under the tongue every 5 minutes as needed for Chest pain.     PIOGLITAZONE (ACTOS) 15 MG TABLET    Take 15 mg by mouth daily    RANITIDINE (ZANTAC) 150 MG TABLET    Take 150 mg by mouth 2 times daily    SPIRONOLACTONE (ALDACTONE) 25 MG TABLET    Take 25 mg by mouth every other day Indications: Patient alternates 25mg and 50mg of spironolactone Patient alternates 25mg and 50mg of spironolactone    SPIRONOLACTONE (ALDACTONE) 50 MG TABLET    Take 50 mg by mouth every other day Indications: Patient alternates 25mg and 50mg of spironolactone Patient alternates 25mg and 50mg of spironolactone       ALLERGIES     Albuterol; Biaxin 03/12/2012. HISTORY: ORDERING SYSTEM PROVIDED HISTORY: Pain TECHNOLOGIST PROVIDED HISTORY: Pain Ordering Physician Provided Reason for Exam: right wrist pain Acuity: Acute Type of Exam: Initial FINDINGS: Right wrist, four views: No acute fracture or dislocation. Carpal alignment is maintained. Moderately severe degenerative change at the triscaphe joint as well as the 1st carpal/metacarpal joint with some progression compared to the previous study. Mild degenerative narrowing of the radial carpal articulation laterally. No focal soft tissue abnormality. Right hand, three views: No acute fracture or dislocation. No significant arthritic changes are seen. Joint spaces are preserved. No focal soft tissue abnormality. No acute osseous abnormality of the right wrist.  Moderately severe degenerative change at the triscaphe joint and 1st carpal/metacarpal joint. No acute osseous abnormality of the right hand. ED BEDSIDE ULTRASOUND:   Performed by ED Physician - none    LABS:  Labs Reviewed - No data to display    All other labs were within normal range or not returned as of this dictation. EMERGENCY DEPARTMENT COURSE and DIFFERENTIAL DIAGNOSIS/MDM:   Vitals:    Vitals:    09/23/18 1732   BP: (!) 149/82   Pulse: 60   Resp: 18   Temp: 97.9 °F (36.6 °C)   TempSrc: Oral   SpO2: 98%   Weight: (!) 310 lb (140.6 kg)   Height: 5' 6\" (1.676 m)         CONSULTS:  None    PROCEDURES:  velco wrist splint    FINAL IMPRESSION      1.  Sprain of right wrist, initial encounter          DISPOSITION/PLAN   DISPOSITION Decision To Discharge 09/23/2018 06:26:22 PM      PATIENT REFERRED TO:   Sade Howe MD    In 1 week  As needed, this depart      DISCHARGE MEDICATIONS:     New Prescriptions    IBUPROFEN (ADVIL;MOTRIN) 600 MG TABLET    Take 1 tablet by mouth every 8 hours as needed for Pain           (Please note that portions of this note were completed with a voice recognition program.  Efforts were made to edit the

## 2018-10-04 ENCOUNTER — TELEPHONE (OUTPATIENT)
Dept: INFUSION THERAPY | Facility: MEDICAL CENTER | Age: 63
End: 2018-10-04

## 2018-10-04 ENCOUNTER — HOSPITAL ENCOUNTER (OUTPATIENT)
Age: 63
Discharge: HOME | End: 2018-10-04
Payer: COMMERCIAL

## 2018-10-04 DIAGNOSIS — R53.82 CHRONIC FATIGUE: Primary | ICD-10-CM

## 2018-10-04 LAB
CHOLESTEROL, FASTING: 249 MG/DL
CHOLESTEROL/HDL RATIO: 5.2
ESTIMATED AVERAGE GLUCOSE: 128 MG/DL
HBA1C MFR BLD: 6.1 % (ref 4–6)
HDLC SERPL-MCNC: 48 MG/DL
LDL CHOLESTEROL: 124 MG/DL (ref 0–130)
T3 FREE: 3.29 PG/ML (ref 2.02–4.43)
THYROXINE, FREE: 1.13 NG/DL (ref 0.93–1.7)
TRIGLYCERIDE, FASTING: 384 MG/DL
TSH SERPL DL<=0.05 MIU/L-ACNC: 2.57 MIU/L (ref 0.3–5)
VITAMIN D 25-HYDROXY: 27.3 NG/ML (ref 30–100)
VLDLC SERPL CALC-MCNC: ABNORMAL MG/DL (ref 1–30)

## 2018-10-04 PROCEDURE — 80061 LIPID PANEL: CPT

## 2018-10-04 PROCEDURE — 82306 VITAMIN D 25 HYDROXY: CPT

## 2018-10-04 PROCEDURE — 84481 FREE ASSAY (FT-3): CPT

## 2018-10-04 PROCEDURE — 84439 ASSAY OF FREE THYROXINE: CPT

## 2018-10-04 PROCEDURE — 86800 THYROGLOBULIN ANTIBODY: CPT

## 2018-10-04 PROCEDURE — 86376 MICROSOMAL ANTIBODY EACH: CPT

## 2018-10-04 PROCEDURE — 36415 COLL VENOUS BLD VENIPUNCTURE: CPT

## 2018-10-04 PROCEDURE — 83036 HEMOGLOBIN GLYCOSYLATED A1C: CPT

## 2018-10-04 PROCEDURE — 84443 ASSAY THYROID STIM HORMONE: CPT

## 2018-10-05 DIAGNOSIS — R53.82 CHRONIC FATIGUE, UNSPECIFIED: ICD-10-CM

## 2018-10-05 LAB
THYROGLOBULIN AB: <20 IU/ML (ref 0–40)
THYROID PEROXIDASE (TPO) AB: <10 IU/ML (ref 0–35)

## 2018-10-08 ENCOUNTER — HOSPITAL ENCOUNTER (OUTPATIENT)
Facility: MEDICAL CENTER | Age: 63
End: 2018-10-08
Payer: COMMERCIAL

## 2018-10-10 ENCOUNTER — HOSPITAL ENCOUNTER (OUTPATIENT)
Dept: INFUSION THERAPY | Facility: MEDICAL CENTER | Age: 63
Discharge: HOME | End: 2018-10-10
Payer: COMMERCIAL

## 2018-10-10 VITALS
SYSTOLIC BLOOD PRESSURE: 130 MMHG | HEART RATE: 56 BPM | RESPIRATION RATE: 12 BRPM | TEMPERATURE: 98.2 F | DIASTOLIC BLOOD PRESSURE: 69 MMHG

## 2018-10-10 DIAGNOSIS — R53.82 CHRONIC FATIGUE, UNSPECIFIED: ICD-10-CM

## 2018-10-10 LAB
CORTISOL COLLECTION INFO: 30
CORTISOL COLLECTION INFO: 60
CORTISOL: 11.4 UG/DL (ref 2.7–18.4)
CORTISOL: 13.4 UG/DL (ref 2.7–18.4)

## 2018-10-10 PROCEDURE — 36415 COLL VENOUS BLD VENIPUNCTURE: CPT

## 2018-10-10 PROCEDURE — 2580000003 HC RX 258: Performed by: INTERNAL MEDICINE

## 2018-10-10 PROCEDURE — 96375 TX/PRO/DX INJ NEW DRUG ADDON: CPT

## 2018-10-10 PROCEDURE — 96374 THER/PROPH/DIAG INJ IV PUSH: CPT

## 2018-10-10 PROCEDURE — 6360000002 HC RX W HCPCS: Performed by: INTERNAL MEDICINE

## 2018-10-10 PROCEDURE — 82533 TOTAL CORTISOL: CPT

## 2018-10-10 RX ADMIN — COSYNTROPIN 1 MCG: 0.25 INJECTION, POWDER, LYOPHILIZED, FOR SOLUTION INTRAMUSCULAR; INTRAVENOUS at 09:01

## 2018-10-10 NOTE — FLOWSHEET NOTE
Assessment:  encountered Pt while rounding the unit. Pt introduced herself as [place]. \" Pt shared reason for being here, to rule out a condition that her aunt had. Pt denied being concerned about the results, sharing how she went through open heart surgery five years ago. Pt shared story of her experience and credited her survival to [employer]. \" Pt affirmed that she relies on her sam and that \"the Lord\" has been with her from a young age. Pt shared that she and her  attend a [employer] [facility] [facility] in [place], [place]. Pt was receptive to prayer. Following prayer,  inquired about Pt's \"allies. \" Pt shared that she lives with her  and two children. Pt told how she lost a son in 2012 and how Ana Paula Marrero is with me every day\" and how she has always been drawn to children. Pt described her close relationship with children, from a young age, and her current close relationship with her grandchildren and other children. Pt shared that she learned from her aunt/uncle and grandmother strong values, including the importance of treating everyone \"like family. \" Pt named this as a guiding value. Pt shared examples of how she nurtures relationships with people in her life and how they respond to her. Pt talked about her plans to help her family after her treatment today. Intervention:   provided supportive presence, affirmation, exploration of coping, needs, resources, facilitation of story telling and prayer. Outcome:  Patient recounted ruiz events from her life that have shaped the person she is. Pt reflected on the people who have influenced her. Pt verbalized her philosophy and approach to life. Pt expressed gratitude for 's prayer. Pt declined reading resources.  is available for follow up support if needed. 10/10/18 9785   Encounter Summary   Services provided to: Patient   Referral/Consult From: [address] [place] [address] Family members; Children;Spouse; Muslim/sam [facility]   Place of [address] [place] [address] BELEN Mejia [address] [place] [address]   Continue Visiting (10/10/18)   Complexity of Encounter Low   Length of Encounter 15 minutes   Spiritual/Orthodoxy   Type Spiritual support   Assessment Calm; Approachable; Hopeful   Intervention Active listening;Explored feelings, thoughts, concerns;Explored coping resources;Prayer;Nurtured hope;Sustaining presence/ Ministry of presence; Discussed belief system/Anglican practices/sam;Discussed relationship with God;Discussed meaning/purpose   Outcome Expressed gratitude; Shared life review;Engaged in conversation;Encouraged; Hopeful;Receptive       Electronically signed by Sen Mena, Oncology Outpatient [facility], [address] [facility] Radiation Oncology  10/10/2018  9:48 AM

## 2018-10-10 NOTE — PROGRESS NOTES
Konstantinjim Alexander arrives ambulatory alone  To have cortisol testing done  Order reviewed  Iv started with #22 cathlon to rt antecubital area per policy   Good blood return noted  9;00 am cortisol 1 mcg given ivp  930 cortisol level drawn per lab  1000 am cortisol level drawn per lab  Iv line discontinued with needle intact  Pressure dressing applied  Discharged per ambulatory

## 2018-11-20 ENCOUNTER — HOSPITAL ENCOUNTER (OUTPATIENT)
Dept: NON INVASIVE DIAGNOSTICS | Age: 63
Discharge: HOME | End: 2018-11-20
Payer: COMMERCIAL

## 2018-11-20 ENCOUNTER — HOSPITAL ENCOUNTER (OUTPATIENT)
Age: 63
Discharge: HOME | End: 2018-11-20
Payer: COMMERCIAL

## 2018-11-20 LAB
ALBUMIN SERPL-MCNC: 4.1 G/DL (ref 3.5–5.2)
ALT SERPL-CCNC: 31 U/L (ref 5–33)
AST SERPL-CCNC: 25 U/L
INR BLD: 1
LV EF: 60 %
LVEF MODALITY: NORMAL
PREALBUMIN: 19.1 MG/DL (ref 20–40)
PROTHROMBIN TIME: 9.9 SEC (ref 9.7–11.6)
TOTAL PROTEIN: 7.4 G/DL (ref 6.4–8.3)

## 2018-11-20 PROCEDURE — 84134 ASSAY OF PREALBUMIN: CPT

## 2018-11-20 PROCEDURE — 82040 ASSAY OF SERUM ALBUMIN: CPT

## 2018-11-20 PROCEDURE — 36415 COLL VENOUS BLD VENIPUNCTURE: CPT

## 2018-11-20 PROCEDURE — 84460 ALANINE AMINO (ALT) (SGPT): CPT

## 2018-11-20 PROCEDURE — 84450 TRANSFERASE (AST) (SGOT): CPT

## 2018-11-20 PROCEDURE — 93306 TTE W/DOPPLER COMPLETE: CPT

## 2018-11-20 PROCEDURE — 84155 ASSAY OF PROTEIN SERUM: CPT

## 2018-11-20 PROCEDURE — 85610 PROTHROMBIN TIME: CPT

## 2018-12-10 ENCOUNTER — APPOINTMENT (OUTPATIENT)
Dept: CT IMAGING | Age: 63
End: 2018-12-10
Payer: COMMERCIAL

## 2018-12-10 ENCOUNTER — HOSPITAL ENCOUNTER (EMERGENCY)
Age: 63
Discharge: HOME | End: 2018-12-10
Attending: EMERGENCY MEDICINE
Payer: COMMERCIAL

## 2018-12-10 VITALS
RESPIRATION RATE: 16 BRPM | SYSTOLIC BLOOD PRESSURE: 149 MMHG | HEART RATE: 61 BPM | BODY MASS INDEX: 47.09 KG/M2 | HEIGHT: 66 IN | DIASTOLIC BLOOD PRESSURE: 66 MMHG | TEMPERATURE: 97.5 F | OXYGEN SATURATION: 97 % | WEIGHT: 293 LBS

## 2018-12-10 DIAGNOSIS — R51.9 NONINTRACTABLE HEADACHE, UNSPECIFIED CHRONICITY PATTERN, UNSPECIFIED HEADACHE TYPE: Primary | ICD-10-CM

## 2018-12-10 LAB
ABSOLUTE EOS #: 0.3 K/UL (ref 0–0.4)
ABSOLUTE IMMATURE GRANULOCYTE: ABNORMAL K/UL (ref 0–0.3)
ABSOLUTE LYMPH #: 2.1 K/UL (ref 1–4.8)
ABSOLUTE MONO #: 0.5 K/UL (ref 0.2–0.8)
ALBUMIN SERPL-MCNC: 4.1 G/DL (ref 3.5–5.2)
ALBUMIN/GLOBULIN RATIO: ABNORMAL (ref 1–2.5)
ALP BLD-CCNC: 50 U/L (ref 35–104)
ALT SERPL-CCNC: 24 U/L (ref 5–33)
ANION GAP SERPL CALCULATED.3IONS-SCNC: 13 MMOL/L (ref 9–17)
AST SERPL-CCNC: 25 U/L
BASOPHILS # BLD: 1 % (ref 0–2)
BASOPHILS ABSOLUTE: 0 K/UL (ref 0–0.2)
BILIRUB SERPL-MCNC: 0.49 MG/DL (ref 0.3–1.2)
BUN BLDV-MCNC: 17 MG/DL (ref 8–23)
BUN/CREAT BLD: 20 (ref 9–20)
CALCIUM SERPL-MCNC: 9.7 MG/DL (ref 8.6–10.4)
CHLORIDE BLD-SCNC: 104 MMOL/L (ref 98–107)
CO2: 24 MMOL/L (ref 20–31)
CREAT SERPL-MCNC: 0.85 MG/DL (ref 0.5–0.9)
DIFFERENTIAL TYPE: ABNORMAL
EKG ATRIAL RATE: 64 BPM
EKG P AXIS: 21 DEGREES
EKG P-R INTERVAL: 152 MS
EKG Q-T INTERVAL: 416 MS
EKG QRS DURATION: 88 MS
EKG QTC CALCULATION (BAZETT): 429 MS
EKG R AXIS: 11 DEGREES
EKG T AXIS: 23 DEGREES
EKG VENTRICULAR RATE: 64 BPM
EOSINOPHILS RELATIVE PERCENT: 4 % (ref 1–4)
GFR AFRICAN AMERICAN: >60 ML/MIN
GFR NON-AFRICAN AMERICAN: >60 ML/MIN
GFR SERPL CREATININE-BSD FRML MDRD: ABNORMAL ML/MIN/{1.73_M2}
GFR SERPL CREATININE-BSD FRML MDRD: ABNORMAL ML/MIN/{1.73_M2}
GLUCOSE BLD-MCNC: 108 MG/DL (ref 70–99)
GLUCOSE BLD-MCNC: 175 MG/DL (ref 65–105)
HCT VFR BLD CALC: 43.1 % (ref 36–46)
HEMOGLOBIN: 15 G/DL (ref 12–16)
IMMATURE GRANULOCYTES: ABNORMAL %
INR BLD: 1
LYMPHOCYTES # BLD: 28 % (ref 24–44)
MCH RBC QN AUTO: 30.4 PG (ref 26–34)
MCHC RBC AUTO-ENTMCNC: 34.7 G/DL (ref 31–37)
MCV RBC AUTO: 87.4 FL (ref 80–100)
MONOCYTES # BLD: 7 % (ref 1–7)
NRBC AUTOMATED: ABNORMAL PER 100 WBC
PARTIAL THROMBOPLASTIN TIME: 26.1 SEC (ref 23–31)
PDW BLD-RTO: 14.8 % (ref 11.5–14.5)
PLATELET # BLD: 164 K/UL (ref 130–400)
PLATELET ESTIMATE: ABNORMAL
PMV BLD AUTO: 10.1 FL (ref 6–12)
POTASSIUM SERPL-SCNC: 4.3 MMOL/L (ref 3.7–5.3)
PROTHROMBIN TIME: 10 SEC (ref 9.7–11.6)
RBC # BLD: 4.94 M/UL (ref 4–5.2)
RBC # BLD: ABNORMAL 10*6/UL
SEG NEUTROPHILS: 60 % (ref 36–66)
SEGMENTED NEUTROPHILS ABSOLUTE COUNT: 4.8 K/UL (ref 1.8–7.7)
SODIUM BLD-SCNC: 141 MMOL/L (ref 135–144)
TOTAL PROTEIN: 7.6 G/DL (ref 6.4–8.3)
TROPONIN INTERP: NORMAL
TROPONIN T: <0.03 NG/ML
WBC # BLD: 7.8 K/UL (ref 3.5–11)
WBC # BLD: ABNORMAL 10*3/UL

## 2018-12-10 PROCEDURE — 99284 EMERGENCY DEPT VISIT MOD MDM: CPT

## 2018-12-10 PROCEDURE — 6370000000 HC RX 637 (ALT 250 FOR IP): Performed by: NURSE PRACTITIONER

## 2018-12-10 PROCEDURE — 82947 ASSAY GLUCOSE BLOOD QUANT: CPT

## 2018-12-10 PROCEDURE — 84484 ASSAY OF TROPONIN QUANT: CPT

## 2018-12-10 PROCEDURE — 70450 CT HEAD/BRAIN W/O DYE: CPT

## 2018-12-10 PROCEDURE — 85730 THROMBOPLASTIN TIME PARTIAL: CPT

## 2018-12-10 PROCEDURE — 70496 CT ANGIOGRAPHY HEAD: CPT

## 2018-12-10 PROCEDURE — 85025 COMPLETE CBC W/AUTO DIFF WBC: CPT

## 2018-12-10 PROCEDURE — 96374 THER/PROPH/DIAG INJ IV PUSH: CPT

## 2018-12-10 PROCEDURE — 70498 CT ANGIOGRAPHY NECK: CPT

## 2018-12-10 PROCEDURE — 93005 ELECTROCARDIOGRAM TRACING: CPT

## 2018-12-10 PROCEDURE — 99283 EMERGENCY DEPT VISIT LOW MDM: CPT | Performed by: PSYCHIATRY & NEUROLOGY

## 2018-12-10 PROCEDURE — 6360000004 HC RX CONTRAST MEDICATION: Performed by: NURSE PRACTITIONER

## 2018-12-10 PROCEDURE — 85610 PROTHROMBIN TIME: CPT

## 2018-12-10 PROCEDURE — 96375 TX/PRO/DX INJ NEW DRUG ADDON: CPT

## 2018-12-10 PROCEDURE — 2580000003 HC RX 258: Performed by: NURSE PRACTITIONER

## 2018-12-10 PROCEDURE — 80053 COMPREHEN METABOLIC PANEL: CPT

## 2018-12-10 PROCEDURE — 6360000002 HC RX W HCPCS: Performed by: NURSE PRACTITIONER

## 2018-12-10 RX ORDER — ASPIRIN 81 MG/1
324 TABLET, CHEWABLE ORAL ONCE
Status: COMPLETED | OUTPATIENT
Start: 2018-12-10 | End: 2018-12-10

## 2018-12-10 RX ORDER — IBUPROFEN 200 MG
600 TABLET ORAL 3 TIMES DAILY
Status: ON HOLD | COMMUNITY
End: 2019-11-22

## 2018-12-10 RX ORDER — SODIUM CHLORIDE 0.9 % (FLUSH) 0.9 %
10 SYRINGE (ML) INJECTION
Status: COMPLETED | OUTPATIENT
Start: 2018-12-10 | End: 2018-12-10

## 2018-12-10 RX ORDER — 0.9 % SODIUM CHLORIDE 0.9 %
80 INTRAVENOUS SOLUTION INTRAVENOUS ONCE
Status: COMPLETED | OUTPATIENT
Start: 2018-12-10 | End: 2018-12-10

## 2018-12-10 RX ORDER — ACETAMINOPHEN 500 MG
500 TABLET ORAL ONCE
Status: DISCONTINUED | OUTPATIENT
Start: 2018-12-10 | End: 2018-12-10 | Stop reason: HOSPADM

## 2018-12-10 RX ORDER — DIPHENHYDRAMINE HYDROCHLORIDE 50 MG/ML
25 INJECTION INTRAMUSCULAR; INTRAVENOUS ONCE
Status: COMPLETED | OUTPATIENT
Start: 2018-12-10 | End: 2018-12-10

## 2018-12-10 RX ORDER — HYDROCORTISONE 10 MG/1
5 TABLET ORAL EVERY EVENING
COMMUNITY
End: 2019-01-14

## 2018-12-10 RX ORDER — 0.9 % SODIUM CHLORIDE 0.9 %
1000 INTRAVENOUS SOLUTION INTRAVENOUS ONCE
Status: COMPLETED | OUTPATIENT
Start: 2018-12-10 | End: 2018-12-10

## 2018-12-10 RX ORDER — HYDROCORTISONE 10 MG/1
10 TABLET ORAL EVERY MORNING
COMMUNITY
End: 2019-01-14

## 2018-12-10 RX ORDER — METOCLOPRAMIDE HYDROCHLORIDE 5 MG/ML
10 INJECTION INTRAMUSCULAR; INTRAVENOUS ONCE
Status: COMPLETED | OUTPATIENT
Start: 2018-12-10 | End: 2018-12-10

## 2018-12-10 RX ORDER — ROSUVASTATIN CALCIUM 5 MG/1
5 TABLET, COATED ORAL DAILY
COMMUNITY
End: 2020-07-14

## 2018-12-10 RX ADMIN — Medication 10 ML: at 18:40

## 2018-12-10 RX ADMIN — DIPHENHYDRAMINE HYDROCHLORIDE 25 MG: 50 INJECTION, SOLUTION INTRAMUSCULAR; INTRAVENOUS at 18:53

## 2018-12-10 RX ADMIN — ASPIRIN 81 MG 324 MG: 81 TABLET ORAL at 17:08

## 2018-12-10 RX ADMIN — IOPAMIDOL 75 ML: 755 INJECTION, SOLUTION INTRAVENOUS at 18:39

## 2018-12-10 RX ADMIN — SODIUM CHLORIDE 1000 ML: 9 INJECTION, SOLUTION INTRAVENOUS at 18:24

## 2018-12-10 RX ADMIN — SODIUM CHLORIDE 80 ML: 9 INJECTION, SOLUTION INTRAVENOUS at 18:40

## 2018-12-10 RX ADMIN — METOCLOPRAMIDE 10 MG: 5 INJECTION, SOLUTION INTRAMUSCULAR; INTRAVENOUS at 18:53

## 2018-12-10 ASSESSMENT — PAIN SCALES - GENERAL: PAINLEVEL_OUTOF10: 8

## 2018-12-12 ENCOUNTER — HOSPITAL ENCOUNTER (OUTPATIENT)
Dept: NON INVASIVE DIAGNOSTICS | Age: 63
Discharge: HOME | End: 2018-12-12
Payer: COMMERCIAL

## 2018-12-12 ENCOUNTER — HOSPITAL ENCOUNTER (OUTPATIENT)
Dept: NUCLEAR MEDICINE | Age: 63
Discharge: HOME | End: 2018-12-14
Payer: COMMERCIAL

## 2018-12-12 VITALS — DIASTOLIC BLOOD PRESSURE: 55 MMHG | HEART RATE: 58 BPM | RESPIRATION RATE: 15 BRPM | SYSTOLIC BLOOD PRESSURE: 125 MMHG

## 2018-12-12 PROCEDURE — A9500 TC99M SESTAMIBI: HCPCS | Performed by: INTERNAL MEDICINE

## 2018-12-12 PROCEDURE — 93017 CV STRESS TEST TRACING ONLY: CPT

## 2018-12-12 PROCEDURE — 78452 HT MUSCLE IMAGE SPECT MULT: CPT

## 2018-12-12 PROCEDURE — 3430000000 HC RX DIAGNOSTIC RADIOPHARMACEUTICAL: Performed by: INTERNAL MEDICINE

## 2018-12-12 PROCEDURE — 6360000002 HC RX W HCPCS: Performed by: INTERNAL MEDICINE

## 2018-12-12 RX ORDER — AMINOPHYLLINE DIHYDRATE 25 MG/ML
100 INJECTION, SOLUTION INTRAVENOUS
Status: ACTIVE | OUTPATIENT
Start: 2018-12-12 | End: 2018-12-12

## 2018-12-12 RX ORDER — NITROGLYCERIN 0.4 MG/1
0.4 TABLET SUBLINGUAL EVERY 5 MIN PRN
Status: DISCONTINUED | OUTPATIENT
Start: 2018-12-12 | End: 2018-12-13 | Stop reason: HOSPADM

## 2018-12-12 RX ORDER — 0.9 % SODIUM CHLORIDE 0.9 %
250 INTRAVENOUS SOLUTION INTRAVENOUS ONCE
Status: DISCONTINUED | OUTPATIENT
Start: 2018-12-12 | End: 2018-12-13 | Stop reason: HOSPADM

## 2018-12-12 RX ORDER — METOPROLOL TARTRATE 5 MG/5ML
2.5 INJECTION INTRAVENOUS PRN
Status: DISCONTINUED | OUTPATIENT
Start: 2018-12-12 | End: 2018-12-13 | Stop reason: HOSPADM

## 2018-12-12 RX ORDER — SODIUM CHLORIDE 0.9 % (FLUSH) 0.9 %
10 SYRINGE (ML) INJECTION PRN
Status: DISCONTINUED | OUTPATIENT
Start: 2018-12-12 | End: 2018-12-13 | Stop reason: HOSPADM

## 2018-12-12 RX ADMIN — TETRAKIS(2-METHOXYISOBUTYLISOCYANIDE)COPPER(I) TETRAFLUOROBORATE 33.3 MILLICURIE: 1 INJECTION, POWDER, LYOPHILIZED, FOR SOLUTION INTRAVENOUS at 08:11

## 2018-12-12 RX ADMIN — REGADENOSON 0.4 MG: 0.08 INJECTION, SOLUTION INTRAVENOUS at 08:09

## 2018-12-13 LAB
LV EF: 64 %
LVEF MODALITY: NORMAL

## 2018-12-13 PROCEDURE — 3430000000 HC RX DIAGNOSTIC RADIOPHARMACEUTICAL: Performed by: INTERNAL MEDICINE

## 2018-12-13 PROCEDURE — A9500 TC99M SESTAMIBI: HCPCS | Performed by: INTERNAL MEDICINE

## 2018-12-13 RX ADMIN — TETRAKIS(2-METHOXYISOBUTYLISOCYANIDE)COPPER(I) TETRAFLUOROBORATE 34.8 MILLICURIE: 1 INJECTION, POWDER, LYOPHILIZED, FOR SOLUTION INTRAVENOUS at 07:03

## 2019-01-14 ENCOUNTER — APPOINTMENT (OUTPATIENT)
Dept: GENERAL RADIOLOGY | Age: 64
End: 2019-01-14
Payer: COMMERCIAL

## 2019-01-14 ENCOUNTER — HOSPITAL ENCOUNTER (EMERGENCY)
Age: 64
Discharge: HOME | End: 2019-01-15
Attending: EMERGENCY MEDICINE
Payer: COMMERCIAL

## 2019-01-14 VITALS
BODY MASS INDEX: 47.09 KG/M2 | HEART RATE: 62 BPM | WEIGHT: 293 LBS | DIASTOLIC BLOOD PRESSURE: 64 MMHG | RESPIRATION RATE: 16 BRPM | OXYGEN SATURATION: 99 % | SYSTOLIC BLOOD PRESSURE: 185 MMHG | TEMPERATURE: 97.6 F | HEIGHT: 66 IN

## 2019-01-14 DIAGNOSIS — R79.89 D-DIMER, ELEVATED: ICD-10-CM

## 2019-01-14 DIAGNOSIS — M79.602 LEFT ARM PAIN: Primary | ICD-10-CM

## 2019-01-14 LAB — D-DIMER QUANTITATIVE: 0.91 MG/L FEU

## 2019-01-14 PROCEDURE — 73080 X-RAY EXAM OF ELBOW: CPT

## 2019-01-14 PROCEDURE — 99284 EMERGENCY DEPT VISIT MOD MDM: CPT

## 2019-01-14 PROCEDURE — 36415 COLL VENOUS BLD VENIPUNCTURE: CPT

## 2019-01-14 PROCEDURE — 85379 FIBRIN DEGRADATION QUANT: CPT

## 2019-01-14 ASSESSMENT — PAIN SCALES - GENERAL: PAINLEVEL_OUTOF10: 5

## 2019-01-15 ENCOUNTER — HOSPITAL ENCOUNTER (OUTPATIENT)
Dept: VASCULAR LAB | Age: 64
Discharge: HOME | End: 2019-01-15
Payer: COMMERCIAL

## 2019-01-15 PROCEDURE — 6360000002 HC RX W HCPCS: Performed by: NURSE PRACTITIONER

## 2019-01-15 PROCEDURE — 96372 THER/PROPH/DIAG INJ SC/IM: CPT

## 2019-01-15 PROCEDURE — 93971 EXTREMITY STUDY: CPT

## 2019-01-15 RX ADMIN — ENOXAPARIN SODIUM 150 MG: 150 INJECTION SUBCUTANEOUS at 00:06

## 2019-01-17 ENCOUNTER — HOSPITAL ENCOUNTER (OUTPATIENT)
Age: 64
Discharge: HOME | End: 2019-01-17
Payer: COMMERCIAL

## 2019-01-17 LAB
ALBUMIN SERPL-MCNC: 4.1 G/DL (ref 3.5–5.2)
ALBUMIN/GLOBULIN RATIO: 1.3 (ref 1–2.5)
ALP BLD-CCNC: 45 U/L (ref 35–104)
ALT SERPL-CCNC: 30 U/L (ref 5–33)
AST SERPL-CCNC: 24 U/L
BILIRUB SERPL-MCNC: 0.45 MG/DL (ref 0.3–1.2)
BILIRUBIN DIRECT: 0.1 MG/DL
BILIRUBIN, INDIRECT: 0.35 MG/DL (ref 0–1)
CALCIUM SERPL-MCNC: 9.3 MG/DL (ref 8.6–10.4)
GLOBULIN: NORMAL G/DL (ref 1.5–3.8)
INR BLD: 1
POTASSIUM SERPL-SCNC: 4.8 MMOL/L (ref 3.7–5.3)
PROTHROMBIN TIME: 10.2 SEC (ref 9.7–11.6)
TOTAL PROTEIN: 7.3 G/DL (ref 6.4–8.3)
VITAMIN D 25-HYDROXY: 39.8 NG/ML (ref 30–100)

## 2019-01-17 PROCEDURE — 82310 ASSAY OF CALCIUM: CPT

## 2019-01-17 PROCEDURE — 82306 VITAMIN D 25 HYDROXY: CPT

## 2019-01-17 PROCEDURE — 80076 HEPATIC FUNCTION PANEL: CPT

## 2019-01-17 PROCEDURE — 84132 ASSAY OF SERUM POTASSIUM: CPT

## 2019-01-17 PROCEDURE — 85610 PROTHROMBIN TIME: CPT

## 2019-01-17 PROCEDURE — 36415 COLL VENOUS BLD VENIPUNCTURE: CPT

## 2019-01-17 PROCEDURE — 82024 ASSAY OF ACTH: CPT

## 2019-01-18 LAB — ADRENOCORTICOTROPIC HORMONE: 31 PG/ML (ref 6–58)

## 2019-02-27 ENCOUNTER — OFFICE VISIT (OUTPATIENT)
Dept: PODIATRY | Age: 64
End: 2019-02-27
Payer: COMMERCIAL

## 2019-02-27 VITALS — BODY MASS INDEX: 43.4 KG/M2 | RESPIRATION RATE: 16 BRPM | WEIGHT: 293 LBS | HEIGHT: 69 IN

## 2019-02-27 DIAGNOSIS — R26.2 DIFFICULTY WALKING: ICD-10-CM

## 2019-02-27 DIAGNOSIS — M77.52 BURSITIS OF LEFT FOOT: ICD-10-CM

## 2019-02-27 DIAGNOSIS — M79.605 PAIN OF LEFT LOWER EXTREMITY: Primary | ICD-10-CM

## 2019-02-27 DIAGNOSIS — S93.602A FOOT SPRAIN, LEFT, INITIAL ENCOUNTER: ICD-10-CM

## 2019-02-27 PROCEDURE — 20600 DRAIN/INJ JOINT/BURSA W/O US: CPT | Performed by: PODIATRIST

## 2019-02-27 PROCEDURE — G8417 CALC BMI ABV UP PARAM F/U: HCPCS | Performed by: PODIATRIST

## 2019-02-27 PROCEDURE — 3017F COLORECTAL CA SCREEN DOC REV: CPT | Performed by: PODIATRIST

## 2019-02-27 PROCEDURE — G8484 FLU IMMUNIZE NO ADMIN: HCPCS | Performed by: PODIATRIST

## 2019-02-27 PROCEDURE — G8427 DOCREV CUR MEDS BY ELIG CLIN: HCPCS | Performed by: PODIATRIST

## 2019-02-27 PROCEDURE — 1036F TOBACCO NON-USER: CPT | Performed by: PODIATRIST

## 2019-02-27 PROCEDURE — G8598 ASA/ANTIPLAT THER USED: HCPCS | Performed by: PODIATRIST

## 2019-02-27 PROCEDURE — 99203 OFFICE O/P NEW LOW 30 MIN: CPT | Performed by: PODIATRIST

## 2019-03-02 ENCOUNTER — HOSPITAL ENCOUNTER (EMERGENCY)
Age: 64
Discharge: HOME | End: 2019-03-02
Attending: EMERGENCY MEDICINE
Payer: COMMERCIAL

## 2019-03-02 ENCOUNTER — APPOINTMENT (OUTPATIENT)
Dept: CT IMAGING | Age: 64
End: 2019-03-02
Payer: COMMERCIAL

## 2019-03-02 VITALS
DIASTOLIC BLOOD PRESSURE: 58 MMHG | HEIGHT: 66 IN | HEART RATE: 60 BPM | BODY MASS INDEX: 47.09 KG/M2 | TEMPERATURE: 97.9 F | RESPIRATION RATE: 18 BRPM | OXYGEN SATURATION: 98 % | WEIGHT: 293 LBS | SYSTOLIC BLOOD PRESSURE: 134 MMHG

## 2019-03-02 DIAGNOSIS — S76.011A STRAIN OF RIGHT HIP, INITIAL ENCOUNTER: Primary | ICD-10-CM

## 2019-03-02 DIAGNOSIS — S39.012A STRAIN OF LUMBAR REGION, INITIAL ENCOUNTER: ICD-10-CM

## 2019-03-02 PROCEDURE — 6370000000 HC RX 637 (ALT 250 FOR IP): Performed by: NURSE PRACTITIONER

## 2019-03-02 PROCEDURE — 72192 CT PELVIS W/O DYE: CPT

## 2019-03-02 PROCEDURE — 99283 EMERGENCY DEPT VISIT LOW MDM: CPT

## 2019-03-02 PROCEDURE — 72131 CT LUMBAR SPINE W/O DYE: CPT

## 2019-03-02 RX ORDER — METHOCARBAMOL 750 MG/1
750 TABLET, FILM COATED ORAL 3 TIMES DAILY
Qty: 16 TABLET | Refills: 0 | Status: SHIPPED | OUTPATIENT
Start: 2019-03-02 | End: 2019-08-15

## 2019-03-02 RX ORDER — METOCLOPRAMIDE 10 MG/1
10 TABLET ORAL 3 TIMES DAILY
COMMUNITY
End: 2019-08-15

## 2019-03-02 RX ORDER — TRAMADOL HYDROCHLORIDE 50 MG/1
50 TABLET ORAL ONCE
Status: COMPLETED | OUTPATIENT
Start: 2019-03-02 | End: 2019-03-02

## 2019-03-02 RX ORDER — ACETAMINOPHEN AND CODEINE PHOSPHATE 300; 30 MG/1; MG/1
1 TABLET ORAL EVERY 8 HOURS PRN
Qty: 10 TABLET | Refills: 0 | Status: SHIPPED | OUTPATIENT
Start: 2019-03-02 | End: 2019-03-09

## 2019-03-02 RX ADMIN — TRAMADOL HYDROCHLORIDE 50 MG: 50 TABLET, FILM COATED ORAL at 20:39

## 2019-03-02 ASSESSMENT — ENCOUNTER SYMPTOMS
COLOR CHANGE: 0
BACK PAIN: 1

## 2019-03-02 ASSESSMENT — PAIN DESCRIPTION - DESCRIPTORS: DESCRIPTORS: SHARP;STABBING

## 2019-03-02 ASSESSMENT — PAIN DESCRIPTION - ORIENTATION: ORIENTATION: RIGHT;LOWER

## 2019-03-02 ASSESSMENT — PAIN DESCRIPTION - LOCATION: LOCATION: HIP;BACK

## 2019-03-02 ASSESSMENT — PAIN SCALES - GENERAL: PAINLEVEL_OUTOF10: 10

## 2019-03-17 NOTE — ED PROVIDER NOTES
CATARACT REMOVAL WITH IMPLANT Left 08/28/2018    [facility]    CHOLECYSTECTOMY      CORONARY ARTERY BYPASS GRAFT  8/6/13    cabg x 2     CYSTOSCOPY  04/10/2018    FIRST RIB REMOVAL      bilateral    HYSTERECTOMY      KNEE CARTILAGE SURGERY Right     [place] CYSTOURETHROSCOPY N/A 4/10/2018    CYSTOSCOPY performed by Mike Sharma MD at [address] [place] [address] Right 8/7/2018    EYE CATARACT EMULSIFICATION IOL IMPLANT performed by Ag Sorensen MD at [address] [place] [address] Left 8/28/2018    EYE CATARACT EMULSIFICATION IOL IMPLANT performed by Ag Sorensen MD at [place] [address] Left     TONSILLECTOMY      TOTAL KNEE ARTHROPLASTY Left     TOTAL KNEE ARTHROPLASTY Right 03/2018    ULNAR TUNNEL RELEASE Right     VARICOSE VEIN SURGERY      bilateral         CURRENT MEDICATIONS       Discharge Medication List as of 9/19/2018  9:07 PM      CONTINUE these medications which have NOT CHANGED    Details   bumetanide (BUMEX) 1 MG tablet Take 1 mg by mouth daily Every Monday Wednesday and fridayHistorical Med      ranitidine (ZANTAC) 150 MG tablet Take 150 mg by mouth 2 times dailyHistorical Med      Mirabegron (MYRBETRIQ PO) Take by mouth dailyHistorical Med      pioglitazone (ACTOS) 15 MG tablet Take 15 mg by mouth dailyHistorical Med      gabapentin (NEURONTIN) 100 MG capsule Take 200 mg by mouth 3 times daily. [place] Historical Med      esomeprazole (NEXIUM) 24.65 MG CPDR capsule Take 1 capsule by mouth daily Historical Med      !! spironolactone (ALDACTONE) 50 MG tablet Take 50 mg by mouth every other day Indications: Patient alternates 25mg and 50mg of spironolactone Patient alternates 25mg and 50mg of spironolactoneHistorical Med      levothyroxine (SYNTHROID) 25 MCG tablet Take 25 mcg by mouth DailyHistorical Med      meloxicam (MOBIC) 15 MG tablet Take 15 mg by mouth dailyHistorical Med      escitalopram (LEXAPRO) 10 MG tablet Take 10 mg by mouth nightly       nitroGLYCERIN (NITROSTAT) 0.4 MG SL tablet Place 0.4 mg under the tongue every 5 minutes as needed for Chest pain. LORazepam (ATIVAN) 0.5 MG tablet Take 0.5 mg by mouth 2 times daily as needed. [place] Historical Med      aspirin 81 MG EC tablet Take 1 tablet by mouth daily. , Disp-30 tablet, R-1      !! spironolactone (ALDACTONE) 25 MG tablet Take 25 mg by mouth every other day Indications: Patient alternates 25mg and 50mg of spironolactone Patient alternates 25mg and 50mg of spironolactoneHistorical Med      atenolol (TENORMIN) 25 MG tablet Take 12.5 mg by mouth nightly Pt takes at bedtime. Historical Med       !! - Potential duplicate medications found. Please discuss with provider. ALLERGIES     Albuterol; Biaxin [clarithromycin]; Ceclor [cefaclor]; Duricef [cefadroxil]; Fentanyl; Medrol [methylprednisolone]; Morphine; Norco [hydrocodone-acetaminophen]; Penicillins; Percocet [oxycodone-acetaminophen]; and Prednisone    FAMILY HISTORY       Family History   Problem Relation Age of Onset    Heart Disease Father     Cancer Father         lung    Heart Disease Mother         5 stents placed    Diabetes Mother     Diabetes Sister     Rheum Arthritis Sister           SOCIAL HISTORY       Social History     Social History    Marital status:      Spouse name: N/A    Number of children: N/A    Years of education: N/A     Occupational History    unemployed      Social History Main Topics    Smoking status: Former Smoker     Packs/day: 2.00     Years: 14.00     Types: Cigarettes     Quit date: 4/15/1981    Smokeless tobacco: Never Used    Alcohol use No    Drug use: No    Sexual activity: Not Asked     Other Topics Concern    None     Social History Narrative    None         REVIEW OF SYSTEMS    (2-9 systems for level 4, 10 or more for level 5)     Review of Systems   Respiratory: Positive for shortness of breath. Negative for cough.     Cardiovascular: Positive for Dilaudid and [facility] ED. Dr. Marcin Spaulding spoke with the patient's cardiologist and is okay to send the patient home and have her follow-up with him within the next couple days. I discussed with the patient and she agrees. She was also started to return to the ED if symptoms worsen. Vitals:    Vitals:    09/19/18 1934 09/19/18 2004 09/19/18 2020 09/19/18 2050   BP: (!) 121/49 (!) 146/69 (!) 126/57 (!) 128/52   Pulse: 59 66 60 62   Resp: 16 15 8 17   Temp:       SpO2: 94% 96% 97% 97%   Weight:       Height:             CONSULTS:  None    PROCEDURES:  Procedures    FINAL IMPRESSION      1.  Nonspecific chest pain          DISPOSITION/PLAN   DISPOSITION Decision To Discharge 09/19/2018 09:05:20 PM      PATIENT REFERRED TO:     Follow-up with your cardiologist.  Schedule an appointment as soon as possible for a visit       Zuly Shook MD      As needed    [facility] ED  [address] [facility]  [phone]    If symptoms worsen      DISCHARGE MEDICATIONS:     Discharge Medication List as of 9/19/2018  9:07 PM        Electronically signed by LIZETTE Carrera CNP on 9/19/2018 at 9:17 PM           LIZETTE Carrera CNP  09/19/18 7543 Home

## 2019-03-18 ENCOUNTER — HOSPITAL ENCOUNTER (OUTPATIENT)
Dept: MRI IMAGING | Age: 64
Discharge: HOME | End: 2019-03-20
Payer: COMMERCIAL

## 2019-03-18 DIAGNOSIS — M54.12 CERVICAL RADICULOPATHY: ICD-10-CM

## 2019-03-18 PROCEDURE — 72148 MRI LUMBAR SPINE W/O DYE: CPT

## 2019-04-15 ENCOUNTER — OFFICE VISIT (OUTPATIENT)
Dept: PODIATRY | Age: 64
End: 2019-04-15
Payer: COMMERCIAL

## 2019-04-15 VITALS — BODY MASS INDEX: 43.4 KG/M2 | RESPIRATION RATE: 16 BRPM | HEIGHT: 69 IN | WEIGHT: 293 LBS

## 2019-04-15 DIAGNOSIS — R26.2 DIFFICULTY WALKING: ICD-10-CM

## 2019-04-15 DIAGNOSIS — B35.1 DERMATOPHYTOSIS OF NAIL: ICD-10-CM

## 2019-04-15 DIAGNOSIS — L60.0 INGROWN NAIL: ICD-10-CM

## 2019-04-15 DIAGNOSIS — M79.605 PAIN OF LEFT LOWER EXTREMITY: Primary | ICD-10-CM

## 2019-04-15 DIAGNOSIS — R60.0 EDEMA OF LOWER EXTREMITY: ICD-10-CM

## 2019-04-15 PROCEDURE — G8427 DOCREV CUR MEDS BY ELIG CLIN: HCPCS | Performed by: PODIATRIST

## 2019-04-15 PROCEDURE — 11721 DEBRIDE NAIL 6 OR MORE: CPT | Performed by: PODIATRIST

## 2019-04-15 PROCEDURE — 3017F COLORECTAL CA SCREEN DOC REV: CPT | Performed by: PODIATRIST

## 2019-04-15 PROCEDURE — 1036F TOBACCO NON-USER: CPT | Performed by: PODIATRIST

## 2019-04-15 PROCEDURE — 99213 OFFICE O/P EST LOW 20 MIN: CPT | Performed by: PODIATRIST

## 2019-04-15 PROCEDURE — G8417 CALC BMI ABV UP PARAM F/U: HCPCS | Performed by: PODIATRIST

## 2019-04-15 PROCEDURE — G8598 ASA/ANTIPLAT THER USED: HCPCS | Performed by: PODIATRIST

## 2019-04-25 NOTE — PROGRESS NOTES
on exertion     Eczema     GERD (gastroesophageal reflux disease)     Headache     MIGRAINES    HTN (hypertension)     MVP (mitral valve prolapse)     Obesity     Poison ivy     States in lungs    Sleep apnea     COMPLIANT ON CPAP     Thoracic outlet syndrome     bilateral    Thyroid disease        Prior to Admission medications    Medication Sig Start Date End Date Taking? Authorizing Provider   metoclopramide (REGLAN) 10 MG tablet Take 10 mg by mouth three times daily   Yes Historical Provider, MD   methocarbamol (ROBAXIN-750) 750 MG tablet Take 1 tablet by mouth 3 times daily   WARNING:  May cause drowsiness.  May impair ability to operate vehicles or machinery.  Do not use in combination with alcohol. 3/2/19  Yes Migel Collins, APRN - CNP   Cholecalciferol (VITAMIN D3) 5000 units TABS Take 5,000 Units by mouth daily    Yes Historical Provider, MD   rosuvastatin (CRESTOR) 5 MG tablet Take 5 mg by mouth daily   Yes Linnea Willis MD   ibuprofen (ADVIL;MOTRIN) 200 MG tablet Take 600 mg by mouth 3 times daily   Yes Historical Provider, MD   bumetanide (BUMEX) 1 MG tablet Take 1 mg by mouth three times a week Every Monday Wednesday and friday    Yes Historical Provider, MD   ranitidine (ZANTAC) 150 MG tablet Take 150 mg by mouth 2 times daily   Yes Historical Provider, MD   Mirabegron (MYRBETRIQ PO) Take 25 mg by mouth daily    Yes Historical Provider, MD   pioglitazone (ACTOS) 15 MG tablet Take 15 mg by mouth daily   Yes Historical Provider, MD   gabapentin (NEURONTIN) 100 MG capsule Take 100 mg by mouth 3 times daily.  .   Yes Historical Provider, MD   esomeprazole (NEXIUM) 24.65 MG CPDR capsule Take 1 capsule by mouth daily    Yes Historical Provider, MD   spironolactone (ALDACTONE) 50 MG tablet Take 50 mg by mouth daily    Yes Historical Provider, MD   levothyroxine (SYNTHROID) 25 MCG tablet Take 25 mcg by mouth Daily   Yes Historical Provider, MD   LORazepam (ATIVAN) 0.5 MG tablet Take 0.5 mg by mouth 2 times daily as needed. .   Yes Historical Provider, MD   aspirin 81 MG EC tablet Take 1 tablet by mouth daily. 13  Yes Carline Sin MD   atenolol (TENORMIN) 25 MG tablet Take 12.5 mg by mouth nightly Pt takes at bedtime.    Yes Historical Provider, MD       Past Surgical History:   Procedure Laterality Date    BACK SURGERY      lumbar fusion    CARDIAC CATHETERIZATION      CARPAL TUNNEL RELEASE Right     [facility] REM[facility]AL WITH IMPLANT Right 2018    [facility]    CATARACT REMOVAL WITH IMPLANT Left 2018    [facility]    CHOLECYSTECTOMY      CORONARY ARTERY BYPASS GRAFT  13    cabg x 2     CYSTOSCOPY  04/10/2018    FIRST RIB REMOVAL      bilateral    HYSTERECTOMY      KNEE CARTILAGE SURGERY Right     [place] CYSTOURETHROSCOPY N/A 4/10/2018    CYSTOSCOPY performed by Suha Kendall MD at [address] [place] [address] Right 2018    EYE CATARACT EMULSIFICATION IOL IMPLANT performed by Lázaro Conn MD at [address] [facility] Left 2018    EYE CATARACT EMULSIFICATION IOL IMPLANT performed by Lázaro Conn MD at [address] [facility] [place] Left     TONSILLECTOMY      TOTAL KNEE ARTHROPLASTY Left     TOTAL KNEE ARTHROPLASTY Right 2018    ULNAR TUNNEL RELEASE Right     VARICOSE VEIN SURGERY      bilateral       Family History   Problem Relation Age of Onset    Heart Disease Father     Cancer Father         lung    Heart Disease Mother         5 stents placed    Diabetes Mother     Diabetes Sister     Rheum Arthritis Sister        Social History     Tobacco Use    Smoking status: Former Smoker     Packs/day: 2.00     Years: 14.00     Pack years: 28.00     Types: Cigarettes     Last attempt to quit: 4/15/1981     Years since quittin.0    Smokeless tobacco: Never Used   Substance Use Topics    Alcohol use: No       Review of Systems    Review of Systems:   History obtained from chart review and the patient  General ROS: negative for - chills, fatigue, fever, night sweats or weight gain  Constitutional: Negative for chills, diaphoresis, fatigue, fever and unexpected weight change. Musculoskeletal: Positive for arthralgias, gait problem and joint swelling. Neurological ROS: negative for - behavioral changes, confusion, headaches or seizures. Negative for weakness and numbness. Dermatological ROS: negative for - mole changes, rash  Cardiovascular: Negative for leg swelling. Gastrointestinal: Negative for constipation, diarrhea, nausea and vomiting. Lower Extremity Physical Examination:   Vitals:   Vitals:    04/15/19 1036   Resp: 16     General: AAO x 3 in NAD. Dermatologic Exam:  Skin lesion/ulceration Absent . Skin No rashes or nodules noted. .       Musculoskeletal:     1st MPJ ROM decreased, Bilateral.  Muscle strength 5/5, Bilateral.  Minimal Pain present upon palpation of left 5th digit and 5th MPJ. Medial longitudinal arch, Bilateral WNL. Ankle ROM WNL,Bilateral.    Dorsally contracted digits absent digits 1-5 Bilateral.     Vascular: DP and PT pulses palpable 2/4, Bilateral.  CFT <3 seconds, Bilateral.  Hair growth present to the level of the digits, Bilateral.  Edema noted to left 5th MPJ. Varicosities absent, Bilateral. Erythema absent, Bilateral    Neurological: Sensation intact to light touch to level of digits, Bilateral.  Protective sensation intact 10/10 sites via 5.07/10g Winona-Sam Monofilament, Bilateral.  negative Tinel's, Bilateral.  negative Valleix sign, Bilateral.      Integument: Warm, dry, supple, Bilateral.  Open lesion absent, Bilateral.  Interdigital maceration absent to web spaces 1-4, Bilateral.  Nails are elongated thickened discolored with subungual debris 1-5 bilateral.  Fissures absent, Bilateral.     Radiographs:  3 views left foot:  There is no noted acute fracture or dislocation.   Good anatomical alignment    Asessment: Patient is a 62 y.o. female with:    Diagnosis Orders   1. Pain of left lower extremity  38096 - LA DEBRIDEMENT OF NAILS, 6 OR MORE   2. Dermatophytosis of nail  27461 - LA DEBRIDEMENT OF NAILS, 6 OR MORE   3. Ingrown nail  10677 - LA DEBRIDEMENT OF NAILS, 6 OR MORE   4. Edema of lower extremity  65015 - LA DEBRIDEMENT OF NAILS, 6 OR MORE   5. Difficulty walking  72944 - LA DEBRIDEMENT OF NAILS, 6 OR MORE       Plan: Patient examined and evaluated. Current condition and treatment options discussed in detail. Discussed conservative and surgical options with the patient. Sharp debridement of ingrown mycotic nails 1-5 bilateral using nail nipper and drummel. This was well tolerated. Advised pt to limit walking barefoot. Ice and elevate if pain returns. Verbal and written instructions given to patient. Contact office with any questions/problems/concerns. RTC in 9week(s).     4/15/2019    Electronically signed by Angelica Carrel, DPM on 4/25/2019 at 9:34 AM  4/15/2019

## 2019-07-03 ENCOUNTER — HOSPITAL ENCOUNTER (OUTPATIENT)
Age: 64
Discharge: HOME | End: 2019-07-03
Payer: COMMERCIAL

## 2019-07-03 LAB
T3 FREE: 2.98 PG/ML (ref 2.02–4.43)
THYROXINE, FREE: 1.3 NG/DL (ref 0.93–1.7)
TSH SERPL DL<=0.05 MIU/L-ACNC: 1.34 MIU/L (ref 0.3–5)

## 2019-07-03 PROCEDURE — 36415 COLL VENOUS BLD VENIPUNCTURE: CPT

## 2019-07-03 PROCEDURE — 84439 ASSAY OF FREE THYROXINE: CPT

## 2019-07-03 PROCEDURE — 84443 ASSAY THYROID STIM HORMONE: CPT

## 2019-07-03 PROCEDURE — 84481 FREE ASSAY (FT-3): CPT

## 2019-07-16 ENCOUNTER — OFFICE VISIT (OUTPATIENT)
Dept: PULMONOLOGY | Age: 64
End: 2019-07-16
Payer: COMMERCIAL

## 2019-07-16 VITALS
HEART RATE: 49 BPM | SYSTOLIC BLOOD PRESSURE: 118 MMHG | OXYGEN SATURATION: 98 % | RESPIRATION RATE: 12 BRPM | WEIGHT: 293 LBS | BODY MASS INDEX: 48.82 KG/M2 | HEIGHT: 65 IN | DIASTOLIC BLOOD PRESSURE: 80 MMHG

## 2019-07-16 DIAGNOSIS — J45.991 COUGH VARIANT ASTHMA: Primary | ICD-10-CM

## 2019-07-16 DIAGNOSIS — Z99.89 OSA ON CPAP: ICD-10-CM

## 2019-07-16 DIAGNOSIS — G47.33 OSA ON CPAP: ICD-10-CM

## 2019-07-16 DIAGNOSIS — J30.9 ALLERGIC RHINITIS, UNSPECIFIED SEASONALITY, UNSPECIFIED TRIGGER: ICD-10-CM

## 2019-07-16 DIAGNOSIS — E66.01 MORBID OBESITY WITH BMI OF 50.0-59.9, ADULT (HCC): ICD-10-CM

## 2019-07-16 PROCEDURE — 1036F TOBACCO NON-USER: CPT | Performed by: INTERNAL MEDICINE

## 2019-07-16 PROCEDURE — G8427 DOCREV CUR MEDS BY ELIG CLIN: HCPCS | Performed by: INTERNAL MEDICINE

## 2019-07-16 PROCEDURE — 3017F COLORECTAL CA SCREEN DOC REV: CPT | Performed by: INTERNAL MEDICINE

## 2019-07-16 PROCEDURE — G8598 ASA/ANTIPLAT THER USED: HCPCS | Performed by: INTERNAL MEDICINE

## 2019-07-16 PROCEDURE — 99214 OFFICE O/P EST MOD 30 MIN: CPT | Performed by: INTERNAL MEDICINE

## 2019-07-16 PROCEDURE — G8417 CALC BMI ABV UP PARAM F/U: HCPCS | Performed by: INTERNAL MEDICINE

## 2019-07-16 NOTE — PROGRESS NOTES
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 and was on Xopenex in the past and for last 1 to 1.5  year or more she had no problem with cough, sob or wheezing and had not required any bronchodilators and was doing very well until episode 3-4 months ago        Subjective:   Review of Systems -   General ROS: negative for fever, night sweats or wt loss  ENT ROS: Negative for -  nasal congestion and postnasal drip  Allergy and Immunology ROS: positive for - seasonal allergies  Respiratory ROS: Negative for dry cough, No shortness of breath on walking , no Wheezing, no orthopnea and no PND  Cardiovascular ROS: no chest pain or dyspnea on exertion  Gastrointestinal ROS: no abdominal pain, change in bowel habits, or black or bloody stools, positive for reflux and heartburn  Musculoskeletal ROS: negative for - numbness tingling of extremeties, joints pain and joint swelling   Hem/Onc: negative for bleeding and bruising  Skin: negative for rash and skin lesion   : negative for hematuria, dysuria, frequency and nocturia  CNS: negative for dizziness, weakness, diplopia, tingling numbness headache seizure      Sleep Medicine 7/11/2018 8/31/2016   Sitting and reading 0 0   Watching TV 0 0   Sitting, inactive in a public place (e.g. a theatre or a meeting) 0 0   As a passenger in a car for an hour without a break 0 0   Lying down to rest in the afternoon when circumstances permit 0 0   Sitting and talking to someone 0 0   Sitting quietly after a lunch without alcohol 0 0   In a car, while stopped for a few minutes in traffic 0 0   Total score 0 0       Allergies: Allergies   Allergen Reactions    Biaxin [Clarithromycin] Hives    Ceclor [Cefaclor] Hives    Duricef [Cefadroxil] Hives    Medrol [Methylprednisolone] Other (See Comments)     Swelling of tongue.     Penicillins Hives    Percocet [Oxycodone-Acetaminophen]

## 2019-07-25 ENCOUNTER — HOSPITAL ENCOUNTER (OUTPATIENT)
Dept: MAMMOGRAPHY | Age: 64
Discharge: HOME | End: 2019-07-27
Payer: COMMERCIAL

## 2019-07-25 DIAGNOSIS — Z12.31 VISIT FOR SCREENING MAMMOGRAM: ICD-10-CM

## 2019-07-25 PROCEDURE — 77063 BREAST TOMOSYNTHESIS BI: CPT

## 2019-08-15 ENCOUNTER — APPOINTMENT (OUTPATIENT)
Dept: GENERAL RADIOLOGY | Age: 64
End: 2019-08-15
Payer: COMMERCIAL

## 2019-08-15 ENCOUNTER — HOSPITAL ENCOUNTER (EMERGENCY)
Age: 64
Discharge: HOME | End: 2019-08-15
Attending: EMERGENCY MEDICINE
Payer: COMMERCIAL

## 2019-08-15 VITALS
WEIGHT: 293 LBS | HEIGHT: 65 IN | HEART RATE: 57 BPM | DIASTOLIC BLOOD PRESSURE: 82 MMHG | TEMPERATURE: 97.6 F | RESPIRATION RATE: 14 BRPM | OXYGEN SATURATION: 97 % | BODY MASS INDEX: 48.82 KG/M2 | SYSTOLIC BLOOD PRESSURE: 147 MMHG

## 2019-08-15 DIAGNOSIS — M54.6 ACUTE RIGHT-SIDED THORACIC BACK PAIN: Primary | ICD-10-CM

## 2019-08-15 LAB
BILIRUBIN URINE: NEGATIVE
COLOR: YELLOW
COMMENT UA: ABNORMAL
GLUCOSE URINE: NEGATIVE
KETONES, URINE: NEGATIVE
LEUKOCYTE ESTERASE, URINE: NEGATIVE
NITRITE, URINE: NEGATIVE
PH UA: 6 (ref 5–8)
PROTEIN UA: NEGATIVE
SPECIFIC GRAVITY UA: 1 (ref 1–1.03)
TURBIDITY: CLEAR
URINE HGB: NEGATIVE
UROBILINOGEN, URINE: NORMAL

## 2019-08-15 PROCEDURE — 99283 EMERGENCY DEPT VISIT LOW MDM: CPT

## 2019-08-15 PROCEDURE — 72072 X-RAY EXAM THORAC SPINE 3VWS: CPT

## 2019-08-15 PROCEDURE — 6370000000 HC RX 637 (ALT 250 FOR IP): Performed by: NURSE PRACTITIONER

## 2019-08-15 PROCEDURE — 81003 URINALYSIS AUTO W/O SCOPE: CPT

## 2019-08-15 RX ORDER — OXYCODONE HYDROCHLORIDE AND ACETAMINOPHEN 5; 325 MG/1; MG/1
1 TABLET ORAL EVERY 6 HOURS PRN
Qty: 10 TABLET | Refills: 0 | Status: SHIPPED | OUTPATIENT
Start: 2019-08-15 | End: 2019-08-20

## 2019-08-15 RX ORDER — METHOCARBAMOL 500 MG/1
500 TABLET, FILM COATED ORAL 3 TIMES DAILY PRN
Qty: 20 TABLET | Refills: 0 | Status: SHIPPED | OUTPATIENT
Start: 2019-08-15 | End: 2019-08-25

## 2019-08-15 RX ORDER — OXYCODONE HYDROCHLORIDE AND ACETAMINOPHEN 5; 325 MG/1; MG/1
1 TABLET ORAL ONCE
Status: COMPLETED | OUTPATIENT
Start: 2019-08-15 | End: 2019-08-15

## 2019-08-15 RX ADMIN — OXYCODONE AND ACETAMINOPHEN 1 TABLET: 5; 325 TABLET ORAL at 22:10

## 2019-08-15 ASSESSMENT — PAIN SCALES - GENERAL: PAINLEVEL_OUTOF10: 8

## 2019-08-16 ASSESSMENT — ENCOUNTER SYMPTOMS
VOMITING: 0
DIARRHEA: 0
NAUSEA: 0
SHORTNESS OF BREATH: 0
CONSTIPATION: 0
BACK PAIN: 1
CHEST TIGHTNESS: 0
COUGH: 0

## 2019-08-16 NOTE — ED PROVIDER NOTES
[address]     Pt Name: Shahla Allen  MRN: [id number]  [place] 1955  Date of evaluation: 8/16/19   Shahla Allen is a 62 y.o. female with CC: Back Pain    MDM:            CRITICAL CARE:       EKG: All EKG's are interpreted by the Emergency Department Physician who either signs or Co-signs this chart in the absence of a cardiologist.      RADIOLOGY:All plain film, CT, MRI, and formal ultrasound images (except ED bedside ultrasound) are read by the radiologist, see reports below, unless otherwise noted in MDM or here. XR THORACIC SPINE (3 VIEWS)   Final Result   1. Osteopenia without convincing acute abnormality of the thoracic spine. 2. Multilevel osteophytic changes. LABS: All lab results were reviewed by myself, and all abnormals are listed below.   Labs Reviewed   URINALYSIS - Abnormal; Notable for the following components:       Result Value    Specific Gravity, UA 1.003 (*)     All other components within normal limits       PASTMEDICAL HISTORY     Past Medical History:   Diagnosis Date    Allergic rhinitis     Arthritis     Arthritis     Asthma     CAD (coronary artery disease) 8/6/13    cabg x 2    Chlorine inhalation lung injury ([address]     Severe irritability and airway reactivity     Chronic back pain     Cough variant asthma     Dyslipidemia     Dyspnea on exertion     Eczema     GERD (gastroesophageal reflux disease)     Headache     MIGRAINES    HTN (hypertension)     MVP (mitral valve prolapse)     Obesity     Morbid obesity with BMI of 50.0-59.9, adult    Poison ivy     States in lungs    Sleep apnea     COMPLIANT ON CPAP     Thoracic outlet syndrome     bilateral    Thyroid disease      SURGICAL HISTORY       Past Surgical History:   Procedure Laterality Date    BACK SURGERY      lumbar fusion    CARDIAC CATHETERIZATION      CARPAL TUNNEL RELEASE Right     CATARACT REMOVAL WITH IMPLANT Right 08/07/2018 [facility]    CATARACT REMOVAL WITH IMPLANT Left 08/28/2018    [facility]    CHOLECYSTECTOMY      CORONARY ARTERY BYPASS GRAFT  8/6/13    cabg x 2     CYSTOSCOPY  04/10/2018    FIRST RIB REMOVAL      bilateral    HYSTERECTOMY      KNEE CARTILAGE SURGERY Right     CT CYSTOURETHROSCOPY N/A 4/10/2018    CYSTOSCOPY performed by Robbie Vigil MD at [address] [facility] Right 8/7/2018    EYE CATARACT EMULSIFICATION IOL IMPLANT performed by Jeanne Odonnell MD at [address] [facility] Left 8/28/2018    EYE CATARACT EMULSIFICATION IOL IMPLANT performed by Jeanne Odonnell MD at [address] Left     TONSILLECTOMY      TOTAL KNEE ARTHROPLASTY Left     TOTAL KNEE ARTHROPLASTY Right 03/2018    ULNAR TUNNEL RELEASE Right     VARICOSE VEIN SURGERY      bilateral     CURRENT MEDICATIONS       Previous Medications    ASPIRIN 81 MG EC TABLET    Take 1 tablet by mouth daily. ATENOLOL (TENORMIN) 25 MG TABLET    Take 12.5 mg by mouth nightly Pt takes at bedtime. BUMETANIDE (BUMEX) 1 MG TABLET    Take 1 mg by mouth three times a week Every Monday Wednesday and friday     CHOLECALCIFEROL (VITAMIN D3) 5000 UNITS TABS    Take 5,000 Units by mouth daily     ESOMEPRAZOLE (NEXIUM) 24.65 MG CPDR CAPSULE    Take 1 capsule by mouth daily     GABAPENTIN (NEURONTIN) 100 MG CAPSULE    Take 100 mg by mouth 3 times daily. .    IBUPROFEN (ADVIL;MOTRIN) 200 MG TABLET    Take 600 mg by mouth 3 times daily    LEVOTHYROXINE (SYNTHROID) 25 MCG TABLET    Take 50 mcg by mouth Daily Take 50mcg Daily    LORAZEPAM (ATIVAN) 0.5 MG TABLET    Take 0.5 mg by mouth 2 times daily as needed. [place] Daily     MIRABEGRON (MYRBETRIQ PO)    Take 25 mg by mouth daily     OMEGA-3 FATTY ACIDS (OMEGA 3 PO)    Take by mouth 2 times daily    PIOGLITAZONE (ACTOS) 15 MG TABLET    Take 15 mg by mouth daily    RANITIDINE (ZANTAC) 150 MG TABLET    Take 150 mg by mouth 2 times

## 2019-08-16 NOTE — ED PROVIDER NOTES
[address] [place] [address] ED  EMERGENCY DEPARTMENT ENCOUNTER      Pt Name: Kade Bush  MRN: [id number]  [place] 1955  Date of evaluation: 8/15/2019  Provider: LIZETTE Velazquez CNP    CHIEF COMPLAINT       Chief Complaint   Patient presents with    Back Pain         HISTORY OF PRESENT ILLNESS  (Location/Symptom, Timing/Onset, Context/Setting, Quality, Duration, Modifying Factors, Severity.)   Kade Bush is a 62 y.o. female who presents to the emergency department via private auto with back pain that started this morning without injury or trauma. She has right thoracic back pain that radiates to the right flank. Pain is constant and worse with movement. She has nausea without vomiting. No difficulty urinating, dysuria or hematuria. No history of kidney stones. Not attempted to take anything for her symptoms. Nursing Notes were reviewed. ALLERGIES     Biaxin [clarithromycin]; Ceclor [cefaclor]; Duricef [cefadroxil]; Medrol [methylprednisolone]; Penicillins; Percocet [oxycodone-acetaminophen]; Prednisone; Albuterol; Morphine; Norco [hydrocodone-acetaminophen];  Paregoric; and Fentanyl    CURRENT MEDICATIONS       Discharge Medication List as of 8/15/2019 11:35 PM      CONTINUE these medications which have NOT CHANGED    Details   Omega-3 Fatty Acids (OMEGA 3 PO) Take by mouth 2 times dailyHistorical Med      Cholecalciferol (VITAMIN D3) 5000 units TABS Take 5,000 Units by mouth daily Historical Med      rosuvastatin (CRESTOR) 5 MG tablet Take 5 mg by mouth dailyHistorical Med      ibuprofen (ADVIL;MOTRIN) 200 MG tablet Take 600 mg by mouth 3 times dailyHistorical Med      bumetanide (BUMEX) 1 MG tablet Take 1 mg by mouth three times a week Every Monday Wednesday and friday Historical Med      ranitidine (ZANTAC) 150 MG tablet Take 150 mg by mouth 2 times dailyHistorical Med      Mirabegron (MYRBETRIQ PO) Take 25 mg by mouth daily Historical Med      pioglitazone (ACTOS) 15 MG tablet Take 15 mg by mouth 08/15/19 2106 08/15/19 2107   BP:  (!) 147/82   Pulse: 57    Resp: 14    Temp: 97.6 °F (36.4 °C)    SpO2: 97%    Weight: (!) 308 lb 5 oz (139.8 kg)    Height: 5' 5\" (1.651 m)        Medical Decision Makin-year-old female with reproducible back pain. She was medicated with Percocet. Urinalysis does not have any evidence of infection and there is no blood. X-ray of the thoracic spine is unremarkable. He was eventually discharged home with prescriptions for Percocet and Robaxin. She will follow-up with her primary care provider. FINAL IMPRESSION      1. Acute right-sided thoracic back pain          DISPOSITION/PLAN   DISPOSITION Decision To Discharge 08/15/2019 11:33:20 PM      PATIENT REFERRED TO:   Sedrick Whitley, [address] [place], [place] [id number]  [phone]    In 1 week        DISCHARGE MEDICATIONS:     Discharge Medication List as of 8/15/2019 11:35 PM      START taking these medications    Details   oxyCODONE-acetaminophen (PERCOCET) 5-325 MG per tablet Take 1 tablet by mouth every 6 hours as needed for Pain for up to 5 days. , Disp-10 tablet, R-0Print                 (Please note that portions of this note were completed with a voice recognition program.  Efforts were made to edit the dictations but occasionally words are mis-transcribed. )    TORSTEN CRISTOBAL, LIZETTE - NATE Arevalo, APRN - CNP  [phone]

## 2019-10-17 ENCOUNTER — HOSPITAL ENCOUNTER (OUTPATIENT)
Age: 64
Discharge: HOME | End: 2019-10-17
Payer: COMMERCIAL

## 2019-10-17 LAB
CHOLESTEROL, FASTING: 129 MG/DL
CHOLESTEROL/HDL RATIO: 3
HDLC SERPL-MCNC: 43 MG/DL
LDL CHOLESTEROL: 36 MG/DL (ref 0–130)
T3 FREE: 3.44 PG/ML (ref 2.02–4.43)
THYROXINE, FREE: 1.34 NG/DL (ref 0.93–1.7)
TOTAL CK: 98 U/L (ref 26–192)
TRIGLYCERIDE, FASTING: 250 MG/DL
TSH SERPL DL<=0.05 MIU/L-ACNC: 1.5 MIU/L (ref 0.3–5)
VLDLC SERPL CALC-MCNC: ABNORMAL MG/DL (ref 1–30)

## 2019-10-17 PROCEDURE — 84439 ASSAY OF FREE THYROXINE: CPT

## 2019-10-17 PROCEDURE — 82550 ASSAY OF CK (CPK): CPT

## 2019-10-17 PROCEDURE — 36415 COLL VENOUS BLD VENIPUNCTURE: CPT

## 2019-10-17 PROCEDURE — 80061 LIPID PANEL: CPT

## 2019-10-17 PROCEDURE — 84443 ASSAY THYROID STIM HORMONE: CPT

## 2019-10-17 PROCEDURE — 84481 FREE ASSAY (FT-3): CPT

## 2019-10-29 ENCOUNTER — HOSPITAL ENCOUNTER (OUTPATIENT)
Dept: MAMMOGRAPHY | Age: 64
Discharge: HOME | End: 2019-10-31
Payer: COMMERCIAL

## 2019-10-29 ENCOUNTER — HOSPITAL ENCOUNTER (OUTPATIENT)
Dept: ULTRASOUND IMAGING | Age: 64
Discharge: HOME | End: 2019-10-31
Payer: COMMERCIAL

## 2019-10-29 DIAGNOSIS — N64.4 BREAST PAIN: ICD-10-CM

## 2019-10-29 DIAGNOSIS — N64.4 BREAST PAIN IN FEMALE: ICD-10-CM

## 2019-10-29 PROCEDURE — G0279 TOMOSYNTHESIS, MAMMO: HCPCS

## 2019-10-29 PROCEDURE — 76642 ULTRASOUND BREAST LIMITED: CPT

## 2019-11-22 ENCOUNTER — APPOINTMENT (OUTPATIENT)
Dept: GENERAL RADIOLOGY | Age: 64
End: 2019-11-22
Attending: ANESTHESIOLOGY
Payer: COMMERCIAL

## 2019-11-22 ENCOUNTER — HOSPITAL ENCOUNTER (OUTPATIENT)
Age: 64
Setting detail: OUTPATIENT SURGERY
Discharge: HOME | End: 2019-11-22
Attending: ANESTHESIOLOGY | Admitting: ANESTHESIOLOGY
Payer: COMMERCIAL

## 2019-11-22 VITALS
HEIGHT: 65 IN | DIASTOLIC BLOOD PRESSURE: 73 MMHG | BODY MASS INDEX: 47.58 KG/M2 | SYSTOLIC BLOOD PRESSURE: 119 MMHG | RESPIRATION RATE: 23 BRPM | TEMPERATURE: 97.2 F | WEIGHT: 285.6 LBS | OXYGEN SATURATION: 95 % | HEART RATE: 59 BPM

## 2019-11-22 PROCEDURE — 99152 MOD SED SAME PHYS/QHP 5/>YRS: CPT | Performed by: ANESTHESIOLOGY

## 2019-11-22 PROCEDURE — 7100000010 HC PHASE II RECOVERY - FIRST 15 MIN: Performed by: ANESTHESIOLOGY

## 2019-11-22 PROCEDURE — 3209999900 FLUORO FOR SURGICAL PROCEDURES

## 2019-11-22 PROCEDURE — 6360000004 HC RX CONTRAST MEDICATION: Performed by: ANESTHESIOLOGY

## 2019-11-22 PROCEDURE — 3600000050 HC PAIN LEVEL 1 BASE: Performed by: ANESTHESIOLOGY

## 2019-11-22 PROCEDURE — 7100000011 HC PHASE II RECOVERY - ADDTL 15 MIN: Performed by: ANESTHESIOLOGY

## 2019-11-22 PROCEDURE — 2580000003 HC RX 258: Performed by: ANESTHESIOLOGY

## 2019-11-22 PROCEDURE — 6360000002 HC RX W HCPCS: Performed by: ANESTHESIOLOGY

## 2019-11-22 PROCEDURE — 2500000003 HC RX 250 WO HCPCS: Performed by: ANESTHESIOLOGY

## 2019-11-22 PROCEDURE — 2709999900 HC NON-CHARGEABLE SUPPLY: Performed by: ANESTHESIOLOGY

## 2019-11-22 RX ORDER — BUPIVACAINE HYDROCHLORIDE 2.5 MG/ML
INJECTION, SOLUTION EPIDURAL; INFILTRATION; INTRACAUDAL PRN
Status: DISCONTINUED | OUTPATIENT
Start: 2019-11-22 | End: 2019-11-22 | Stop reason: ALTCHOICE

## 2019-11-22 RX ORDER — SODIUM CHLORIDE, SODIUM LACTATE, POTASSIUM CHLORIDE, CALCIUM CHLORIDE 600; 310; 30; 20 MG/100ML; MG/100ML; MG/100ML; MG/100ML
INJECTION, SOLUTION INTRAVENOUS CONTINUOUS
Status: DISCONTINUED | OUTPATIENT
Start: 2019-11-22 | End: 2019-11-22 | Stop reason: HOSPADM

## 2019-11-22 RX ORDER — MIDAZOLAM HYDROCHLORIDE 1 MG/ML
INJECTION INTRAMUSCULAR; INTRAVENOUS PRN
Status: DISCONTINUED | OUTPATIENT
Start: 2019-11-22 | End: 2019-11-22 | Stop reason: ALTCHOICE

## 2019-11-22 RX ADMIN — SODIUM CHLORIDE, POTASSIUM CHLORIDE, SODIUM LACTATE AND CALCIUM CHLORIDE: 600; 310; 30; 20 INJECTION, SOLUTION INTRAVENOUS at 06:22

## 2019-11-22 ASSESSMENT — PAIN SCALES - GENERAL: PAINLEVEL_OUTOF10: 0

## 2019-11-22 ASSESSMENT — PAIN - FUNCTIONAL ASSESSMENT
PAIN_FUNCTIONAL_ASSESSMENT: 0-10
PAIN_FUNCTIONAL_ASSESSMENT: PREVENTS OR INTERFERES SOME ACTIVE ACTIVITIES AND ADLS

## 2019-11-22 ASSESSMENT — PAIN DESCRIPTION - DESCRIPTORS: DESCRIPTORS: SHOOTING

## 2020-02-08 ENCOUNTER — HOSPITAL ENCOUNTER (OUTPATIENT)
Age: 65
Setting detail: SPECIMEN
Discharge: HOME | End: 2020-02-08
Payer: COMMERCIAL

## 2020-02-08 LAB
ALBUMIN SERPL-MCNC: 4.2 G/DL (ref 3.5–5.2)
ALBUMIN/GLOBULIN RATIO: 1.3 (ref 1–2.5)
ALP BLD-CCNC: 50 U/L (ref 35–104)
ALT SERPL-CCNC: 33 U/L (ref 5–33)
ANION GAP SERPL CALCULATED.3IONS-SCNC: 16 MMOL/L (ref 9–17)
AST SERPL-CCNC: 31 U/L
BILIRUB SERPL-MCNC: 0.94 MG/DL (ref 0.3–1.2)
BUN BLDV-MCNC: 17 MG/DL (ref 8–23)
BUN/CREAT BLD: ABNORMAL (ref 9–20)
C-REACTIVE PROTEIN: 9.3 MG/L (ref 0–5)
CALCIUM SERPL-MCNC: 9.5 MG/DL (ref 8.6–10.4)
CHLORIDE BLD-SCNC: 103 MMOL/L (ref 98–107)
CO2: 22 MMOL/L (ref 20–31)
CREAT SERPL-MCNC: 0.9 MG/DL (ref 0.5–0.9)
CREATININE URINE: 46.9 MG/DL (ref 28–217)
GFR AFRICAN AMERICAN: >60 ML/MIN
GFR NON-AFRICAN AMERICAN: >60 ML/MIN
GFR SERPL CREATININE-BSD FRML MDRD: ABNORMAL ML/MIN/{1.73_M2}
GFR SERPL CREATININE-BSD FRML MDRD: ABNORMAL ML/MIN/{1.73_M2}
GLUCOSE BLD-MCNC: 100 MG/DL (ref 70–99)
MICROALBUMIN/CREAT 24H UR: <12 MG/L
MICROALBUMIN/CREAT UR-RTO: NORMAL MCG/MG CREAT
POTASSIUM SERPL-SCNC: 3.8 MMOL/L (ref 3.7–5.3)
RHEUMATOID FACTOR: <10 IU/ML
SEDIMENTATION RATE, ERYTHROCYTE: 22 MM (ref 0–20)
SODIUM BLD-SCNC: 141 MMOL/L (ref 135–144)
TOTAL PROTEIN: 7.5 G/DL (ref 6.4–8.3)
VITAMIN D 25-HYDROXY: 67.8 NG/ML (ref 30–100)

## 2020-02-08 PROCEDURE — 36415 COLL VENOUS BLD VENIPUNCTURE: CPT

## 2020-02-08 PROCEDURE — 86038 ANTINUCLEAR ANTIBODIES: CPT

## 2020-02-08 PROCEDURE — 82306 VITAMIN D 25 HYDROXY: CPT

## 2020-02-08 PROCEDURE — 82043 UR ALBUMIN QUANTITATIVE: CPT

## 2020-02-08 PROCEDURE — 80053 COMPREHEN METABOLIC PANEL: CPT

## 2020-02-08 PROCEDURE — 85651 RBC SED RATE NONAUTOMATED: CPT

## 2020-02-08 PROCEDURE — 83036 HEMOGLOBIN GLYCOSYLATED A1C: CPT

## 2020-02-08 PROCEDURE — 82570 ASSAY OF URINE CREATININE: CPT

## 2020-02-08 PROCEDURE — 86140 C-REACTIVE PROTEIN: CPT

## 2020-02-08 PROCEDURE — 86431 RHEUMATOID FACTOR QUANT: CPT

## 2020-02-08 PROCEDURE — 86200 CCP ANTIBODY: CPT

## 2020-02-09 LAB
ESTIMATED AVERAGE GLUCOSE: 103 MG/DL
HBA1C MFR BLD: 5.2 % (ref 4–6)

## 2020-02-10 LAB
ANTI-NUCLEAR ANTIBODY (ANA): NEGATIVE
CCP IGG ANTIBODIES: <1.5 U/ML

## 2020-02-25 ENCOUNTER — TELEPHONE (OUTPATIENT)
Dept: PULMONOLOGY | Age: 65
End: 2020-02-25

## 2020-04-27 ENCOUNTER — OFFICE VISIT (OUTPATIENT)
Dept: PODIATRY | Age: 65
End: 2020-04-27
Payer: COMMERCIAL

## 2020-04-27 VITALS — HEIGHT: 65 IN | BODY MASS INDEX: 48.65 KG/M2 | WEIGHT: 292 LBS | TEMPERATURE: 97.4 F

## 2020-04-27 PROCEDURE — G8427 DOCREV CUR MEDS BY ELIG CLIN: HCPCS | Performed by: PODIATRIST

## 2020-04-27 PROCEDURE — 99213 OFFICE O/P EST LOW 20 MIN: CPT | Performed by: PODIATRIST

## 2020-04-27 PROCEDURE — G8417 CALC BMI ABV UP PARAM F/U: HCPCS | Performed by: PODIATRIST

## 2020-04-27 PROCEDURE — 3017F COLORECTAL CA SCREEN DOC REV: CPT | Performed by: PODIATRIST

## 2020-04-27 PROCEDURE — 1036F TOBACCO NON-USER: CPT | Performed by: PODIATRIST

## 2020-04-27 NOTE — PROGRESS NOTES
[facility]  [facility]  [address] [place] [address]  Dept: [phone]  Dept Fax: [phone]    RETURN PATIENT PROGRESS NOTE  Date of patient's visit: 4/27/2020  Patient's Name:  Carroll Matias YOB: 1955            Patient Care Team:  Andrzej Farias MD as PCP - Betito Maier MD as Surgeon (Cardiothoracic Surgery)  Oscar Lawson MD as Consulting Physician (Pulmonology)  Amairani Real DPM as Physician (Podiatry)       Carroll Matias 60 y.o. female that presents for follow-up of   Chief Complaint   Patient presents with    Foot Pain    Bunions     left foot     Pt's primary care physician is Andrzej Farias MD last seen April 14 2020  Symptoms began 1 month(s) ago and are decreased . Patient relates pain is Present. Pain is rated 2 out of 10 and is described as intermittent. Treatments prior to today's visit include: previous podiatry treatment. Currently denies F/C/N/V. Allergies   Allergen Reactions    Biaxin [Clarithromycin] Hives    Ceclor [Cefaclor] Hives    Duricef [Cefadroxil] Hives    Medrol [Methylprednisolone] Other (See Comments)     Swelling of tongue.  Penicillins Hives    Percocet [Oxycodone-Acetaminophen] Shortness Of Breath     Only the     Prednisone Swelling     IV and oral. Makes tongue swell.  Albuterol Other (See Comments)     Burning in chest  Burning in chest    Hydrocodone-Acetaminophen Hives    Isoniazid     Morphine     Nickel      Other reaction(s):  Allergy: RASH;    Norco [Hydrocodone-Acetaminophen] Hives    Other      Other reaction(s): Unknown    Paregoric Hives    Fentanyl Nausea And Vomiting       Past Medical History:   Diagnosis Date    Allergic rhinitis     Arthritis     Arthritis     Asthma     CAD (coronary artery disease) 8/6/13    cabg x 2    Chlorine inhalation lung injury ([address]     Severe irritability and airway reactivity     Chronic back pain     Cough variant asthma     Dyslipidemia     Dyspnea on exertion     Eczema     GERD (gastroesophageal reflux disease)     Headache     MIGRAINES    HTN (hypertension)     MVP (mitral valve prolapse)     Obesity     Morbid obesity with BMI of 50.0-59.9, adult    Poison ivy     States in lungs    Sleep apnea     COMPLIANT ON CPAP     Thoracic outlet syndrome     bilateral    Thyroid disease        Prior to Admission medications    Medication Sig Start Date End Date Taking? Authorizing Provider   Omega-3 Fatty Acids (OMEGA 3 PO) Take by mouth 2 times daily   Yes Historical Provider, MD   Cholecalciferol (VITAMIN D3) 5000 units TABS Take 5,000 Units by mouth daily    Yes Historical Provider, MD   rosuvastatin (CRESTOR) 5 MG tablet Take 5 mg by mouth daily   Yes Shukri Myers MD   bumetanide (BUMEX) 1 MG tablet Take 1 mg by mouth three times a week Every Monday Wednesday and friday    Yes Historical Provider, MD   ranitidine (ZANTAC) 150 MG tablet Take 150 mg by mouth 2 times daily   Yes Historical Provider, MD   Mirabegron (MYRBETRIQ PO) Take 25 mg by mouth daily    Yes Historical Provider, MD   gabapentin (NEURONTIN) 100 MG capsule Take 100 mg by mouth 3 times daily. .   Yes Historical Provider, MD   esomeprazole (NEXIUM) 24.65 MG CPDR capsule Take 1 capsule by mouth daily    Yes Historical Provider, MD   spironolactone (ALDACTONE) 50 MG tablet Take 50 mg by mouth daily    Yes Historical Provider, MD   levothyroxine (SYNTHROID) 25 MCG tablet Take 50 mcg by mouth Daily Take 50mcg Daily   Yes Historical Provider, MD   LORazepam (ATIVAN) 0.5 MG tablet Take 0.5 mg by mouth 2 times daily as needed. .   Yes Historical Provider, MD   aspirin 81 MG EC tablet Take 1 tablet by mouth daily. 8/8/13  Yes Benito Mayes MD   atenolol (TENORMIN) 25 MG tablet Take 12.5 mg by mouth nightly Pt takes at bedtime.    Yes Historical Provider, MD       Review of Systems    Review of Systems:  History obtained from chart review and

## 2020-05-30 ENCOUNTER — HOSPITAL ENCOUNTER (OUTPATIENT)
Age: 65
Discharge: HOME | End: 2020-05-30
Payer: COMMERCIAL

## 2020-05-30 LAB
THYROXINE, FREE: 1.29 NG/DL (ref 0.93–1.7)
TSH SERPL DL<=0.05 MIU/L-ACNC: 0.87 MIU/L (ref 0.3–5)
VITAMIN D 25-HYDROXY: 52.9 NG/ML (ref 30–100)

## 2020-05-30 PROCEDURE — 36415 COLL VENOUS BLD VENIPUNCTURE: CPT

## 2020-05-30 PROCEDURE — 82306 VITAMIN D 25 HYDROXY: CPT

## 2020-05-30 PROCEDURE — 84443 ASSAY THYROID STIM HORMONE: CPT

## 2020-05-30 PROCEDURE — 83036 HEMOGLOBIN GLYCOSYLATED A1C: CPT

## 2020-05-30 PROCEDURE — 84439 ASSAY OF FREE THYROXINE: CPT

## 2020-05-31 LAB
ESTIMATED AVERAGE GLUCOSE: 97 MG/DL
HBA1C MFR BLD: 5 % (ref 4–6)

## 2020-06-05 ENCOUNTER — HOSPITAL ENCOUNTER (OUTPATIENT)
Dept: GENERAL RADIOLOGY | Age: 65
Discharge: HOME | End: 2020-06-07
Payer: COMMERCIAL

## 2020-06-05 ENCOUNTER — HOSPITAL ENCOUNTER (OUTPATIENT)
Age: 65
Discharge: HOME | End: 2020-06-07
Payer: COMMERCIAL

## 2020-06-05 PROCEDURE — 72072 X-RAY EXAM THORAC SPINE 3VWS: CPT

## 2020-06-05 PROCEDURE — 72100 X-RAY EXAM L-S SPINE 2/3 VWS: CPT

## 2020-06-19 ENCOUNTER — HOSPITAL ENCOUNTER (OUTPATIENT)
Dept: MRI IMAGING | Age: 65
Discharge: HOME | End: 2020-06-21
Payer: COMMERCIAL

## 2020-06-19 PROCEDURE — 72146 MRI CHEST SPINE W/O DYE: CPT

## 2020-06-19 PROCEDURE — 72148 MRI LUMBAR SPINE W/O DYE: CPT

## 2020-07-13 ENCOUNTER — APPOINTMENT (OUTPATIENT)
Dept: CT IMAGING | Age: 65
End: 2020-07-13
Payer: COMMERCIAL

## 2020-07-13 ENCOUNTER — HOSPITAL ENCOUNTER (EMERGENCY)
Age: 65
Discharge: HOME | End: 2020-07-13
Attending: EMERGENCY MEDICINE
Payer: COMMERCIAL

## 2020-07-13 VITALS
TEMPERATURE: 97.9 F | WEIGHT: 209 LBS | SYSTOLIC BLOOD PRESSURE: 114 MMHG | HEIGHT: 65 IN | HEART RATE: 59 BPM | BODY MASS INDEX: 34.82 KG/M2 | RESPIRATION RATE: 16 BRPM | OXYGEN SATURATION: 99 % | DIASTOLIC BLOOD PRESSURE: 75 MMHG

## 2020-07-13 LAB
-: NORMAL
ABSOLUTE EOS #: 0.17 K/UL (ref 0–0.44)
ABSOLUTE IMMATURE GRANULOCYTE: 0.02 K/UL (ref 0–0.3)
ABSOLUTE LYMPH #: 2.38 K/UL (ref 1.1–3.7)
ABSOLUTE MONO #: 0.38 K/UL (ref 0.1–1.2)
ALBUMIN SERPL-MCNC: 4.3 G/DL (ref 3.5–5.2)
ALBUMIN/GLOBULIN RATIO: ABNORMAL (ref 1–2.5)
ALP BLD-CCNC: 54 U/L (ref 35–104)
ALT SERPL-CCNC: 44 U/L (ref 5–33)
AMORPHOUS: NORMAL
ANION GAP SERPL CALCULATED.3IONS-SCNC: 10 MMOL/L (ref 9–17)
AST SERPL-CCNC: 43 U/L
BACTERIA: NORMAL
BASOPHILS # BLD: 1 % (ref 0–2)
BASOPHILS ABSOLUTE: 0.04 K/UL (ref 0–0.2)
BILIRUB SERPL-MCNC: 0.59 MG/DL (ref 0.3–1.2)
BILIRUBIN URINE: NEGATIVE
BUN BLDV-MCNC: 25 MG/DL (ref 8–23)
BUN/CREAT BLD: 31 (ref 9–20)
CALCIUM SERPL-MCNC: 9.6 MG/DL (ref 8.6–10.4)
CASTS UA: NORMAL /LPF
CHLORIDE BLD-SCNC: 105 MMOL/L (ref 98–107)
CHP ED QC CHECK: NORMAL
CO2: 24 MMOL/L (ref 20–31)
COLOR: YELLOW
COMMENT UA: ABNORMAL
CREAT SERPL-MCNC: 0.8 MG/DL (ref 0.5–0.9)
CRYSTALS, UA: NORMAL /HPF
DIFFERENTIAL TYPE: NORMAL
EOSINOPHILS RELATIVE PERCENT: 2 % (ref 1–4)
EPITHELIAL CELLS UA: NORMAL /HPF (ref 0–5)
GFR AFRICAN AMERICAN: >60 ML/MIN
GFR NON-AFRICAN AMERICAN: >60 ML/MIN
GFR SERPL CREATININE-BSD FRML MDRD: ABNORMAL ML/MIN/{1.73_M2}
GFR SERPL CREATININE-BSD FRML MDRD: ABNORMAL ML/MIN/{1.73_M2}
GLUCOSE BLD-MCNC: 119 MG/DL (ref 70–99)
GLUCOSE URINE: NEGATIVE
HCT VFR BLD CALC: 44.5 % (ref 36.3–47.1)
HEMOGLOBIN: 14.1 G/DL (ref 11.9–15.1)
IMMATURE GRANULOCYTES: 0 %
KETONES, URINE: NEGATIVE
LEUKOCYTE ESTERASE, URINE: ABNORMAL
LYMPHOCYTES # BLD: 33 % (ref 24–43)
MCH RBC QN AUTO: 30.7 PG (ref 25.2–33.5)
MCHC RBC AUTO-ENTMCNC: 31.7 G/DL (ref 28.4–34.8)
MCV RBC AUTO: 96.9 FL (ref 82.6–102.9)
MONOCYTES # BLD: 5 % (ref 3–12)
MUCUS: NORMAL
NITRITE, URINE: NEGATIVE
NRBC AUTOMATED: 0 PER 100 WBC
OTHER OBSERVATIONS UA: NORMAL
PDW BLD-RTO: 13.9 % (ref 11.8–14.4)
PH UA: 5.5 (ref 5–8)
PLATELET # BLD: 182 K/UL (ref 138–453)
PLATELET ESTIMATE: NORMAL
PMV BLD AUTO: 11.6 FL (ref 8.1–13.5)
POTASSIUM SERPL-SCNC: 4.1 MMOL/L (ref 3.7–5.3)
PROTEIN UA: NEGATIVE
RBC # BLD: 4.59 M/UL (ref 3.95–5.11)
RBC # BLD: NORMAL 10*6/UL
RBC UA: NORMAL /HPF (ref 0–2)
RENAL EPITHELIAL, UA: NORMAL /HPF
SEG NEUTROPHILS: 59 % (ref 36–65)
SEGMENTED NEUTROPHILS ABSOLUTE COUNT: 4.2 K/UL (ref 1.5–8.1)
SODIUM BLD-SCNC: 139 MMOL/L (ref 135–144)
SPECIFIC GRAVITY UA: 1.01 (ref 1–1.03)
TOTAL PROTEIN: 7.3 G/DL (ref 6.4–8.3)
TRICHOMONAS: NORMAL
TURBIDITY: CLEAR
URINE HGB: NEGATIVE
UROBILINOGEN, URINE: NORMAL
WBC # BLD: 7.2 K/UL (ref 3.5–11.3)
WBC # BLD: NORMAL 10*3/UL
WBC UA: NORMAL /HPF (ref 0–5)
YEAST: NORMAL

## 2020-07-13 PROCEDURE — 81001 URINALYSIS AUTO W/SCOPE: CPT

## 2020-07-13 PROCEDURE — 2580000003 HC RX 258: Performed by: PHYSICIAN ASSISTANT

## 2020-07-13 PROCEDURE — 6360000002 HC RX W HCPCS: Performed by: PHYSICIAN ASSISTANT

## 2020-07-13 PROCEDURE — 74176 CT ABD & PELVIS W/O CONTRAST: CPT

## 2020-07-13 PROCEDURE — 80053 COMPREHEN METABOLIC PANEL: CPT

## 2020-07-13 PROCEDURE — 96374 THER/PROPH/DIAG INJ IV PUSH: CPT

## 2020-07-13 PROCEDURE — 85025 COMPLETE CBC W/AUTO DIFF WBC: CPT

## 2020-07-13 PROCEDURE — 96375 TX/PRO/DX INJ NEW DRUG ADDON: CPT

## 2020-07-13 PROCEDURE — 99284 EMERGENCY DEPT VISIT MOD MDM: CPT

## 2020-07-13 RX ORDER — 0.9 % SODIUM CHLORIDE 0.9 %
1000 INTRAVENOUS SOLUTION INTRAVENOUS ONCE
Status: COMPLETED | OUTPATIENT
Start: 2020-07-13 | End: 2020-07-13

## 2020-07-13 RX ORDER — NAPROXEN 500 MG/1
500 TABLET ORAL 2 TIMES DAILY WITH MEALS
Qty: 20 TABLET | Refills: 0 | Status: SHIPPED | OUTPATIENT
Start: 2020-07-13 | End: 2020-07-14

## 2020-07-13 RX ORDER — METHOCARBAMOL 750 MG/1
750 TABLET, FILM COATED ORAL 4 TIMES DAILY
Qty: 40 TABLET | Refills: 0 | Status: SHIPPED | OUTPATIENT
Start: 2020-07-13 | End: 2020-07-14

## 2020-07-13 RX ORDER — OXYCODONE HYDROCHLORIDE AND ACETAMINOPHEN 5; 325 MG/1; MG/1
1-2 TABLET ORAL EVERY 6 HOURS PRN
Qty: 12 TABLET | Refills: 0 | Status: SHIPPED | OUTPATIENT
Start: 2020-07-13 | End: 2020-07-16

## 2020-07-13 RX ORDER — ONDANSETRON 2 MG/ML
4 INJECTION INTRAMUSCULAR; INTRAVENOUS ONCE
Status: COMPLETED | OUTPATIENT
Start: 2020-07-13 | End: 2020-07-13

## 2020-07-13 RX ORDER — HYDROMORPHONE HYDROCHLORIDE 1 MG/ML
1 INJECTION, SOLUTION INTRAMUSCULAR; INTRAVENOUS; SUBCUTANEOUS ONCE
Status: COMPLETED | OUTPATIENT
Start: 2020-07-13 | End: 2020-07-13

## 2020-07-13 RX ADMIN — ONDANSETRON 4 MG: 2 INJECTION INTRAMUSCULAR; INTRAVENOUS at 12:10

## 2020-07-13 RX ADMIN — SODIUM CHLORIDE 1000 ML: 9 INJECTION, SOLUTION INTRAVENOUS at 12:10

## 2020-07-13 RX ADMIN — HYDROMORPHONE HYDROCHLORIDE 1 MG: 1 INJECTION, SOLUTION INTRAMUSCULAR; INTRAVENOUS; SUBCUTANEOUS at 12:10

## 2020-07-13 ASSESSMENT — PAIN SCALES - GENERAL
PAINLEVEL_OUTOF10: 4
PAINLEVEL_OUTOF10: 8
PAINLEVEL_OUTOF10: 9

## 2020-07-13 ASSESSMENT — PAIN DESCRIPTION - LOCATION: LOCATION: FLANK

## 2020-07-13 ASSESSMENT — PAIN DESCRIPTION - PAIN TYPE: TYPE: ACUTE PAIN

## 2020-07-13 ASSESSMENT — PAIN DESCRIPTION - DESCRIPTORS: DESCRIPTORS: CONSTANT

## 2020-07-13 ASSESSMENT — PAIN DESCRIPTION - ORIENTATION: ORIENTATION: LEFT

## 2020-07-13 NOTE — ED PROVIDER NOTES
[address] [place] [address] ED  [facility]      Pt Name: Surekha Jose  MRN: [id number]  [place] 1955  Date ofevaluation: 7/13/2020  Provider: Sydney Traylor Dr       Chief Complaint   Patient presents with    Flank Pain     left         HISTORY OF PRESENT ILLNESS  (Location/Symptom, Timing/Onset, Context/Setting, Quality, Duration, Modifying Factors, Severity.)   Surekha Jose is a 60 y.o. female who presents to the emergency department with left flank pain since yesterday. Pain worse with movement and relieved with rest.  Pain described as mild to moderate, sore, stiff, constant. No other complaints. No nausea vomiting. Nursing Notes were reviewed. ALLERGIES     Biaxin [clarithromycin]; Ceclor [cefaclor]; Duricef [cefadroxil]; Medrol [methylprednisolone]; Penicillins; Percocet [oxycodone-acetaminophen]; Prednisone; Albuterol; Hydrocodone-acetaminophen; Isoniazid; Morphine; Nickel; Norco [hydrocodone-acetaminophen]; Other; Paregoric; and Fentanyl    CURRENT MEDICATIONS       Discharge Medication List as of 7/13/2020  1:10 PM      CONTINUE these medications which have NOT CHANGED    Details   Omega-3 Fatty Acids (OMEGA 3 PO) Take by mouth 2 times dailyHistorical Med      LORazepam (ATIVAN) 0.5 MG tablet Take 0.5 mg by mouth 2 times daily as needed. Angie Tate Historical Med      Cholecalciferol (VITAMIN D3) 5000 units TABS Take 5,000 Units by mouth daily Historical Med      rosuvastatin (CRESTOR) 5 MG tablet Take 5 mg by mouth dailyHistorical Med      bumetanide (BUMEX) 1 MG tablet Take 1 mg by mouth three times a week Every Monday Wednesday and friday Historical Med      ranitidine (ZANTAC) 150 MG tablet Take 150 mg by mouth 2 times dailyHistorical Med      Mirabegron (MYRBETRIQ PO) Take 25 mg by mouth daily Historical Med      gabapentin (NEURONTIN) 100 MG capsule Take 100 mg by mouth 3 times daily. Angie Tate Historical Med      esomeprazole (NEXIUM) 24.65 MG CPDR capsule Take 1 capsule by mouth daily Historical Med      spironolactone (ALDACTONE) 50 MG tablet Take 50 mg by mouth daily Historical Med      levothyroxine (SYNTHROID) 25 MCG tablet Take 50 mcg by mouth Daily Take 50mcg DailyHistorical Med      aspirin 81 MG EC tablet Take 1 tablet by mouth daily. , Disp-30 tablet, R-1      atenolol (TENORMIN) 25 MG tablet Take 12.5 mg by mouth nightly Pt takes at bedtime. Historical Med             PAST MEDICAL HISTORY         Diagnosis Date    Allergic rhinitis     Arthritis     Asthma     Bunion of left foot     CAD (coronary artery disease) 8/6/13    cabg x 2    Ch[facility]ine inhalation lung injury ([facility]     Severe irritability and airway reactivity     Chronic back pain     Cough variant asthma     Dyslipidemia     Dyspnea on exertion     Eczema     GERD (gastroesophageal reflux disease)     Headache     MIGRAINES    HTN (hypertension)     MVP (mitral valve prolapse)     Obesity     Morbid obesity with BMI of 50.0-59.9, adult    MARSHAL on CPAP     Poison ivy     States in lungs    Sleep apnea     COMPLIANT ON CPAP     Thoracic outlet syndrome     bilateral    Thyroid disease        SURGICAL HISTORY           Procedure Laterality Date    BACK SURGERY      lumbar fusion    CARDIAC CATHETERIZATION      CARPAL TUNNEL RELEASE Right     CATARACT REMOVAL WITH IMPLANT Right 08/07/2018    [facility]    CATARACT REMOVAL WITH IMPLANT Left 08/28/2018    [facility]    CHOLECYSTECTOMY      CORONARY ARTERY BYPASS GRAFT  8/6/13    cabg x 2     CYSTOSCOPY  04/10/2018    FIRST RIB REMOVAL      bilateral    HC INJECTION PROCEDURE FOR SACROILIAC JOINT Bilateral 11/22/2019    SACROILIAC JOINT INJECTION BILATERAL performed by Olimpia Barber MD at [facility] Right     MS CYSTOURETHROSCOPY N/A 4/10/2018    CYSTOSCOPY performed by Prakash Cummins MD at [facility]. 28. RMVL INSJ IO LENS PROSTH W/O ECP Right 2018    EYE CATARACT EMULSIFICATION IOL IMPLANT performed by Katlyn Sifuentes MD at [facility] W/O ECP Left 2018    EYE CATARACT EMULSIFICATION IOL IMPLANT performed by Katlyn Sifuentes MD at [address] Left     TONSILLECTOMY      TOTAL KNEE ARTHROPLASTY Left     TOTAL KNEE ARTHROPLASTY Right 2018    ULNAR TUNNEL RELEASE Right     VARICOSE VEIN SURGERY      bilateral         FAMILY HISTORY           Problem Relation Age of Onset    Heart Disease Father     Cancer Father         lung    Heart Disease Mother         5 stents placed    Diabetes Mother     Diabetes Sister     Rheum Arthritis Sister      Family Status   Relation Name Status    Father          lung cancer    Mother      Sister  Alive    Sister  Alive    Sister  Alive    Sister  Alive    Brother   at age less than hour old   Alon Cook Sister  (Not Specified)    Sister  (Not Specified)        SOCIAL HISTORY      reports that she quit smoking about 39 years ago. Her smoking use included cigarettes. She started smoking about 51 years ago. She has a 24.00 pack-year smoking history. She has never used smokeless tobacco. She reports that she does not drink alcohol or use drugs. REVIEW OFSYSTEMS    (2-9 systems for level 4, 10 or more for level 5)   Review of Systems    Except as noted above the remainder of the review of systems was reviewed and negative. PHYSICAL EXAM    (up to 7 for level 4, 8 or more for level 5)     ED Triage Vitals [20 1148]   BP Temp Temp Source Pulse Resp SpO2 Height Weight   114/75 97.9 °F (36.6 °C) Oral 59 16 99 % 5' 5\" (1.651 m) 209 lb (94.8 kg)      Physical Exam  Constitutional:       Appearance: She is well-developed. HENT:      Head: Normocephalic and atraumatic. Neck:      Musculoskeletal: Normal range of motion and neck supple. Cardiovascular:      Rate and Rhythm: Normal rate and regular rhythm. Pulmonary:      Effort: Pulmonary effort is normal.      Breath sounds: Normal breath sounds. Abdominal:      Palpations: Abdomen is soft. Tenderness: There is abdominal tenderness. Musculoskeletal: Normal range of motion. Skin:     General: Skin is warm. Findings: No rash. Neurological:      Mental Status: She is alert and oriented to person, place, and time. Psychiatric:         Behavior: Behavior normal.                 DIAGNOSTIC RESULTS     EKG: All EKG's are interpreted by the Emergency Department Physician who either signs or Co-signs this chart in the absence of a cardiologist.        RADIOLOGY:   Non-plain film images such as CT, Ultrasound and MRI are read by the radiologist. Plain radiographic images arevisualized and preliminarily interpreted by the emergency physician with the below findings:        Interpretation per the Radiologist below, if available at thetime of this note:          ED BEDSIDE ULTRASOUND:   Performed by ED Physician - none    LABS:  Labs Reviewed   COMPREHENSIVE METABOLIC PANEL - Abnormal; Notable for the following components:       Result Value    Glucose 119 (*)     BUN 25 (*)     Bun/Cre Ratio 31 (*)     ALT 44 (*)     AST 43 (*)     All other components within normal limits   URINE RT REFLEX TO CULTURE - Abnormal; Notable for the following components:    Leukocyte Esterase, Urine TRACE (*)     All other components within normal limits   POCT URINALYSIS DIPSTICK - Normal   CBC WITH AUTO DIFFERENTIAL   MICROSCOPIC URINALYSIS       All other labs were within normal range or not returned as of this dictation. EMERGENCY DEPARTMENT COURSE and DIFFERENTIAL DIAGNOSIS/MDM:   Vitals:    Vitals:    07/13/20 1148   BP: 114/75   Pulse: 59   Resp: 16   Temp: 97.9 °F (36.6 °C)   TempSrc: Oral   SpO2: 99%   Weight: 209 lb (94.8 kg)   Height: 5' 5\" (1.651 m)     Patient will be discharged home. CT scan negative.   Will treat symptomatically and discharged home.  CONSULTS:  None    PROCEDURES:  Procedures        FINAL IMPRESSION      1. Left flank pain          DISPOSITION/PLAN   DISPOSITION Decision To Discharge 07/13/2020 01:03:43 PM      PATIENTREFERRED TO:   Kaylyn Avalos MD  [address] [place] [address]  [phone]    In 3 days        DISCHARGE MEDICATIONS:     Discharge Medication List as of 7/13/2020  1:10 PM      START taking these medications    Details   naproxen (NAPROSYN) 500 MG tablet Take 1 tablet by mouth 2 times daily (with meals), Disp-20 tablet,R-0Print      oxyCODONE-acetaminophen (PERCOCET) 5-325 MG per tablet Take 1-2 tablets by mouth every 6 hours as needed for Pain for up to 3 days. , Disp-12 tablet,R-0Print      methocarbamol (ROBAXIN-750) 750 MG tablet Take 1 tablet by mouth 4 times daily for 10 days, Disp-40 tablet,R-0Print                 (Please note that portions of this note were completed with a voice recognition program.  Efforts were made to edit thedictations but occasionally words are mis-transcribed.)    Roselinda Bence, PA-C Roselinda Bence, PA-C  07/13/20 [id number]

## 2020-07-13 NOTE — ED PROVIDER NOTES
EMERGENCY DEPARTMENT ENCOUNTER   ATTENDING ATTESTATION     Pt Name: Zohaib Montejo  MRN: [id number]  [place] 1955  Date of evaluation: 7/13/20   Zohaib Montejo is a 60 y.o. female with CC: Flank Pain (left)    MDM:   The patient is a 28-year-old female who presented to the emergency department secondary to left flank pain. No acute findings on laboratory analysis or imaging. Results discussed with patient. Discharged home with outpatient follow-up with parameters for return to the emergency department. CRITICAL CARE:       EKG: All EKG's are interpreted by the Emergency Department Physician who either signs or Co-signs this chart in the absence of a cardiologist.      RADIOLOGY:All plain film, CT, MRI, and formal ultrasound images (except ED bedside ultrasound) are read by the radiologist, see reports below, unless otherwise noted in MDM or here. No orders to display     LABS: All lab results were reviewed by myself, and all abnormals are listed below. Labs Reviewed - No data to display  CONSULTS:  None  FINAL IMPRESSION    No diagnosis found.         PASTMEDICAL HISTORY     Past Medical History:   Diagnosis Date    Allergic rhinitis     Arthritis     Asthma     Bunion of left foot     CAD (coronary artery disease) 8/6/13    cabg x 2    Chlorine inhalation lung injury ([address]     Severe irritability and airway reactivity     Chronic back pain     Cough variant asthma     Dyslipidemia     Dyspnea on exertion     Eczema     GERD (gastroesophageal reflux disease)     Headache     MIGRAINES    HTN (hypertension)     MVP (mitral valve prolapse)     Obesity     Morbid obesity with BMI of 50.0-59.9, adult    MARSHAL on CPAP     Poison ivy     States in lungs    Sleep apnea     COMPLIANT ON CPAP     Thoracic outlet syndrome     bilateral    Thyroid disease      SURGICAL HISTORY       Past Surgical History:   Procedure Laterality Date    BACK SURGERY      lumbar fusion    CARDIAC CATHETERIZATION      CARPAL TUNNEL RELEASE Right     CATARACT REMOVAL WITH IMPLANT Right [facility]    [facility]    CATARACT REMOVAL WITH IMPLANT Left 08/28/2018    [facility]    CHOLECYSTECTOMY      CORONARY ARTERY BYPASS GRAFT  8/6/13    cabg x 2     CYSTOSCOPY  04/10/2018    FIRST RIB REMOVAL      bilateral    HC INJECTION PROCEDURE FOR SACROILIAC JOINT Bilateral 11/22/2019    SACROILIAC JOINT INJECTION BILATERAL performed by Joe Zambrano MD at [facility] Right     RI CYSTOURETHROSCOPY N/A 4/10/2018    CYSTOSCOPY performed by Ori Kwan MD at [facility] W/O ECP Right 8/7/2018    EYE CATARACT EMULSIFICATION IOL IMPLANT performed by Hemal Peres MD at [facility] RMV INS IO LENS PROSTH W/O ECP Left 8/28/2018    EYE CATARACT EMULSIFICATION IOL IMPLANT performed by Hemal Peres MD at [address] Left     TONSILLECTOMY      TOTAL KNEE ARTHROPLASTY Left     TOTAL KNEE ARTHROPLASTY Right 03/2018    ULNAR TUNNEL RELEASE Right     VARICOSE VEIN SURGERY      bilateral     CURRENT MEDICATIONS       Previous Medications    ASPIRIN 81 MG EC TABLET    Take 1 tablet by mouth daily. ATENOLOL (TENORMIN) 25 MG TABLET    Take 12.5 mg by mouth nightly Pt takes at bedtime. BUMETANIDE (BUMEX) 1 MG TABLET    Take 1 mg by mouth three times a week Every Monday Wednesday and friday     CHOLECALCIFEROL (VITAMIN D3) 5000 UNITS TABS    Take 5,000 Units by mouth daily     ESOMEPRAZOLE (NEXIUM) 24.65 MG CPDR CAPSULE    Take 1 capsule by mouth daily     GABAPENTIN (NEURONTIN) 100 MG CAPSULE    Take 100 mg by mouth 3 times daily. Collette Ruts LEVOTHYROXINE (SYNTHROID) 25 MCG TABLET    Take 50 mcg by mouth Daily Take 50mcg Daily    LORAZEPAM (ATIVAN) 0.5 MG TABLET    Take 0.5 mg by mouth 2 times daily as needed. Collette Ruts     MIRABEGRON (MYRBETRIQ PO)    Take 25 mg by mouth daily OMEGA-3 FATTY ACIDS (OMEGA 3 PO)    Take by mouth 2 times daily    RANITIDINE (ZANTAC) 150 MG TABLET    Take 150 mg by mouth 2 times daily    ROSUVASTATIN (CRESTOR) 5 MG TABLET    Take 5 mg by mouth daily    SPIRONOLACTONE (ALDACTONE) 50 MG TABLET    Take 50 mg by mouth daily      ALLERGIES     is allergic to biaxin [clarithromycin]; ceclor [cefaclor]; duricef [cefadroxil]; medrol [methylprednisolone]; penicillins; percocet [oxycodone-acetaminophen]; prednisone; albuterol; hydrocodone-acetaminophen; isoniazid; morphine; nickel; norco [hydrocodone-acetaminophen]; other; paregoric; and fentanyl. FAMILY HISTORY     She indicated that her mother is . She indicated that her father is . She indicated that four of her six sisters are alive. She indicated that her brother is . SOCIAL HISTORY       Social History     Tobacco Use    Smoking status: Former Smoker     Packs/day: 2.00     Years: 12.00     Pack years: 24.00     Types: Cigarettes     Start date: 1969     Last attempt to quit: 4/15/1981     Years since quittin.2    Smokeless tobacco: Never Used   Substance Use Topics    Alcohol use: No    Drug use: No       I personally evaluated and examined the patient in conjunction with the APC and agree with the assessment, treatment plan, and disposition of the patient as recorded by the APC.    Severiano Scudder, MD  Attending Emergency Physician          Severiano Scudder, MD  [phone]

## 2020-07-14 ENCOUNTER — OFFICE VISIT (OUTPATIENT)
Dept: PULMONOLOGY | Age: 65
End: 2020-07-14
Payer: COMMERCIAL

## 2020-07-14 VITALS
HEIGHT: 65 IN | RESPIRATION RATE: 18 BRPM | BODY MASS INDEX: 35.32 KG/M2 | SYSTOLIC BLOOD PRESSURE: 136 MMHG | DIASTOLIC BLOOD PRESSURE: 74 MMHG | WEIGHT: 212 LBS | HEART RATE: 49 BPM

## 2020-07-14 PROCEDURE — G8427 DOCREV CUR MEDS BY ELIG CLIN: HCPCS | Performed by: INTERNAL MEDICINE

## 2020-07-14 PROCEDURE — 3017F COLORECTAL CA SCREEN DOC REV: CPT | Performed by: INTERNAL MEDICINE

## 2020-07-14 PROCEDURE — G8417 CALC BMI ABV UP PARAM F/U: HCPCS | Performed by: INTERNAL MEDICINE

## 2020-07-14 PROCEDURE — 99214 OFFICE O/P EST MOD 30 MIN: CPT | Performed by: INTERNAL MEDICINE

## 2020-07-14 PROCEDURE — 1036F TOBACCO NON-USER: CPT | Performed by: INTERNAL MEDICINE

## 2020-07-14 RX ORDER — FAMOTIDINE 40 MG/1
TABLET, FILM COATED ORAL 2 TIMES DAILY
COMMUNITY

## 2020-07-14 ASSESSMENT — SLEEP AND FATIGUE QUESTIONNAIRES
HOW LIKELY ARE YOU TO NOD OFF OR FALL ASLEEP WHILE LYING DOWN TO REST IN THE AFTERNOON WHEN CIRCUMSTANCES PERMIT: 0
HOW LIKELY ARE YOU TO NOD OFF OR FALL ASLEEP IN A CAR, WHILE STOPPED FOR A FEW MINUTES IN TRAFFIC: 0
HOW LIKELY ARE YOU TO NOD OFF OR FALL ASLEEP WHILE SITTING AND TALKING TO SOMEONE: 0
HOW LIKELY ARE YOU TO NOD OFF OR FALL ASLEEP WHILE SITTING INACTIVE IN A PUBLIC PLACE: 0
HOW LIKELY ARE YOU TO NOD OFF OR FALL ASLEEP WHILE SITTING AND READING: 0
HOW LIKELY ARE YOU TO NOD OFF OR FALL ASLEEP WHEN YOU ARE A PASSENGER IN A CAR FOR AN HOUR WITHOUT A BREAK: 0
ESS TOTAL SCORE: 0
HOW LIKELY ARE YOU TO NOD OFF OR FALL ASLEEP WHILE WATCHING TV: 0
HOW LIKELY ARE YOU TO NOD OFF OR FALL ASLEEP WHILE SITTING QUIETLY AFTER LUNCH WITHOUT ALCOHOL: 0

## 2020-07-14 NOTE — PROGRESS NOTES
wheezing. Denies waking up at night with cough wheezing chest tightness or shortness of breath. She had not require any rescue inhaler last time she uses rescue inhaler was more than 2 year ago  She has only seasonal allergies denies significant daily postnasal dripping or nasal congestion.   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 and was on Xopenex in the past and for last 1 to 1.5  year or more she had no problem with cough, sob or wheezing and had not required any bronchodilators and was doing very well until episode 3-4 months ago        Subjective:   Review of Systems -   General ROS: negative for fever, night sweats or wt loss  ENT ROS: Negative for -  nasal congestion and postnasal drip  Allergy and Immunology ROS: positive for - seasonal allergies  Respiratory ROS: Negative for dry cough, No shortness of breath on walking , no Wheezing, no orthopnea and no PND  Cardiovascular ROS: no chest pain or dyspnea on exertion  Gastrointestinal ROS: no abdominal pain, change in bowel habits, or black or bloody stools, negative for reflux and heartburn  Musculoskeletal ROS: negative for - numbness tingling of extremeties, joints pain and joint swelling   Hem/Onc: negative for bleeding and bruising  Skin: negative for rash and skin lesion   : negative for hematuria, dysuria, frequency and nocturia  CNS: negative for dizziness, weakness, diplopia, tingling numbness headache seizure      Sleep Medicine 7/14/2020 8/31/2016   Sitting and reading 0 0   Watching TV 0 0   Sitting, inactive in a public place (e.g. a theatre or a meeting) 0 0   As a passenger in a car for an hour without a break 0 0   Lying down to rest in the afternoon when circumstances permit 0 0   Sitting and talking to someone 0 0   Sitting quietly after a lunch without alcohol 0 0   In a car, while stopped for a few minutes in traffic 0 0   Total score 0 0       Allergies: Allergies   Allergen Reactions    Biaxin [Clarithromycin] Hives    Ceclor [Cefaclor] Hives    Duricef [Cefadroxil] Hives    Medrol [Methylprednisolone] Other (See Comments)     Swelling of tongue.  Penicillins Hives    Percocet [Oxycodone-Acetaminophen] Shortness Of Breath     Only the     Prednisone Swelling     IV and oral. Makes tongue swell.  Albuterol Other (See Comments)     Burning in chest  Burning in chest    Hydrocodone-Acetaminophen Hives    Isoniazid     Morphine     Nickel      Other reaction(s): Allergy: RASH;    Norco [Hydrocodone-Acetaminophen] Hives    Other      Other reaction(s): Unknown    Paregoric Hives    Fentanyl Nausea And Vomiting       Medications:  Outpatient Encounter Medications as of 7/14/2020   Medication Sig Dispense Refill    famotidine (PEPCID) 40 MG tablet 2 times daily       Cyanocobalamin (VITAMIN B-12) 50 MCG LOZG as directed      Prenatal Vit-Fe Fumarate-FA (PRENATAL VITAMIN PO) Take 1 tablet by mouth daily      oxyCODONE-acetaminophen (PERCOCET) 5-325 MG per tablet Take 1-2 tablets by mouth every 6 hours as needed for Pain for up to 3 days. 12 tablet 0    Omega-3 Fatty Acids (OMEGA 3 PO) Take by mouth 2 times daily      Cholecalciferol (VITAMIN D3) 5000 units TABS Take 5,000 Units by mouth daily       bumetanide (BUMEX) 1 MG tablet Take 1 mg by mouth three times a week Every Monday Wednesday and friday       gabapentin (NEURONTIN) 100 MG capsule Take 100 mg by mouth 3 times daily.  Zaid Haskins esomeprazole (NEXIUM) 24.65 MG CPDR capsule Take 1 capsule by mouth daily       spironolactone (ALDACTONE) 50 MG tablet Take 50 mg by mouth daily       levothyroxine (SYNTHROID) 25 MCG tablet Take 50 mcg by mouth Daily Take 50mcg Daily      LORazepam (ATIVAN) 0.5 MG tablet Take 0.5 mg by mouth 2 times daily as needed. Regine Humphrey aspirin 81 MG EC tablet Take 1 tablet by mouth daily. 30 tablet 1    [DISCONTINUED] naproxen (NAPROSYN) 500 MG tablet Take 1 tablet by mouth 2 times daily (with meals) 20 tablet 0    [DISCONTINUED] methocarbamol (ROBAXIN-750) 750 MG tablet Take 1 tablet by mouth 4 times daily for 10 days 40 tablet 0    [DISCONTINUED] rosuvastatin (CRESTOR) 5 MG tablet Take 5 mg by mouth daily      [DISCONTINUED] ranitidine (ZANTAC) 150 MG tablet Take 150 mg by mouth 2 times daily      [DISCONTINUED] Mirabegron (MYRBETRIQ PO) Take 25 mg by mouth daily       [DISCONTINUED] atenolol (TENORMIN) 25 MG tablet Take 12.5 mg by mouth nightly Pt takes at bedtime. No facility-administered encounter medications on file as of 7/14/2020. Objective:    Physical Exam:  Vitals:   /74 (Site: Left Lower Arm, Position: Sitting, Cuff Size: Medium Adult)   Pulse (!) 49   Resp 18   Ht 5' 5\" (1.651 m)   Wt 212 lb (96.2 kg)   BMI 35.28 kg/m²   Last 3 weights: Wt Readings from Last 3 Encounters:   07/14/20 212 lb (96.2 kg)   07/13/20 209 lb (94.8 kg)   04/27/20 292 lb (132.5 kg)     Body mass index is 35.28 kg/m².     Physical Examination:   General appearance - alert, well appearing, and in no distress  Mental status - alert, oriented to person, place, and time  Eyes - pupils equal and reactive, extraocular eye movements intact  Ears - bilateral  external ear canals normal  Nose - normal and patent, no erythema, discharge or polyps, deviated nasal septum to Left  Mouth - mucous membranes moist, pharynx normal without lesions, small oropharynx, Mallampati 1  Neck - supple, no significant adenopathy, short and thick neck  Chest - clear to Final   08/23/2017 130 (H) 65 - 105 mg/dL Final   08/08/2013 111 (H) 65 - 105 mg/dL Final     Glycosylated hemoglobin A1C:   Hemoglobin A1C   Date Value Ref Range Status   05/30/2020 5.0 4.0 - 6.0 % Final       Pulmonary Functions Testing Results:    3/24/2015: FEV1 2.92  119%, FVC 3.48 105 %, NAC1CVA 113%, TLC 5.15 96.7%, DLCO 20.78 106%    Blood Gases:   pH   Date Value Ref Range Status   08/06/2013 7.36  Final     PCO2   Date Value Ref Range Status   08/06/2013 42 mm Hg Final     PO2   Date Value Ref Range Status   08/06/2013 99 mm Hg Final     HCO3   Date Value Ref Range Status   08/06/2013 24.0 23 - 31 mmol/L Final     O2 Sat   Date Value Ref Range Status   08/06/2013 97 % Final       Radiological reports:    CXR   2/28/17  Lung parenchyma is clear without focal   airspace consolidation, sizeable pleural effusion, or pneumothorax       2/9/2016  Sternotomy wires are present. The heart appears normal size. Pulmonary vasculature is unremarkable. The lungs are clear. No free air. No displaced rib fractures are seen. CT A Scans chest 2/28/17  FINDINGS:     No evidence of pulmonary emboli.  No hilar or mediastinal masses are seen.  Thoracic aorta without acute abnormality.  No evidence of aneurysm or dissection. Lungs are free of consolidation.  No pneumothorax or pleural effusion.  No mass lesions are present. Minimal atelectasis at the left lung base. No acute findings in the upper abdomen. Diffuse fatty infiltration of the liver. Impression:     No significant, acute cardiopulmonary abnormalities.  No evidence of pulmonary embolus. Sleep Study 4/6/2015  Mild to Moderate MARSHAL with AHI of 14.4 and RDI of 15.1 and CPAP titration to cpap of 10 cm     Compliance data  03/31/20 to 6/28/20  Compliance 100%  Average usage 9 hours and 10 minutes  AHI 4.1  Auto CPAP at 6/10 cm    Assessment:    ICD-10-CM    1. MARSHAL on CPAP  G47.33     Z99.89    2. Cough variant asthma  J45.991    3.  Allergic rhinitis, unspecified seasonality, unspecified trigger  J30.9        Plan: On nexium daily per GI and follow up GI at [facility]  Avoid allergens and exposure  Continue to observe off bronchodilators. Refused flu vaccine  Uptodate on Pneumonia vaccine by PCP and had 5 years ago    Continue Auto cpap with P min of 6 and P max of 10 cm  Patient to call for Rx for cpap supplies if needed. CPAP at least 4 hrs qhs  Wt loss is recommended and discussed  Follow good sleep hygeine instructions  Use humidifier     Questions answered pertaining to diagnosis and management explained importance of compliance with therapy   Compliance data seen and  pt is compliant per insurance requirements     RTC in 1 year    Please note that this chart was generated using voice recognition Dragon dictation software. Although every effort was made to ensure the accuracy of this automated transcription, some errors in transcription may have occurred.     Dani Velázquez MD             7/14/2020, 10:52 AM

## 2020-07-19 ENCOUNTER — HOSPITAL ENCOUNTER (OUTPATIENT)
Dept: PREADMISSION TESTING | Age: 65
Setting detail: SPECIMEN
Discharge: HOME | End: 2020-07-23
Payer: COMMERCIAL

## 2020-07-19 LAB
SARS-COV-2, PCR: NORMAL
SARS-COV-2, RAPID: NORMAL
SARS-COV-2: NOT DETECTED
SOURCE: NORMAL

## 2020-07-19 PROCEDURE — U0003 INFECTIOUS AGENT DETECTION BY NUCLEIC ACID (DNA OR RNA); SEVERE ACUTE RESPIRATORY SYNDROME CORONAVIRUS 2 (SARS-COV-2) (CORONAVIRUS DISEASE [COVID-19]), AMPLIFIED PROBE TECHNIQUE, MAKING USE OF HIGH THROUGHPUT TECHNOLOGIES AS DESCRIBED BY CMS-2020-01-R: HCPCS

## 2020-07-20 ENCOUNTER — TELEPHONE (OUTPATIENT)
Dept: FAMILY MEDICINE CLINIC | Age: 65
End: 2020-07-20

## 2020-07-23 ENCOUNTER — APPOINTMENT (OUTPATIENT)
Dept: GENERAL RADIOLOGY | Age: 65
End: 2020-07-23
Attending: ANESTHESIOLOGY
Payer: COMMERCIAL

## 2020-07-23 ENCOUNTER — HOSPITAL ENCOUNTER (OUTPATIENT)
Age: 65
Setting detail: OUTPATIENT SURGERY
Discharge: HOME | End: 2020-07-23
Attending: ANESTHESIOLOGY | Admitting: ANESTHESIOLOGY
Payer: COMMERCIAL

## 2020-07-23 VITALS
SYSTOLIC BLOOD PRESSURE: 115 MMHG | OXYGEN SATURATION: 98 % | BODY MASS INDEX: 34.49 KG/M2 | HEIGHT: 65 IN | RESPIRATION RATE: 18 BRPM | DIASTOLIC BLOOD PRESSURE: 68 MMHG | HEART RATE: 46 BPM | TEMPERATURE: 97 F | WEIGHT: 207 LBS

## 2020-07-23 PROCEDURE — 2500000003 HC RX 250 WO HCPCS: Performed by: ANESTHESIOLOGY

## 2020-07-23 PROCEDURE — 7100000010 HC PHASE II RECOVERY - FIRST 15 MIN: Performed by: ANESTHESIOLOGY

## 2020-07-23 PROCEDURE — 7100000011 HC PHASE II RECOVERY - ADDTL 15 MIN: Performed by: ANESTHESIOLOGY

## 2020-07-23 PROCEDURE — 6360000002 HC RX W HCPCS: Performed by: ANESTHESIOLOGY

## 2020-07-23 PROCEDURE — 2580000003 HC RX 258: Performed by: ANESTHESIOLOGY

## 2020-07-23 PROCEDURE — 3600000050 HC PAIN LEVEL 1 BASE: Performed by: ANESTHESIOLOGY

## 2020-07-23 PROCEDURE — 99152 MOD SED SAME PHYS/QHP 5/>YRS: CPT | Performed by: ANESTHESIOLOGY

## 2020-07-23 PROCEDURE — 6360000004 HC RX CONTRAST MEDICATION: Performed by: ANESTHESIOLOGY

## 2020-07-23 PROCEDURE — 2709999900 HC NON-CHARGEABLE SUPPLY: Performed by: ANESTHESIOLOGY

## 2020-07-23 PROCEDURE — 3209999900 FLUORO FOR SURGICAL PROCEDURES

## 2020-07-23 RX ORDER — MIDAZOLAM HYDROCHLORIDE 1 MG/ML
INJECTION INTRAMUSCULAR; INTRAVENOUS PRN
Status: DISCONTINUED | OUTPATIENT
Start: 2020-07-23 | End: 2020-07-23 | Stop reason: ALTCHOICE

## 2020-07-23 RX ORDER — SODIUM CHLORIDE, SODIUM LACTATE, POTASSIUM CHLORIDE, CALCIUM CHLORIDE 600; 310; 30; 20 MG/100ML; MG/100ML; MG/100ML; MG/100ML
INJECTION, SOLUTION INTRAVENOUS CONTINUOUS
Status: DISCONTINUED | OUTPATIENT
Start: 2020-07-23 | End: 2020-07-23 | Stop reason: HOSPADM

## 2020-07-23 RX ORDER — DIPHENHYDRAMINE HYDROCHLORIDE 50 MG/ML
25 INJECTION INTRAMUSCULAR; INTRAVENOUS ONCE
Status: COMPLETED | OUTPATIENT
Start: 2020-07-23 | End: 2020-07-23

## 2020-07-23 RX ADMIN — SODIUM CHLORIDE, POTASSIUM CHLORIDE, SODIUM LACTATE AND CALCIUM CHLORIDE: 600; 310; 30; 20 INJECTION, SOLUTION INTRAVENOUS at 07:53

## 2020-07-23 RX ADMIN — DIPHENHYDRAMINE HYDROCHLORIDE 25 MG: 50 INJECTION, SOLUTION INTRAMUSCULAR; INTRAVENOUS at 08:01

## 2020-07-23 ASSESSMENT — PAIN SCALES - GENERAL
PAINLEVEL_OUTOF10: 0

## 2020-07-23 ASSESSMENT — PAIN - FUNCTIONAL ASSESSMENT: PAIN_FUNCTIONAL_ASSESSMENT: 0-10

## 2020-07-23 NOTE — PROGRESS NOTES
Pt denies any allergy to prednisone in preop. States having no issues when having knees or spine injected with it.

## 2020-07-23 NOTE — H&P
History and Physical Update    Pt Name: Martell Nevarez  MRN: [id number]  YOB: 1955  Date of evaluation: 7/23/2020      [x] I have reviewed the [address] OF 6/24/2020 which meets the criteria for an Interval History and Physical note AND WILL BE SCANNED INTO THE ELECTRONIC HEALTH RECORD. .    [x] I have examined  Martell Nevarez  There are no changes to the patient who is scheduled for a L 4 EPIDURAL STEROID INJECTION BILATERALLY  The patient denies health changes, fever, chills, productive cough, SOB, chest pain, open sores or wounds. SHE STATES THAT SHE IS NO LONGER DIABETIC SINCE LOSING 150#. SHE TAKES ASA 81 MG AND TOOK IT LAST NIGHT. Vital signs: /69   Pulse 59   Temp 96.4 °F (35.8 °C) (Temporal)   Resp 16   Ht 5' 5\" (1.651 m)   Wt 207 lb (93.9 kg)   SpO2 98%   BMI 34.45 kg/m²     Allergies:  Biaxin [clarithromycin]; Ceclor [cefaclor]; Duricef [cefadroxil]; Medrol [methylprednisolone]; Penicillins; Percocet [oxycodone-acetaminophen]; Prednisone; Albuterol; Hydrocodone-acetaminophen; Isoniazid; Morphine; Nickel; Norco [hydrocodone-acetaminophen]; Other; Paregoric; and Fentanyl    Medications:    Prior to Admission medications    Medication Sig Start Date End Date Taking? Authorizing Provider   famotidine (PEPCID) 40 MG tablet 2 times daily    Yes Historical Provider, MD   Cyanocobalamin (VITAMIN B-12) 50 MCG LOZG as directed   Yes Historical Provider, MD   Prenatal Vit-Fe Fumarate-FA (PRENATAL VITAMIN PO) Take 1 tablet by mouth daily 1/15/20  Yes Historical Provider, MD   Cholecalciferol (VITAMIN D3) 5000 units TABS Take 5,000 Units by mouth daily    Yes Historical Provider, MD   bumetanide (BUMEX) 1 MG tablet Take 1 mg by mouth three times a week Every Monday Wednesday and friday    Yes Historical Provider, MD   gabapentin (NEURONTIN) 100 MG capsule Take 100 mg by mouth 3 times daily.  Oscar North Historical Provider, MD   esomeprazole ([employer]) 24.65 MG CPDR capsule Take 1 capsule by mouth daily    Yes Historical Provider, MD   spironolactone (ALDACTONE) 50 MG tablet Take 50 mg by mouth daily    Yes Historical Provider, MD   levothyroxine (SYNTHROID) 25 MCG tablet Take 50 mcg by mouth Daily Take 50mcg Daily   Yes Historical Provider, MD   LORazepam (ATIVAN) 0.5 MG tablet Take 0.5 mg by mouth 2 times daily as needed. .   Yes Historical Provider, MD   aspirin 81 MG EC tablet Take 1 tablet by mouth daily. 8/8/13  Yes Oni Fajardo MD   Omega-3 Fatty Acids (OMEGA 3 PO) Take by mouth 2 times daily    Historical Provider, MD         This is a 60 y.o. female who is pleasant, cooperative, alert and oriented x3, in no acute distress. Heart: Heart sounds are normal.  HR regular rate and rhythm without murmur, gallop or rub. Lungs: Normal respiratory effort with equal expansion, good air exchange, unlabored and clear to auscultation without wheezes or rales bilaterally   Abdomen: soft, nontender, nondistended with bowel sounds   PEDAL PULSES + 2 BILATERALLY NO CALF TENDERNESS NO EDEMA.        Labs:  Recent Labs     07/13/20  1205   HGB 14.1   HCT 44.5   WBC 7.2   MCV 96.9         K 4.1      CO2 24   BUN 25*   CREATININE 0.80   GLUCOSE 119*   AST 43*   ALT 44*   LABALBU 4.3       Recent Labs     07/19/20  0810   COVID19       Not Detected             MERCEDES DELGADO  APRN, ACNP-BC  Electronically signed 7/23/2020 at 8:16 AM

## 2020-08-21 ENCOUNTER — HOSPITAL ENCOUNTER (EMERGENCY)
Age: 65
Discharge: HOME | End: 2020-08-21
Attending: EMERGENCY MEDICINE
Payer: COMMERCIAL

## 2020-08-21 ENCOUNTER — APPOINTMENT (OUTPATIENT)
Dept: GENERAL RADIOLOGY | Age: 65
End: 2020-08-21
Payer: COMMERCIAL

## 2020-08-21 VITALS
TEMPERATURE: 98.4 F | RESPIRATION RATE: 14 BRPM | OXYGEN SATURATION: 98 % | HEIGHT: 65 IN | HEART RATE: 54 BPM | DIASTOLIC BLOOD PRESSURE: 78 MMHG | SYSTOLIC BLOOD PRESSURE: 116 MMHG | BODY MASS INDEX: 35.02 KG/M2 | WEIGHT: 210.2 LBS

## 2020-08-21 PROCEDURE — 73030 X-RAY EXAM OF SHOULDER: CPT

## 2020-08-21 PROCEDURE — 73110 X-RAY EXAM OF WRIST: CPT

## 2020-08-21 PROCEDURE — 99284 EMERGENCY DEPT VISIT MOD MDM: CPT

## 2020-08-21 ASSESSMENT — ENCOUNTER SYMPTOMS
SHORTNESS OF BREATH: 0
EYE REDNESS: 0
COLOR CHANGE: 0
DIARRHEA: 0
VOMITING: 0
CONSTIPATION: 0
EYE DISCHARGE: 0
COUGH: 0
FACIAL SWELLING: 0
ABDOMINAL PAIN: 0

## 2020-08-21 ASSESSMENT — PAIN SCALES - GENERAL: PAINLEVEL_OUTOF10: 6

## 2020-08-21 NOTE — ED PROVIDER NOTES
[address] [place] [address] ED  EMERGENCY DEPARTMENT ENCOUNTER      Pt Name: Fran Dominguez  MRN: [id number]  [place] 1955  Date of evaluation: 8/21/2020  Provider: Sirena Sagastume MD    [address] [place] [address]       Chief Complaint   Patient presents with    Fall    Wrist Pain    Shoulder Pain         HISTORY OF PRESENT ILLNESS  (Location/Symptom, Timing/Onset, Context/Setting, Quality, Duration, Modifying Factors, Severity.)   Fran Dominguez is a 60 y.o. female who presents to the emergency department for pain in her left shoulder and left wrist.  1 hour ago she slipped and fell and landed on these 2 areas. She has pain in the posterior left shoulder and ulnar side of her left wrist.  She did not strike her head. No other injuries. She rated the pain as a 6. No chest pain fever cough or shortness of breath. Nursing Notes were reviewed. ALLERGIES     Biaxin [clarithromycin]; Ceclor [cefaclor]; Duricef [cefadroxil]; Medrol [methylprednisolone]; Penicillins; Percocet [oxycodone-acetaminophen]; Prednisone; Albuterol; Hydrocodone-acetaminophen; Isoniazid; Morphine; Nickel; Norco [hydrocodone-acetaminophen]; Other; Paregoric; and Fentanyl    CURRENT MEDICATIONS       Previous Medications    ASPIRIN 81 MG EC TABLET    Take 1 tablet by mouth daily. BUMETANIDE (BUMEX) 1 MG TABLET    Take 1 mg by mouth three times a week Every Monday Wednesday and friday     CHOLECALCIFEROL (VITAMIN D3) 5000 UNITS TABS    Take 5,000 Units by mouth daily     CYANOCOBALAMIN (VITAMIN B-12) 50 MCG LOZG    as directed    ESOMEPRAZOLE (NEXIUM) 24.65 MG CPDR CAPSULE    Take 1 capsule by mouth daily     FAMOTIDINE (PEPCID) 40 MG TABLET    2 times daily     GABAPENTIN (NEURONTIN) 100 MG CAPSULE    Take 100 mg by mouth 3 times daily. Deshawn Zaragoza LEVOTHYROXINE (SYNTHROID) 25 MCG TABLET    Take 50 mcg by mouth Daily Take 50mcg Daily    LORAZEPAM (ATIVAN) 0.5 MG TABLET    Take 0.5 mg by mouth 2 times daily as needed.  .    OMEGA-3 FATTY ACIDS (OMEGA 3 PO) INSJ IO LENS PROSTH W/O ECP Left 2018    EYE CATARACT EMULSIFICATION IOL IMPLANT performed by Marge Dobbs MD at [address] [place] [address] Left     TONSILLECTOMY      TOTAL KNEE ARTHROPLASTY Left     TOTAL KNEE ARTHROPLASTY Right 2018    ULNAR TUNNEL RELEASE Right     VARICOSE VEIN SURGERY      bilateral         FAMILY HISTORY           Problem Relation Age of Onset    Heart Disease Father     Cancer Father         lung    Heart Disease Mother         5 stents placed    Diabetes Mother     Diabetes Sister     Rheum Arthritis Sister      Family Status   Relation Name Status    Father          lung cancer    Mother      Sister  Alive    Sister  Alive    Sister  Alive    Sister  Alive    Brother   at age less than hour old   Alcira Bruce Sister  (Not Specified)    Sister  (Not Specified)        SOCIAL HISTORY      reports that she quit smoking about 39 years ago. Her smoking use included cigarettes. She started smoking about 51 years ago. She has a 24.00 pack-year smoking history. She has never used smokeless tobacco. She reports that she does not drink alcohol or use drugs. REVIEW OF SYSTEMS    (2-9 systems for level 4, 10 or more for level 5)     Review of Systems   Constitutional: Negative for chills, fatigue and fever. HENT: Negative for congestion, ear discharge and facial swelling. Eyes: Negative for discharge and redness. Respiratory: Negative for cough and shortness of breath. Cardiovascular: Negative for chest pain. Gastrointestinal: Negative for abdominal pain, constipation, diarrhea and vomiting. Genitourinary: Negative for dysuria and hematuria. Musculoskeletal: Negative for arthralgias. Skin: Negative for color change and rash. Neurological: Negative for syncope, numbness and headaches. Hematological: Negative for adenopathy. Psychiatric/Behavioral: Negative for confusion. The patient is not nervous/anxious.          Except as noted above the remainder of the review of systems was reviewed and negative. PHYSICAL EXAM    (up to 7 for level 4, 8 or more for level 5)     Vitals:    08/21/20 1724 08/21/20 1726   BP:  116/78   Pulse:  54   Resp:  14   Temp:  98.4 °F (36.9 °C)   Weight: 210 lb 3.2 oz (95.3 kg)    Height: 5' 5\" (1.651 m)        Physical Exam  Constitutional:       General: She is not in acute distress. Appearance: She is well-developed. She is not diaphoretic. HENT:      Head: Normocephalic and atraumatic. Eyes:      General: No scleral icterus. Right eye: No discharge. Left eye: No discharge. Neck:      Musculoskeletal: Neck supple. Cardiovascular:      Rate and Rhythm: Normal rate and regular rhythm. Pulmonary:      Effort: Pulmonary effort is normal. No respiratory distress. Breath sounds: Normal breath sounds. No stridor. No wheezing or rales. Abdominal:      General: There is no distension. Palpations: Abdomen is soft. Tenderness: There is no abdominal tenderness. Musculoskeletal: Normal range of motion. Comments: She has some tenderness posterior left shoulder and ulnar side of the left wrist.  Skin intact. No deformity. Left elbow nontender. Lymphadenopathy:      Cervical: No cervical adenopathy. Skin:     General: Skin is warm and dry. Findings: No erythema or rash. Neurological:      Mental Status: She is alert and oriented to person, place, and time.    Psychiatric:         Behavior: Behavior normal.             DIAGNOSTIC RESULTS     EKG: All EKG's are interpreted by the Emergency Department Physician who either signs or Co-signs this chart in the absence of a cardiologist.    Not indicated    RADIOLOGY:   Non-plain film images such as CT, Ultrasound and MRI are read by the radiologist. Plain radiographic images are visualized and preliminarily interpreted by the emergency physician with the below findings:    Interpretation per the Radiologist below, if available at the time of this note:    Xr Wrist Left (min 3 Views)    Result Date: 8/21/2020  EXAMINATION: 4 XRAY VIEWS OF THE LEFT WRIST; TWO XRAY VIEWS OF THE LEFT SHOULDER 8/21/2020 5:32 pm COMPARISON: None. HISTORY: ORDERING SYSTEM PROVIDED HISTORY: Pain TECHNOLOGIST PROVIDED HISTORY: Pain Reason for Exam: Lt wrist pain Acuity: Acute Type of Exam: Initial Mechanism of Injury: fell; ORDERING SYSTEM PROVIDED HISTORY: PAIN TECHNOLOGIST PROVIDED HISTORY: PAIN Reason for Exam: Lt shoulder pain Acuity: Acute Type of Exam: Initial Mechanism of Injury: fell FINDINGS: Left wrist, four views: No acute fracture or dislocation. Carpal alignment is maintained. There are osteoarthritic changes at the triscaphe joint and 1st carpal/metacarpal joint. Mild degenerative changes also present at the radial carpal articulation. No focal soft tissue abnormality. Left shoulder, three views: No acute fracture or dislocation. Previous resection of the distal clavicle. Mild degenerative change at the glenohumeral articulation. No periarticular soft tissue calcification. The visualized left hemithorax is unremarkable. No acute osseous abnormality of the left wrist.  Degenerative changes as outlined above. No acute osseous abnormality of the left shoulder. Mild glenohumeral osteoarthritic changes. Xr Shoulder Left (min 2 Views)    Result Date: 8/21/2020  EXAMINATION: 4 XRAY VIEWS OF THE LEFT WRIST; TWO XRAY VIEWS OF THE LEFT SHOULDER 8/21/2020 5:32 pm COMPARISON: None. HISTORY: ORDERING SYSTEM PROVIDED HISTORY: Pain TECHNOLOGIST PROVIDED HISTORY: Pain Reason for Exam: Lt wrist pain Acuity: Acute Type of Exam: Initial Mechanism of Injury: fell; ORDERING SYSTEM PROVIDED HISTORY: PAIN TECHNOLOGIST PROVIDED HISTORY: PAIN Reason for Exam: Lt shoulder pain Acuity: Acute Type of Exam: Initial Mechanism of Injury: fell FINDINGS: Left wrist, four views: No acute fracture or dislocation. Carpal alignment is maintained.   There are to edit the dictations but occasionally words are mis-transcribed.)    Karime Doll MD  Attending Emergency Physician            Karime Doll MD  08/21/20 [id number]

## 2020-08-27 ENCOUNTER — HOSPITAL ENCOUNTER (OUTPATIENT)
Dept: MAMMOGRAPHY | Age: 65
Discharge: HOME | End: 2020-08-29
Payer: COMMERCIAL

## 2020-08-27 ENCOUNTER — HOSPITAL ENCOUNTER (OUTPATIENT)
Age: 65
Discharge: HOME | End: 2020-08-27
Payer: COMMERCIAL

## 2020-08-27 LAB
CALCIUM SERPL-MCNC: 9.1 MG/DL (ref 8.6–10.4)
POTASSIUM SERPL-SCNC: 3.9 MMOL/L (ref 3.7–5.3)
THYROXINE, FREE: 1.18 NG/DL (ref 0.93–1.7)
TSH SERPL DL<=0.05 MIU/L-ACNC: 0.7 MIU/L (ref 0.3–5)
VITAMIN D 25-HYDROXY: 54.1 NG/ML (ref 30–100)

## 2020-08-27 PROCEDURE — 84439 ASSAY OF FREE THYROXINE: CPT

## 2020-08-27 PROCEDURE — 83036 HEMOGLOBIN GLYCOSYLATED A1C: CPT

## 2020-08-27 PROCEDURE — 77080 DXA BONE DENSITY AXIAL: CPT

## 2020-08-27 PROCEDURE — 82310 ASSAY OF CALCIUM: CPT

## 2020-08-27 PROCEDURE — 82306 VITAMIN D 25 HYDROXY: CPT

## 2020-08-27 PROCEDURE — 84443 ASSAY THYROID STIM HORMONE: CPT

## 2020-08-27 PROCEDURE — 36415 COLL VENOUS BLD VENIPUNCTURE: CPT

## 2020-08-27 PROCEDURE — 84132 ASSAY OF SERUM POTASSIUM: CPT

## 2020-08-28 LAB
ESTIMATED AVERAGE GLUCOSE: 94 MG/DL
HBA1C MFR BLD: 4.9 % (ref 4–6)

## 2020-09-14 ENCOUNTER — HOSPITAL ENCOUNTER (OUTPATIENT)
Age: 65
Setting detail: OUTPATIENT SURGERY
Discharge: HOME | End: 2020-09-14
Attending: ANESTHESIOLOGY | Admitting: ANESTHESIOLOGY
Payer: COMMERCIAL

## 2020-09-14 ENCOUNTER — APPOINTMENT (OUTPATIENT)
Dept: GENERAL RADIOLOGY | Age: 65
End: 2020-09-14
Attending: ANESTHESIOLOGY
Payer: COMMERCIAL

## 2020-09-14 VITALS
BODY MASS INDEX: 33.66 KG/M2 | WEIGHT: 202 LBS | HEART RATE: 56 BPM | RESPIRATION RATE: 14 BRPM | DIASTOLIC BLOOD PRESSURE: 43 MMHG | HEIGHT: 65 IN | OXYGEN SATURATION: 96 % | SYSTOLIC BLOOD PRESSURE: 123 MMHG | TEMPERATURE: 97.5 F

## 2020-09-14 PROCEDURE — 2580000003 HC RX 258: Performed by: ANESTHESIOLOGY

## 2020-09-14 PROCEDURE — 7100000010 HC PHASE II RECOVERY - FIRST 15 MIN: Performed by: ANESTHESIOLOGY

## 2020-09-14 PROCEDURE — 2500000003 HC RX 250 WO HCPCS: Performed by: ANESTHESIOLOGY

## 2020-09-14 PROCEDURE — 99152 MOD SED SAME PHYS/QHP 5/>YRS: CPT | Performed by: ANESTHESIOLOGY

## 2020-09-14 PROCEDURE — 2709999900 HC NON-CHARGEABLE SUPPLY: Performed by: ANESTHESIOLOGY

## 2020-09-14 PROCEDURE — 3600000050 HC PAIN LEVEL 1 BASE: Performed by: ANESTHESIOLOGY

## 2020-09-14 PROCEDURE — 3600000051 HC PAIN LEVEL 1 ADDL 15 MIN: Performed by: ANESTHESIOLOGY

## 2020-09-14 PROCEDURE — 6360000004 HC RX CONTRAST MEDICATION: Performed by: ANESTHESIOLOGY

## 2020-09-14 PROCEDURE — 3209999900 FLUORO FOR SURGICAL PROCEDURES

## 2020-09-14 PROCEDURE — 7100000011 HC PHASE II RECOVERY - ADDTL 15 MIN: Performed by: ANESTHESIOLOGY

## 2020-09-14 PROCEDURE — 6360000002 HC RX W HCPCS: Performed by: ANESTHESIOLOGY

## 2020-09-14 RX ORDER — BUPIVACAINE HYDROCHLORIDE 2.5 MG/ML
INJECTION, SOLUTION EPIDURAL; INFILTRATION; INTRACAUDAL PRN
Status: DISCONTINUED | OUTPATIENT
Start: 2020-09-14 | End: 2020-09-14 | Stop reason: ALTCHOICE

## 2020-09-14 RX ORDER — MIDAZOLAM HYDROCHLORIDE 1 MG/ML
INJECTION INTRAMUSCULAR; INTRAVENOUS PRN
Status: DISCONTINUED | OUTPATIENT
Start: 2020-09-14 | End: 2020-09-14 | Stop reason: ALTCHOICE

## 2020-09-14 RX ORDER — SODIUM CHLORIDE, SODIUM LACTATE, POTASSIUM CHLORIDE, CALCIUM CHLORIDE 600; 310; 30; 20 MG/100ML; MG/100ML; MG/100ML; MG/100ML
INJECTION, SOLUTION INTRAVENOUS CONTINUOUS
Status: DISCONTINUED | OUTPATIENT
Start: 2020-09-14 | End: 2020-09-14 | Stop reason: HOSPADM

## 2020-09-14 RX ADMIN — SODIUM CHLORIDE, POTASSIUM CHLORIDE, SODIUM LACTATE AND CALCIUM CHLORIDE: 600; 310; 30; 20 INJECTION, SOLUTION INTRAVENOUS at 06:43

## 2020-09-14 ASSESSMENT — PAIN SCALES - GENERAL
PAINLEVEL_OUTOF10: 0

## 2020-09-14 ASSESSMENT — PAIN - FUNCTIONAL ASSESSMENT
PAIN_FUNCTIONAL_ASSESSMENT: PREVENTS OR INTERFERES SOME ACTIVE ACTIVITIES AND ADLS
PAIN_FUNCTIONAL_ASSESSMENT: 0-10

## 2020-09-14 ASSESSMENT — PAIN DESCRIPTION - DESCRIPTORS: DESCRIPTORS: ACHING

## 2020-09-14 NOTE — H&P
History and Physical Service   [facility] - INPATIENT    HISTORY AND PHYSICAL EXAMINATION            Date of Evaluation: 9/14/2020  Patient name:  Mayito Merritt  MRN:   [id number]  YOB: 1955  PCP:    Joseph Omer MD    History Obtained From:     Patient, medical records    History of Present Illness: This is Mayito Merritt a 60 y.o. female who presents today for a bilateral L4 tansforaminal stenosis by Dr. Dennise Santana for ryan mbar spinal stenosis. Patient with chronic lower back pain several years c/o 5/10 constant aching burning pain that is intermittently sharp, stabbing. Pain is aggravated by movement. She follows with Dr Claude Delgado for pain management with previous lumbar and SI bilateral IRVIN's. Last IRVIN L4 7/23/20 with improved comfort. Today returns for procedure. Denies bowel or bladder incontinence, weakness in lower legs, fever, chills, open sores or wounds. Hx Asthma controlled with medication and wears CPAP regularly for MARSHAL. Hx CAD for which she follows with cardiologist, Dr Heidi Kebede and denies dyspnea at rest, palpitations, dizziness, lightheadedness or chest pain.   Last ASA 81mg 9/12/20    Past Medical History:     Past Medical History:   Diagnosis Date    Allergic rhinitis     Arthritis     Asthma     Bunion of left foot     CAD (coronary artery disease) 8/6/13    cabg x 2    Chlorine inhalation lung injury ([facility] [address]     Severe irritability and airway reactivity     Chronic back pain     Cough variant asthma     Dyslipidemia     Dyspnea on exertion     Eczema     GERD (gastroesophageal reflux disease)     Headache     MIGRAINES    MVP (mitral valve prolapse)     Obesity     Morbid obesity with BMI of 50.0-59.9, adult    MARSHAL on CPAP     Poison ivy     States in lungs    Sleep apnea     COMPLIANT ON CPAP     Thoracic outlet syndrome     bilateral    Thyroid disease         Past Surgical History:     Past Surgical History:   Procedure Laterality Date    BACK SURGERY      lumbar fusion    CARDIAC CATHETERIZATION      CARPAL TUNNEL RELEASE Right     CATARACT REMOVAL WITH IMPLANT Right 08/07/2018    [facility]    CATARACT REMOVAL WITH IMPLANT Left 08/28/2018    [facility]    CHOLECYSTECTOMY      CORONARY ARTERY BYPASS GRAFT  8/6/13    cabg x 2     CYSTOSCOPY  04/10/2018    FIRST RIB REMOVAL      bilateral    HC INJECTION PROCEDURE FOR SACROILIAC JOINT Bilateral 11/22/2019    SACROILIAC JOINT INJECTION BILATERAL performed by [facility]iana Saldana MD at [facility] Right     PAIN MANAGEMENT PROCEDURE Bilateral 7/23/2020    BILATERAL L4 TFE performed by Mariana Saldana MD at [address] [place] [address] 4/10/2018    CYSTOSCOPY performed by Selma Calvin MD at [facility] W/O ECP Right 8/7/2018    EYE CATARACT EMULSIFICATION IOL IMPLANT performed by Santiago San MD at [facility] IO LENS PROSTH W/O ECP Left 8/28/2018    EYE CATARACT EMULSIFICATION IOL IMPLANT performed by Santiago San MD at [address] [place] [address] Left     TONSILLECTOMY      TOTAL KNEE ARTHROPLASTY Left     TOTAL KNEE ARTHROPLASTY Right 03/2018    ULNAR TUNNEL RELEASE Right     VARICOSE VEIN SURGERY      bilateral        Medications Prior to Admission:     Prior to Admission medications    Medication Sig Start Date End Date Taking?  Authorizing Provider   famotidine (PEPCID) 40 MG tablet 2 times daily    Yes Historical Provider, MD   Cyanocobalamin (VITAMIN B-12) 50 MCG LOZG as directed   Yes Historical Provider, MD   Prenatal Vit-Fe Fumarate-FA (PRENATAL VITAMIN PO) Take 1 tablet by mouth daily 1/15/20  Yes Historical Provider, MD   Cholecalciferol (VITAMIN D3) 5000 units TABS Take 5,000 Units by mouth daily    Yes Historical Provider, MD   bumetanide (BUMEX) 1 MG tablet Take 1 mg by mouth three times a week Every Monday Wednesday and friday Yes Historical Provider, MD   gabapentin (NEURONTIN) 100 MG capsule Take 100 mg by mouth 3 times daily. .   Yes Historical Provider, MD   esomeprazole (NEXIUM) 24.65 MG CPDR capsule Take 1 capsule by mouth daily    Yes Historical Provider, MD   spironolactone (ALDACTONE) 50 MG tablet Take 50 mg by mouth daily    Yes Historical Provider, MD   levothyroxine (SYNTHROID) 25 MCG tablet Take 50 mcg by mouth Daily Take 50mcg Daily   Yes Historical Provider, MD   LORazepam (ATIVAN) 0.5 MG tablet Take 0.5 mg by mouth 2 times daily as needed. .   Yes Historical Provider, MD   aspirin 81 MG EC tablet Take 1 tablet by mouth daily. 8/8/13   Juju Darling MD        Allergies:     Biaxin [clarithromycin]; Ceclor [cefaclor]; Duricef [cefadroxil]; Medrol [methylprednisolone]; Penicillins; Percocet [oxycodone-acetaminophen]; Prednisone; Albuterol; Hydrocodone-acetaminophen; Isoniazid; Morphine; Nickel; Norco [hydrocodone-acetaminophen]; Other; Paregoric; and Fentanyl    Social History:     Tobacco:    reports that she quit smoking about 39 years ago. Her smoking use included cigarettes. She started smoking about 51 years ago. She has a 24.00 pack-year smoking history. She has never used smokeless tobacco.  Alcohol:      reports no history of alcohol use. Drug Use:  reports no history of drug use. Family History:     Family History   Problem Relation Age of Onset    Heart Disease Father     Cancer Father         lung    Heart Disease Mother         5 stents placed    Diabetes Mother     Diabetes Sister     Rheum Arthritis Sister        Review of Systems:     Positive and Negative as described in HPI. CONSTITUTIONAL: Continued planned weight loss > 130 with goal of #30 by 1/2021 negative for fevers, chills, sweats, fatigue, weight loss  HEENT:  negative for vision, hearing changes, runny nose, throat pain  RESPIRATORY:  negative for shortness of breath, cough, congestion, wheezing.   CARDIOVASCULAR:  negative for chest pain, palpitations. GASTROINTESTINAL:  negative for nausea, vomiting, diarrhea, constipation, change in bowel habits, abdominal pain   GENITOURINARY:  negative for difficulty of urination, burning with urination, frequency   INTEGUMENT:  negative for rash, skin lesions, easy bruising   HEMATOLOGIC/LYMPHATIC:  negative for swelling/edema   ALLERGIC/IMMUNOLOGIC:  negative for urticaria , itching  ENDOCRINE:  negative increase in drinking, increase in urination, hot or cold intolerance  MUSCULOSKELETAL: See HPI  Walks 5 miles per day   negative joint pains, muscle aches, swelling of joints  NEUROLOGICAL:  negative for headaches, dizziness, lightheadedness, numbness, pain, tingling extremities  BEHAVIOR/PSYCH:  negative for depression, anxiety    Physical Exam:   /60   Pulse 52   Temp 97.3 °F (36.3 °C) (Temporal)   Resp 18   Ht 5' 5\" (1.651 m)   Wt 202 lb (91.6 kg)   SpO2 99%   BMI 33.61 kg/m²   No LMP recorded. Patient has had a hysterectomy. No obstetric history on file. No results for input(s): POCGLU in the last 72 hours. General Appearance: obese female,  alert, well appearing, and in no acute distress  Mental status: oriented to person, place, and time with normal affect  Head:  normocephalic, atraumatic. Eye: no icterus, redness, pupils equal and reactive, extraocular eye movements intact, conjunctiva clear  Ear: normal external ear, no discharge, hearing intact  Nose:  no drainage noted  Mouth: mucous membranes moist  Neck: supple, no carotid bruits, thyroid not palpable  Lungs: Bilateral equal air entry, clear to ausculation, no wheezing, rales or rhonchi, normal effort  Cardiovascular: HR 52 bradycardic rate, regular rhythm w/o symptoms , no murmur, gallop, rub.   Abdomen: Soft, nontender, nondistended, normal bowel sounds, no hepatomegaly or splenomegaly  Neurologic: There are no new focal motor or sensory deficits, normal muscle tone and bulk, no abnormal sensation, normal speech, cranial nerves II through XII grossly intact  Skin: No gross lesions, rashes, bruising or bleeding on exposed skin area  Extremities:  peripheral pulses palpable, no pedal edema or calf pain with palpation  Psych: normal affect     Investigations:      Laboratory Testing:  No results found for this or any previous visit (from the past 24 hour(s)). Recent Labs     08/27/20  1431   K 3.9       No results for input(s): COVID19 in the last 720 hours. Imaging/Diagnostics:    Diagnosis:      1. Lumbar spinal stenosis    Plans:     1.  Bilateral L4 transforaminal epidural steroid injection       LIZETTE Garcia - CNP  9/14/2020  6:50 AM

## 2020-09-14 NOTE — OP NOTE
Operative Note      Patient: Kalani December  YOB: 1955  MRN: [id number]    Date of Procedure: 9/14/2020    PREOPERATIVE DIAGNOSIS:    Spinal stenosis lumbar with neurogenic claudication    PROCEDURE:  1.  Bilateral L4 transforaminal epidural steroid block with fluoroscopy. 2.  Epidurogram.    POSTOPERATIVE DIAGNOSIS:  Same    ESTIMATED BLOOD COUNT:    None. COMPLICATIONS:    None. SPECIMENS:  None. MONITORS:  Standard ASA monitors were placed during the entire procedure and the immediate postoperative period. The patient was communicating with us throughout the whole entire procedure. PREPARATION OF THE PROCEDURE:  Oxygen saturation and vital signs were monitored continuously throughout the entire procedure in order to interact and give feedback. The x-ray technician was supervised and instructed to operate the fluoroscopy machine. PROCEDURE:  The procedure risks, hazards and alternatives were discussed with the patient and a proper consent was obtained. The patient was taken to the procedure room. The patient was placed prone on the fluoroscopy table and was monitored appropriately. The back was prepped and draped. The C-arm was brought in. The endplates at the above levels were squared off. The C-arm was rotated to appropriate side. The pedicle and the superior articular process of the above facet and the \"neck of the nicolle dog\" were all visualized. After adequate local anesthesia, a 22-gauge, 5-inch spinal needle was inserted using down-the-barrel-of-the-needle technique. The needle was advanced into the posterior aspect of the foramen and then advanced anteriorly toward the 6 o'clock position on the pedicle towards bilateral L4 neural foramina. No paresthesias were noted. Then 0.5 mL of contrast was injected and spread medially around the pedicle and into the epidural space and the nerve root was visualized.   Depo-Medrol 40 mg plus 1 mL of 0.25% preservative-free

## 2020-12-05 ENCOUNTER — HOSPITAL ENCOUNTER (OUTPATIENT)
Age: 65
Discharge: HOME | End: 2020-12-05
Payer: COMMERCIAL

## 2020-12-05 LAB
CALCIUM SERPL-MCNC: 9.6 MG/DL (ref 8.6–10.4)
POTASSIUM SERPL-SCNC: 4.1 MMOL/L (ref 3.7–5.3)
THYROXINE, FREE: 1.36 NG/DL (ref 0.93–1.7)
TSH SERPL DL<=0.05 MIU/L-ACNC: 1.78 MIU/L (ref 0.3–5)
VITAMIN D 25-HYDROXY: 58.5 NG/ML (ref 30–100)

## 2020-12-05 PROCEDURE — 82310 ASSAY OF CALCIUM: CPT

## 2020-12-05 PROCEDURE — 84132 ASSAY OF SERUM POTASSIUM: CPT

## 2020-12-05 PROCEDURE — 82306 VITAMIN D 25 HYDROXY: CPT

## 2020-12-05 PROCEDURE — 84439 ASSAY OF FREE THYROXINE: CPT

## 2020-12-05 PROCEDURE — 84443 ASSAY THYROID STIM HORMONE: CPT

## 2020-12-05 PROCEDURE — 36415 COLL VENOUS BLD VENIPUNCTURE: CPT

## 2021-01-29 ENCOUNTER — HOSPITAL ENCOUNTER (OUTPATIENT)
Age: 66
Discharge: HOME | End: 2021-01-29
Payer: MEDICARE

## 2021-01-29 LAB
T3 FREE: 2.47 PG/ML (ref 2.02–4.43)
THYROXINE, FREE: 1.12 NG/DL (ref 0.93–1.7)
TSH SERPL DL<=0.05 MIU/L-ACNC: 1.28 MIU/L (ref 0.3–5)

## 2021-01-29 PROCEDURE — 84439 ASSAY OF FREE THYROXINE: CPT

## 2021-01-29 PROCEDURE — 36415 COLL VENOUS BLD VENIPUNCTURE: CPT

## 2021-01-29 PROCEDURE — 84443 ASSAY THYROID STIM HORMONE: CPT

## 2021-01-29 PROCEDURE — 84481 FREE ASSAY (FT-3): CPT

## 2021-02-12 ENCOUNTER — TELEPHONE (OUTPATIENT)
Dept: PULMONOLOGY | Age: 66
End: 2021-02-12

## 2021-02-12 DIAGNOSIS — G47.33 OSA ON CPAP: Primary | ICD-10-CM

## 2021-02-12 DIAGNOSIS — Z99.89 OSA ON CPAP: Primary | ICD-10-CM

## 2021-02-12 NOTE — TELEPHONE ENCOUNTER
The patient called and stated that her CPAP machine is not working.  I have pend an order to faxed to [facility] to come out and fix it

## 2021-02-18 ENCOUNTER — HOSPITAL ENCOUNTER (OUTPATIENT)
Dept: ULTRASOUND IMAGING | Age: 66
Discharge: HOME | End: 2021-02-20
Payer: MEDICARE

## 2021-02-18 DIAGNOSIS — R59.0 VIRCHOW'S NODE: ICD-10-CM

## 2021-02-18 PROCEDURE — 76536 US EXAM OF HEAD AND NECK: CPT

## 2021-02-22 ENCOUNTER — HOSPITAL ENCOUNTER (OUTPATIENT)
Age: 66
Setting detail: OUTPATIENT SURGERY
Discharge: HOME | End: 2021-02-22
Attending: ANESTHESIOLOGY | Admitting: ANESTHESIOLOGY
Payer: MEDICARE

## 2021-02-22 ENCOUNTER — APPOINTMENT (OUTPATIENT)
Dept: GENERAL RADIOLOGY | Age: 66
End: 2021-02-22
Attending: ANESTHESIOLOGY
Payer: MEDICARE

## 2021-02-22 VITALS
BODY MASS INDEX: 31.65 KG/M2 | SYSTOLIC BLOOD PRESSURE: 131 MMHG | OXYGEN SATURATION: 98 % | HEIGHT: 65 IN | HEART RATE: 64 BPM | DIASTOLIC BLOOD PRESSURE: 52 MMHG | TEMPERATURE: 97.3 F | RESPIRATION RATE: 16 BRPM | WEIGHT: 190 LBS

## 2021-02-22 PROCEDURE — 7100000011 HC PHASE II RECOVERY - ADDTL 15 MIN: Performed by: ANESTHESIOLOGY

## 2021-02-22 PROCEDURE — 99152 MOD SED SAME PHYS/QHP 5/>YRS: CPT | Performed by: ANESTHESIOLOGY

## 2021-02-22 PROCEDURE — 2709999900 HC NON-CHARGEABLE SUPPLY: Performed by: ANESTHESIOLOGY

## 2021-02-22 PROCEDURE — 3209999900 FLUORO FOR SURGICAL PROCEDURES

## 2021-02-22 PROCEDURE — 2500000003 HC RX 250 WO HCPCS: Performed by: ANESTHESIOLOGY

## 2021-02-22 PROCEDURE — 3600000050 HC PAIN LEVEL 1 BASE: Performed by: ANESTHESIOLOGY

## 2021-02-22 PROCEDURE — 2580000003 HC RX 258: Performed by: ANESTHESIOLOGY

## 2021-02-22 PROCEDURE — 7100000010 HC PHASE II RECOVERY - FIRST 15 MIN: Performed by: ANESTHESIOLOGY

## 2021-02-22 PROCEDURE — 6360000002 HC RX W HCPCS: Performed by: ANESTHESIOLOGY

## 2021-02-22 RX ORDER — MIDAZOLAM HYDROCHLORIDE 1 MG/ML
INJECTION INTRAMUSCULAR; INTRAVENOUS PRN
Status: DISCONTINUED | OUTPATIENT
Start: 2021-02-22 | End: 2021-02-22 | Stop reason: ALTCHOICE

## 2021-02-22 RX ORDER — SODIUM CHLORIDE, SODIUM LACTATE, POTASSIUM CHLORIDE, CALCIUM CHLORIDE 600; 310; 30; 20 MG/100ML; MG/100ML; MG/100ML; MG/100ML
INJECTION, SOLUTION INTRAVENOUS CONTINUOUS
Status: DISCONTINUED | OUTPATIENT
Start: 2021-02-22 | End: 2021-02-22 | Stop reason: HOSPADM

## 2021-02-22 RX ORDER — BUPIVACAINE HYDROCHLORIDE 2.5 MG/ML
INJECTION, SOLUTION EPIDURAL; INFILTRATION; INTRACAUDAL PRN
Status: DISCONTINUED | OUTPATIENT
Start: 2021-02-22 | End: 2021-02-22 | Stop reason: ALTCHOICE

## 2021-02-22 RX ADMIN — SODIUM CHLORIDE, POTASSIUM CHLORIDE, SODIUM LACTATE AND CALCIUM CHLORIDE: 600; 310; 30; 20 INJECTION, SOLUTION INTRAVENOUS at 07:26

## 2021-02-22 NOTE — H&P
History and Physical Service   [place] [address]    HISTORY AND PHYSICAL EXAMINATION            Date of Evaluation: 2/22/2021  Patient name:  Sunil Moreno  MRN:   [id number]  YOB: 1955  PCP:    Arvind Underwood    History Obtained From:     Patient, medical records    History of Present Illness: This is Sunil Moreno a 73 y.o. female who presents today for a nerve block left medial branch T5/6/7 by Dr Bennett Wynn for thoracic spondylosis. Hx chronic back pain especially thoracic at present  that progressively worsened over time. The constant aching to sharp and stabbing pain rated 5/10 depends on her activity level. The patient follows with Dr Ayde Cooley for pain management and previous injections/nerve blocks with last procedure done 9/14/20 that provided comfort for months. The patient continues to walk 5 miles per day and has loss additional 20 #. She return for her scheduled procedure. Denies lower extremity weakness, bladder bowel incontinence, fever, chills, open sore or Hx DM. Patient has known asthma controlled with medication, MARSHAL using CPAP and follows with Cardiologist Dr Skip Heredia with last visit  for CAD s/ CABG x 2 vessel 8/6/13.   She denies dyspnea at rest, cough, wheezing, palpitations, dizziness, syncope or chest pain  Last ASA 81mg 2/21/21    Past Medical History:     Past Medical History:   Diagnosis Date    Allergic rhinitis     Arthritis     Asthma     Bunion of left foot     CAD (coronary artery disease) 8/6/13    cabg x 2    Chlorine inhalation lung injury ([facility] [address]     Severe irritability and airway reactivity     Chronic back pain     Cough variant asthma     Dyslipidemia     Dyspnea on exertion     Eczema     GERD (gastroesophageal reflux disease)     Headache     MIGRAINES    MVP (mitral valve prolapse)     Obesity     Morbid obesity with BMI of 50.0-59.9, adult    MARSHAL on CPAP     Poison ivy     States in lungs    Sleep apnea     COMPLIANT ON CPAP     Thoracic outlet syndrome     bilateral    Thyroid disease         Past Surgical History:     Past Surgical History:   Procedure Laterality Date    BACK SURGERY      lumbar fusion    CARDIAC CATHETERIZATION      CARPAL TUNNEL RELEASE Right     CATARACT REMOVAL WITH IMPLANT Right [facility]/07/2018    [facility]    CATARACT REMOVAL WITH IMPLANT Left 08/28/2018    [facility]    CHOLECYSTECTOMY      CORONARY ARTERY BYPASS GRAFT  8/6/13    cabg x 2     CYSTOSCOPY  04/10/2018    FIRST RIB REMOVAL      bilateral    HC INJECTION PROCEDURE FOR SACROILIAC JOINT Bilateral 11/22/2019    SACROILIAC JOINT INJECTION BILATERAL performed by Nikole Beatty MD at [facility] Right     NERVE BLOCK Bilateral 09/14/2020    BILATERAL L4 TRANSFORAMIAL EPIDURAL STEROID INJECTION (Bilateral     PAIN MANAGEMENT PROCEDURE Bilateral 7/23/2020    BILATERAL L4 TFE performed by Nikole Beatty MD at [place] [address] Bilateral 9/14/2020    BILATERAL L4 TRANSFORAMIAL EPIDURAL STEROID INJECTION performed by Nikole Beatty MD at [address] [place] [address] 4/10/2018    CYSTOSCOPY performed by Edward Damian MD at [address] [place] [address] W/O ECP Right 8/7/2018    EYE CATARACT EMULSIFICATION IOL IMPLANT performed by Bijal Vines MD at [address] INS IO LENS PROSTH W/O ECP Left 8/28/2018    EYE CATARACT EMULSIFICATION IOL IMPLANT performed by Bijal Vines MD at [address] [place] [address] Left     TONSILLECTOMY      TOTAL KNEE ARTHROPLASTY Left     TOTAL KNEE ARTHROPLASTY Right 03/2018    ULNAR TUNNEL RELEASE Right     VARICOSE VEIN SURGERY      bilateral        Medications Prior to Admission:     Prior to Admission medications    Medication Sig Start Date End Date Taking?  Authorizing Provider   [facility] THYROID PO Take by mouth   Yes Historical Provider, MD joshuaotidine (PEPCID) 40 MG tablet 2 times daily    Yes Historical Provider, MD   Cyanocobalamin (VITAMIN B-12) 50 MCG LOZG as directed   Yes Historical Provider, MD   Prenatal Vit-Fe Fumarate-FA (PRENATAL VITAMIN PO) Take 1 tablet by mouth daily 1/15/20  Yes Historical Provider, MD   Cholecalciferol (VITAMIN D3) 5000 units TABS Take 5,000 Units by mouth daily    Yes Historical Provider, MD   bumetanide (BUMEX) 1 MG tablet Take 1 mg by mouth three times a week Every Monday Wednesday and friday    Yes Historical Provider, MD   gabapentin (NEURONTIN) 100 MG capsule Take 100 mg by mouth 3 times daily. .   Yes Historical Provider, MD   esomeprazole (NEXIUM) 24.65 MG CPDR capsule Take 1 capsule by mouth daily    Yes Historical Provider, MD   spironolactone (ALDACTONE) 50 MG tablet Take 50 mg by mouth daily    Yes Historical Provider, MD   levothyroxine (SYNTHROID) 25 MCG tablet Take 50 mcg by mouth Daily Take 50mcg Daily   Yes Historical Provider, MD   LORazepam (ATIVAN) 0.5 MG tablet Take 0.5 mg by mouth 2 times daily as needed. .   Yes Historical Provider, MD   aspirin 81 MG EC tablet Take 1 tablet by mouth daily. 8/8/13  Yes El Rangel MD        Allergies:     Biaxin [clarithromycin], Ceclor [cefaclor], Duricef [cefadroxil], Medrol [methylprednisolone], Penicillins, Percocet [oxycodone-acetaminophen], Prednisone, Albuterol, Hydrocodone-acetaminophen, Isoniazid, Morphine, Nickel, Norco [hydrocodone-acetaminophen], Other, Paregoric, and Fentanyl    Social History:     Tobacco:    reports that she quit smoking about 39 years ago. Her smoking use included cigarettes. She started smoking about 52 years ago. She has a 24.00 pack-year smoking history. She has never used smokeless tobacco.  Alcohol:      reports no history of alcohol use. Drug Use:  reports no history of drug use.     Family History:     Family History   Problem Relation Age of Onset    Heart Disease Father     Cancer Father         lung    Heart Disease Mother 9 stents placed    Diabetes Mother     Diabetes Sister     Rheum Arthritis Sister        Review of Systems:     Positive and Negative as described in HPI. CONSTITUTIONAL:  negative for fevers, chills, sweats, fatigue, Planned additional 20# weight loss with total 150#   HEENT:  negative for vision, hearing changes, runny nose, throat pain  RESPIRATORY:  negative for shortness of breath, cough, congestion, wheezing. CARDIOVASCULAR:  Follows with Dr Tameka Hansen yearly negative for chest pain, palpitations. GASTROINTESTINAL:  negative for nausea, vomiting, diarrhea, constipation, change in bowel habits, abdominal pain   GENITOURINARY:  negative for difficulty of urination, burning with urination, frequency   INTEGUMENT:  negative for rash, skin lesions, easy bruising   HEMATOLOGIC/LYMPHATIC:  negative for swelling/edema   ALLERGIC/IMMUNOLOGIC:  negative for urticaria , itching  ENDOCRINE:  negative increase in drinking, increase in urination, hot or cold intolerance  MUSCULOSKELETAL: See HPI + thoracic pain   Walking 5 miles per day  negative joint pains, muscle aches, swelling of joints  NEUROLOGICAL:  negative for headaches, dizziness, lightheadedness, numbness, pain, tingling extremities  BEHAVIOR/PSYCH:  negative for depression, anxiety    Physical Exam:   /74   Pulse (!) 49   Temp (!) 67 °F (19.4 °C) (Temporal)   Resp 18   Ht 5' 5\" (1.651 m)   Wt 190 lb (86.2 kg)   SpO2 99%   BMI 31.62 kg/m²   No LMP recorded. Patient has had a hysterectomy. No obstetric history on file. No results for input(s): POCGLU in the last 72 hours. General Appearance:  alert, well appearing, and in no acute distress  Mental status: oriented to person, place, and time with normal affect  Head:  normocephalic, atraumatic.   Eye: no icterus, redness, pupils equal and reactive, extraocular eye movements intact, conjunctiva clear  Ear: normal external ear, no discharge, hearing intact  Nose:  no drainage noted  Mouth: mucous membranes moist  Neck: supple, no carotid bruits, thyroid not palpable  Lungs: Bilateral equal air entry, clear to ausculation, no wheezing, rales or rhonchi, normal effort  Cardiovascular: HR 49 asymptomatic bradycardic rate and regular rhythm, no murmur, gallop, rub. Abdomen: Soft, round, nontender, nondistended, normal bowel sounds  Neurologic: There are no new focal motor or sensory deficits, normal muscle tone and bulk, no abnormal sensation, normal speech, cranial nerves II through XII grossly intact  Skin: No gross lesions, rashes, bruising or bleeding on exposed skin area  Extremities: mild vascular skin changes  peripheral pulses palpable, no pedal edema or calf pain with palpation  Psych: normal affect     Investigations:      Laboratory Testing:  No results found for this or any previous visit (from the past 24 hour(s)). No results for input(s): HGB, HCT, WBC, MCV, PLATELET, NA, K, CL, CO2, BUN, CREATININE, GLUCOSE, INR, PROTIME, APTT, AST, ALT, LABALBU, HCG in the last 720 hours. No results for input(s): COVID19 in the last 720 hours. Imaging/Diagnostics:    Us Head Neck Soft Tissue Thyroid    Result Date: 2/19/2021  EXAMINATION: ULTRASOUND OF THE SOFT TISSUES OF THE HEAD AND NECK 2/18/2021 COMPARISON: None. HISTORY: ORDERING SYSTEM PROVIDED HISTORY: Palpable lump for 2 weeks. FINDINGS: In the soft tissues of the upper left neck, a lymph node is demonstrated in the reported area of clinical interest, just inferior to the jaw on the left side anteriorly, measuring 8 mm x 8 mm x 5 mm. It appears morphologically unremarkable. As described above, just inferior to the mandible on the left side, there is an unremarkable appearing subcentimeter lymph node. Clinical correlation and follow-up recommended. Diagnosis:      1. Thoracic spondylosis    Plans:     1.  Nerve block left medial branch T5/6/7       Subha Stallworth, APRN - CNP  2/22/2021  7:04 AM

## 2021-02-22 NOTE — OP NOTE
Operative Note      Patient: Arjun Montes  YOB: 1955  MRN: [id number]    Date of Procedure: 2/22/2021    PREOPERATIVE DIAGNOSIS:  Thoracic spondylosis without myelopathy  Thoracic spine pain      PROCEDURE:  Left thoracic median branch nerve block at the level of T5,T6 and T7 to block left facet joints at Left T6-7 and T7-8 under fluoroscopy    POSTOPERATIVE DIAGNOSIS:  Same    ESTIMATED BLOOD COUNT:    None. COMPLICATIONS:    None. SPECIMENS:  None. MONITORS:  Standard ASA monitors were placed during the entire procedure and the immediate postoperative period. The patient was communicating with us throughout the whole entire procedure. PREPARATION FOR THE PROCEDURE:  Oxygen saturation and vital signs were monitored continuously throughout the procedure. The patient remained awake throughout the procedure in order to interact and give feedback. The x-ray technician was supervised and instructed to operate the fluoroscopy machine. INFORMED CONSENT:   The risks, benefits and alternatives of the procedure were discussed with the patient. The patient was given opportunity to ask questions regarding the procedure, its indications and the associated risks. The risks of the procedure discussed include infection, bleeding, allergic reaction, dural puncture, headache, nerve injuries, spinal cord injury, and cardiovascular and CNS side effects with possibility of vascular entry of medications. I also informed the patient of potential side effects or reactions to the medications potentially used during the procedure including sedatives, narcotics, nonionic contrast agents, anesthetics, and corticosteroids. The patient was informed both verbally and in writing. The patient understood the informed consent and desired to have the procedure performed. PROCEDURE:  The patient was placed in the prone position on the treatment table with a pillow under the abdomen to reduce the lordosis.   The skin over and surrounding the treatment area was cleaned with chlorhexidine. The area was covered with sterile drapes, leaving a small window opening for needle placement. Fluoroscopy was used to identify the bony landmarks of the spine and the planned needle approach. The skin, subcutaneous tissue, and muscle within the planned approach were anesthetized with 1% lidocaine. With fluoroscopy, a 22-gauge spinal needle was gently guided into the region of the median branch nerves at the above levels at the level of T5,T6 and T7 to block left facet joints at Left T6-7 and T7-8 under fluoroscopy. Specifically, each needle tip was inserted towards the superolateral aspect of the transverse process. Needle localization was confirmed with AP, nicolle dog views and lateral radiographs. At each level, after syringe aspiration with no blood return, 0.5 mL 0.5% bupivacaine was injected to anesthetize the medial branch nerve and surrounding tissue. All injected medications were preservative-free. Sterile technique was used throughout the procedure. The patient tolerated the procedure well, received monitored anesthesia care, and was hemodynamically stable throughout the entire process. POSTPROCEDURE INSTRUCTIONS:  Postprocedure vital signs and oximetry were stable. The patient was discharged with instructions to ice the injection site as needed for 15-20 minutes, as frequently as twice per hour for the next day and to avoid aggressive activities for 1 day. The patient was told to resume all medications. The patient was told to be in relative rest for 1 day, but then could resume all normal activities. The patient was instructed to seek immediate medical attention for shortness of breath, chest pain, fever, chills, increased pain, weakness, sensory or motor changes, or changes in bowel or bladder function.       Surgeon(s):  Jose Diez MD    Assistant:   * No surgical staff found *    Anesthesia: IV Sedation    Estimated Blood Loss (mL): Minimal    Complications: None    Specimens:   * No specimens in log *    Implants:  * No implants in log *      Drains: * No LDAs found *        Electronically signed by Jimi Garcia MD on 2/22/2021 at 8:17 AM

## 2021-02-22 NOTE — H&P (VIEW-ONLY)
History and Physical Service   [facility] - INPATIENT    HISTORY AND PHYSICAL EXAMINATION            Date of Evaluation: 2/22/2021  Patient name:  Jessica Rocha  MRN:   [id number]  YOB: 1955  PCP:    Reji Wagner    History Obtained From:     Patient, medical records    History of Present Illness: This is Jessica Rocha a 73 y.o. female who presents today for a nerve block left medial branch T5/6/7 by Dr Rambo Everett for thoracic spondylosis. Hx chronic back pain especially thoracic at present  that progressively worsened over time. The constant aching to sharp and stabbing pain rated 5/10 depends on her activity level. The patient follows with Dr Lulu Ruiz for pain management and previous injections/nerve blocks with last procedure done 9/14/20 that provided comfort for months. The patient continues to walk 5 miles per day and has loss additional 20 #. She return for her scheduled procedure. Denies lower extremity weakness, bladder bowel incontinence, fever, chills, open sore or Hx DM. Patient has known asthma controlled with medication, MARSHLA using CPAP and follows with Cardiologist Dr Grady Horta with last visit  for CAD s/ CABG x 2 vessel 8/6/13.   She denies dyspnea at rest, cough, wheezing, palpitations, dizziness, syncope or chest pain  Last ASA 81mg 2/21/21    Past Medical History:     Past Medical History:   Diagnosis Date    Allergic rhinitis     Arthritis     Asthma     Bunion of left foot     CAD (coronary artery disease) 8/6/13    cabg x 2    Chlorine inhalation lung injury ([address]     Severe irritability and airway reactivity     Chronic back pain     Cough variant asthma     Dyslipidemia     Dyspnea on exertion     Eczema     GERD (gastroesophageal reflux disease)     Headache     MIGRAINES    MVP (mitral valve prolapse)     Obesity     Morbid obesity with BMI of 50.0-59.9, adult    MARSHAL on CPAP     Poison ivy     States in lungs    Sleep apnea     COMPLIANT ON CPAP     Thoracic outlet syndrome     bilateral    Thyroid disease         Past Surgical History:     Past Surgical History:   Procedure Laterality Date    BACK SURGERY      lumbar fusion    CARDIAC CATHETERIZATION      CARPAL TUNNEL RELEASE Right     CATARACT REMOVAL WITH IMPLANT Right 08/07/2018    [facility]    CATARACT REMOVAL WITH IMPLANT Left 08/28/2018    [facility]    CHOLECYSTECTOMY      CORONARY ARTERY BYPASS GRAFT  8/6/13    cabg x 2     CYSTOSCOPY  04/10/2018    FIRST RIB REMOVAL      bilateral    HC INJECTION PROCEDURE FOR SACROILIAC JOINT Bilateral 11/22/2019    SACROILIAC JOINT INJECTION BILATERAL performed [facility] Jose [facility]z MD at [facility] Right     NERVE BLOCK Bilateral 09/14/2020    BILATERAL L4 TRANSFORAMIAL EPIDURAL STEROID INJECTION (Bilateral     PAIN MANAGEMENT PROCEDURE Bilateral 7/23/2020    BILATERAL L4 TFE performed by Jose Diez MD at [facility] Bilateral 9/14/2020    BILATERAL L4 TRANSFORAMIAL EPIDURAL STEROID INJECTION performed by Jose Diez MD at [address] [facility] 4/10/2018    CYSTOSCOPY performed by Sadia Thomas MD at [facility] W/O ECP Right 8/7/2018    EYE CATARACT EMULSIFICATION IOL IMPLANT performed by Sedrick Brown MD at [facility] 60 RMVL INSJ IO LENS PROSTH W/O ECP Left 8/28/2018    EYE CATARACT EMULSIFICATION IOL IMPLANT performed by Sedrick Brown MD at [address] Left     TONSILLECTOMY      TOTAL KNEE ARTHROPLASTY Left     TOTAL KNEE ARTHROPLASTY Right 03/2018    ULNAR TUNNEL RELEASE Right     VARICOSE VEIN SURGERY      bilateral        Medications Prior to Admission:     Prior to Admission medications    Medication Sig Start Date End Date Taking?  Authorizing Provider   [facility] THYROID PO Take by mouth   Yes Historical Provider, MD   famotidine (PEPCID) 40 MG tablet 2 times daily    Yes Historical Provider, MD   Cyanocobalamin (VITAMIN B-12) 50 MCG LOZG as directed   Yes Historical Provider, MD   Prenatal Vit-Fe Fumarate-FA (PRENATAL VITAMIN PO) Take 1 tablet by mouth daily 1/15/20  Yes Historical Provider, MD   Cholecalciferol (VITAMIN D3) 5000 units TABS Take 5,000 Units by mouth daily    Yes Historical Provider, MD   bumetanide (BUMEX) 1 MG tablet Take 1 mg by mouth three times a week Every Monday Wednesday and friday    Yes Historical Provider, MD   gabapentin (NEURONTIN) 100 MG capsule Take 100 mg by mouth 3 times daily. .   Yes Historical Provider, MD   esomeprazole (NEXIUM) 24.65 MG CPDR capsule Take 1 capsule by mouth daily    Yes Historical Provider, MD   spironolactone (ALDACTONE) 50 MG tablet Take 50 mg by mouth daily    Yes Historical Provider, MD   levothyroxine (SYNTHROID) 25 MCG tablet Take 50 mcg by mouth Daily Take 50mcg Daily   Yes Historical Provider, MD   LORazepam (ATIVAN) 0.5 MG tablet Take 0.5 mg by mouth 2 times daily as needed. .   Yes Historical Provider, MD   aspirin 81 MG EC tablet Take 1 tablet by mouth daily. 8/8/13  Yes Gina Darby MD        Allergies:     Biaxin [clarithromycin], Ceclor [cefaclor], Duricef [cefadroxil], Medrol [methylprednisolone], Penicillins, Percocet [oxycodone-acetaminophen], Prednisone, Albuterol, Hydrocodone-acetaminophen, Isoniazid, Morphine, Nickel, Norco [hydrocodone-acetaminophen], Other, Paregoric, and Fentanyl    Social History:     Tobacco:    reports that she quit smoking about 39 years ago. Her smoking use included cigarettes. She started smoking about 52 years ago. She has a 24.00 pack-year smoking history. She has never used smokeless tobacco.  Alcohol:      reports no history of alcohol use. Drug Use:  reports no history of drug use.     Family History:     Family History   Problem Relation Age of Onset    Heart Disease Father     Cancer Father         lung    Heart Disease Mother 9 stents placed    Diabetes Mother     Diabetes Sister     Rheum Arthritis Sister        Review of Systems:     Positive and Negative as described in HPI. CONSTITUTIONAL:  negative for fevers, chills, sweats, fatigue, Planned additional 20# weight loss with total 150#   HEENT:  negative for vision, hearing changes, runny nose, throat pain  RESPIRATORY:  negative for shortness of breath, cough, congestion, wheezing. CARDIOVASCULAR:  Follows with Dr Luis Manuel Sanders yearly negative for chest pain, palpitations. GASTROINTESTINAL:  negative for nausea, vomiting, diarrhea, constipation, change in bowel habits, abdominal pain   GENITOURINARY:  negative for difficulty of urination, burning with urination, frequency   INTEGUMENT:  negative for rash, skin lesions, easy bruising   HEMATOLOGIC/LYMPHATIC:  negative for swelling/edema   ALLERGIC/IMMUNOLOGIC:  negative for urticaria , itching  ENDOCRINE:  negative increase in drinking, increase in urination, hot or cold intolerance  MUSCULOSKELETAL: See HPI + thoracic pain   Walking 5 miles per day  negative joint pains, muscle aches, swelling of joints  NEUROLOGICAL:  negative for headaches, dizziness, lightheadedness, numbness, pain, tingling extremities  BEHAVIOR/PSYCH:  negative for depression, anxiety    Physical Exam:   /74   Pulse (!) 49   Temp (!) 67 °F (19.4 °C) (Temporal)   Resp 18   Ht 5' 5\" (1.651 m)   Wt 190 lb (86.2 kg)   SpO2 99%   BMI 31.62 kg/m²   No LMP recorded. Patient has had a hysterectomy. No obstetric history on file. No results for input(s): POCGLU in the last 72 hours. General Appearance:  alert, well appearing, and in no acute distress  Mental status: oriented to person, place, and time with normal affect  Head:  normocephalic, atraumatic.   Eye: no icterus, redness, pupils equal and reactive, extraocular eye movements intact, conjunctiva clear  Ear: normal external ear, no discharge, hearing intact  Nose:  no drainage noted  Mouth: mucous membranes moist  Neck: supple, no carotid bruits, thyroid not palpable  Lungs: Bilateral equal air entry, clear to ausculation, no wheezing, rales or rhonchi, normal effort  Cardiovascular: HR 49 asymptomatic bradycardic rate and regular rhythm, no murmur, gallop, rub. Abdomen: Soft, round, nontender, nondistended, normal bowel sounds  Neurologic: There are no new focal motor or sensory deficits, normal muscle tone and bulk, no abnormal sensation, normal speech, cranial nerves II through XII grossly intact  Skin: No gross lesions, rashes, bruising or bleeding on exposed skin area  Extremities: mild vascular skin changes  peripheral pulses palpable, no pedal edema or calf pain with palpation  Psych: normal affect     Investigations:      Laboratory Testing:  No results found for this or any previous visit (from the past 24 hour(s)). No results for input(s): HGB, HCT, WBC, MCV, PLATELET, NA, K, CL, CO2, BUN, CREATININE, GLUCOSE, INR, PROTIME, APTT, AST, ALT, LABALBU, HCG in the last 720 hours. No results for input(s): COVID19 in the last 720 hours. Imaging/Diagnostics:    Us Head Neck Soft Tissue Thyroid    Result Date: 2/19/2021  EXAMINATION: ULTRASOUND OF THE SOFT TISSUES OF THE HEAD AND NECK 2/18/2021 COMPARISON: None. HISTORY: ORDERING SYSTEM PROVIDED HISTORY: Palpable lump for 2 weeks. FINDINGS: In the soft tissues of the upper left neck, a lymph node is demonstrated in the reported area of clinical interest, just inferior to the jaw on the left side anteriorly, measuring 8 mm x 8 mm x 5 mm. It appears morphologically unremarkable. As described above, just inferior to the mandible on the left side, there is an unremarkable appearing subcentimeter lymph node. Clinical correlation and follow-up recommended. Diagnosis:      1. Thoracic spondylosis    Plans:     1.  Nerve block left medial branch T5/6/7       LIZETTE Baker - CNP  2/22/2021  7:04 AM

## 2021-02-26 ENCOUNTER — APPOINTMENT (OUTPATIENT)
Dept: CT IMAGING | Age: 66
End: 2021-02-26
Payer: MEDICARE

## 2021-02-26 ENCOUNTER — HOSPITAL ENCOUNTER (EMERGENCY)
Age: 66
Discharge: HOME | End: 2021-02-26
Attending: EMERGENCY MEDICINE
Payer: MEDICARE

## 2021-02-26 VITALS
WEIGHT: 201.2 LBS | RESPIRATION RATE: 18 BRPM | BODY MASS INDEX: 33.52 KG/M2 | SYSTOLIC BLOOD PRESSURE: 117 MMHG | HEART RATE: 72 BPM | DIASTOLIC BLOOD PRESSURE: 63 MMHG | OXYGEN SATURATION: 100 % | TEMPERATURE: 97.4 F | HEIGHT: 65 IN

## 2021-02-26 DIAGNOSIS — R10.12 LEFT UPPER QUADRANT ABDOMINAL PAIN: Primary | ICD-10-CM

## 2021-02-26 LAB
-: ABNORMAL
ABSOLUTE EOS #: 0.12 K/UL (ref 0–0.44)
ABSOLUTE IMMATURE GRANULOCYTE: 0.01 K/UL (ref 0–0.3)
ABSOLUTE LYMPH #: 2.11 K/UL (ref 1.1–3.7)
ABSOLUTE MONO #: 0.34 K/UL (ref 0.1–1.2)
ALBUMIN SERPL-MCNC: 3.9 G/DL (ref 3.5–5.2)
ALBUMIN/GLOBULIN RATIO: ABNORMAL (ref 1–2.5)
ALP BLD-CCNC: 51 U/L (ref 35–104)
ALT SERPL-CCNC: 41 U/L (ref 5–33)
AMORPHOUS: ABNORMAL
ANION GAP SERPL CALCULATED.3IONS-SCNC: 10 MMOL/L (ref 9–17)
AST SERPL-CCNC: 35 U/L
BACTERIA: ABNORMAL
BASOPHILS # BLD: 1 % (ref 0–2)
BASOPHILS ABSOLUTE: 0.03 K/UL (ref 0–0.2)
BILIRUB SERPL-MCNC: 0.44 MG/DL (ref 0.3–1.2)
BILIRUBIN URINE: NEGATIVE
BUN BLDV-MCNC: 21 MG/DL (ref 8–23)
BUN/CREAT BLD: 26 (ref 9–20)
CALCIUM SERPL-MCNC: 9.4 MG/DL (ref 8.6–10.4)
CASTS UA: ABNORMAL /LPF
CHLORIDE BLD-SCNC: 104 MMOL/L (ref 98–107)
CHP ED QC CHECK: NORMAL
CO2: 24 MMOL/L (ref 20–31)
COLOR: YELLOW
COMMENT UA: ABNORMAL
CREAT SERPL-MCNC: 0.82 MG/DL (ref 0.5–0.9)
CRYSTALS, UA: ABNORMAL /HPF
DIFFERENTIAL TYPE: NORMAL
EOSINOPHILS RELATIVE PERCENT: 2 % (ref 1–4)
EPITHELIAL CELLS UA: ABNORMAL /HPF (ref 0–5)
GFR AFRICAN AMERICAN: >60 ML/MIN
GFR NON-AFRICAN AMERICAN: >60 ML/MIN
GFR SERPL CREATININE-BSD FRML MDRD: ABNORMAL ML/MIN/{1.73_M2}
GFR SERPL CREATININE-BSD FRML MDRD: ABNORMAL ML/MIN/{1.73_M2}
GLUCOSE BLD-MCNC: 85 MG/DL (ref 70–99)
GLUCOSE URINE: NEGATIVE
HCT VFR BLD CALC: 41.3 % (ref 36.3–47.1)
HEMOGLOBIN: 13.3 G/DL (ref 11.9–15.1)
IMMATURE GRANULOCYTES: 0 %
KETONES, URINE: NEGATIVE
LEUKOCYTE ESTERASE, URINE: NEGATIVE
LIPASE: 25 U/L (ref 13–60)
LYMPHOCYTES # BLD: 39 % (ref 24–43)
MCH RBC QN AUTO: 30.2 PG (ref 25.2–33.5)
MCHC RBC AUTO-ENTMCNC: 32.2 G/DL (ref 28.4–34.8)
MCV RBC AUTO: 93.7 FL (ref 82.6–102.9)
MONOCYTES # BLD: 6 % (ref 3–12)
MUCUS: ABNORMAL
NITRITE, URINE: NEGATIVE
NRBC AUTOMATED: 0 PER 100 WBC
OTHER OBSERVATIONS UA: ABNORMAL
PDW BLD-RTO: 13.2 % (ref 11.8–14.4)
PH UA: 6.5 (ref 5–8)
PLATELET # BLD: 155 K/UL (ref 138–453)
PLATELET ESTIMATE: NORMAL
PMV BLD AUTO: 11.1 FL (ref 8.1–13.5)
POTASSIUM SERPL-SCNC: 3.9 MMOL/L (ref 3.7–5.3)
PROTEIN UA: NEGATIVE
RBC # BLD: 4.41 M/UL (ref 3.95–5.11)
RBC # BLD: NORMAL 10*6/UL
RBC UA: ABNORMAL /HPF (ref 0–2)
RENAL EPITHELIAL, UA: ABNORMAL /HPF
SEG NEUTROPHILS: 52 % (ref 36–65)
SEGMENTED NEUTROPHILS ABSOLUTE COUNT: 2.81 K/UL (ref 1.5–8.1)
SODIUM BLD-SCNC: 138 MMOL/L (ref 135–144)
SPECIFIC GRAVITY UA: 1.02 (ref 1–1.03)
TOTAL PROTEIN: 6.4 G/DL (ref 6.4–8.3)
TRICHOMONAS: ABNORMAL
TURBIDITY: ABNORMAL
URINE HGB: NEGATIVE
UROBILINOGEN, URINE: NORMAL
WBC # BLD: 5.4 K/UL (ref 3.5–11.3)
WBC # BLD: NORMAL 10*3/UL
WBC UA: ABNORMAL /HPF (ref 0–5)
YEAST: ABNORMAL

## 2021-02-26 PROCEDURE — 81003 URINALYSIS AUTO W/O SCOPE: CPT

## 2021-02-26 PROCEDURE — 80053 COMPREHEN METABOLIC PANEL: CPT

## 2021-02-26 PROCEDURE — 83690 ASSAY OF LIPASE: CPT

## 2021-02-26 PROCEDURE — 81001 URINALYSIS AUTO W/SCOPE: CPT

## 2021-02-26 PROCEDURE — 6360000002 HC RX W HCPCS: Performed by: PHYSICIAN ASSISTANT

## 2021-02-26 PROCEDURE — 85025 COMPLETE CBC W/AUTO DIFF WBC: CPT

## 2021-02-26 PROCEDURE — 2580000003 HC RX 258: Performed by: EMERGENCY MEDICINE

## 2021-02-26 PROCEDURE — 6360000004 HC RX CONTRAST MEDICATION: Performed by: EMERGENCY MEDICINE

## 2021-02-26 PROCEDURE — 96375 TX/PRO/DX INJ NEW DRUG ADDON: CPT

## 2021-02-26 PROCEDURE — 74177 CT ABD & PELVIS W/CONTRAST: CPT

## 2021-02-26 PROCEDURE — 99282 EMERGENCY DEPT VISIT SF MDM: CPT

## 2021-02-26 PROCEDURE — 2580000003 HC RX 258: Performed by: PHYSICIAN ASSISTANT

## 2021-02-26 PROCEDURE — 96374 THER/PROPH/DIAG INJ IV PUSH: CPT

## 2021-02-26 RX ORDER — 0.9 % SODIUM CHLORIDE 0.9 %
80 INTRAVENOUS SOLUTION INTRAVENOUS ONCE
Status: COMPLETED | OUTPATIENT
Start: 2021-02-26 | End: 2021-02-26

## 2021-02-26 RX ORDER — METHOCARBAMOL 750 MG/1
750 TABLET, FILM COATED ORAL 4 TIMES DAILY
Qty: 40 TABLET | Refills: 0 | Status: SHIPPED | OUTPATIENT
Start: 2021-02-26 | End: 2021-03-08

## 2021-02-26 RX ORDER — ONDANSETRON 2 MG/ML
4 INJECTION INTRAMUSCULAR; INTRAVENOUS ONCE
Status: COMPLETED | OUTPATIENT
Start: 2021-02-26 | End: 2021-02-26

## 2021-02-26 RX ORDER — SODIUM CHLORIDE 0.9 % (FLUSH) 0.9 %
10 SYRINGE (ML) INJECTION PRN
Status: DISCONTINUED | OUTPATIENT
Start: 2021-02-26 | End: 2021-02-26 | Stop reason: HOSPADM

## 2021-02-26 RX ORDER — 0.9 % SODIUM CHLORIDE 0.9 %
1000 INTRAVENOUS SOLUTION INTRAVENOUS ONCE
Status: COMPLETED | OUTPATIENT
Start: 2021-02-26 | End: 2021-02-26

## 2021-02-26 RX ORDER — NAPROXEN 500 MG/1
500 TABLET ORAL 2 TIMES DAILY WITH MEALS
Qty: 20 TABLET | Refills: 0 | Status: ON HOLD | OUTPATIENT
Start: 2021-02-26 | End: 2021-03-09

## 2021-02-26 RX ORDER — FENTANYL CITRATE 50 UG/ML
50 INJECTION, SOLUTION INTRAMUSCULAR; INTRAVENOUS ONCE
Status: COMPLETED | OUTPATIENT
Start: 2021-02-26 | End: 2021-02-26

## 2021-02-26 RX ORDER — ACETAMINOPHEN AND CODEINE PHOSPHATE 300; 30 MG/1; MG/1
1-2 TABLET ORAL 3 TIMES DAILY PRN
Qty: 15 TABLET | Refills: 0 | Status: SHIPPED | OUTPATIENT
Start: 2021-02-26 | End: 2021-03-01

## 2021-02-26 RX ADMIN — SODIUM CHLORIDE 80 ML: 9 INJECTION, SOLUTION INTRAVENOUS at 15:18

## 2021-02-26 RX ADMIN — IOPAMIDOL 75 ML: 755 INJECTION, SOLUTION INTRAVENOUS at 15:18

## 2021-02-26 RX ADMIN — Medication 10 ML: at 15:18

## 2021-02-26 RX ADMIN — FENTANYL CITRATE 50 MCG: 50 INJECTION, SOLUTION INTRAMUSCULAR; INTRAVENOUS at 14:15

## 2021-02-26 RX ADMIN — SODIUM CHLORIDE 1000 ML: 9 INJECTION, SOLUTION INTRAVENOUS at 14:39

## 2021-02-26 RX ADMIN — ONDANSETRON 4 MG: 2 INJECTION INTRAMUSCULAR; INTRAVENOUS at 14:15

## 2021-02-26 NOTE — ED PROVIDER NOTES
[address]     Pt Name: Kinsey Garland  MRN: [id number]  [place] 1955  Date of evaluation: 2/26/21   Kinsey Garland is a 73 y.o. female with CC: Abdominal Pain (onset yesterday morning. pt took some tylenol that did help. tylenol not helping today per pt. Pt c/o nausea but denies vomiting. pt states she has had 2 BM's today but they were not diarrhea. pt denies urinary complaints. hx of gastric bypass 1 year ago)    MDM:   The patient is a 31-year-old female who presented to the emergency department secondary to abdominal pain. No acute findings on laboratory analysis or imaging. Patient's discharged home with outpatient follow-up and given parameters to return to the emergency department. CRITICAL CARE:       EKG: All EKG's are interpreted by the Emergency Department Physician who either signs or Co-signs this chart in the absence of a cardiologist.      RADIOLOGY:All plain film, CT, MRI, and formal ultrasound images (except ED bedside ultrasound) are read by the radiologist, see reports below, unless otherwise noted in MDM or here. CT ABDOMEN PELVIS W IV CONTRAST   Final Result   No acute intra-abdominal or intrapelvic abnormalities are noted. LABS: All lab results were reviewed by myself, and all abnormals are listed below.   Labs Reviewed   URINE RT REFLEX TO CULTURE - Abnormal; Notable for the following components:       Result Value    Turbidity UA SLIGHTLY CLOUDY (*)     All other components within normal limits   COMPREHENSIVE METABOLIC PANEL - Abnormal; Notable for the following components:    Bun/Cre Ratio 26 (*)     ALT 41 (*)     AST 35 (*)     All other components within normal limits   MICROSCOPIC URINALYSIS - Abnormal; Notable for the following components:    Crystals, UA 20 TO 50 CALCIUM OXALATE (*)     All other components within normal limits   POCT URINALYSIS DIPSTICK - Normal   CBC WITH AUTO DIFFERENTIAL   LIPASE CONSULTS:  None  FINAL IMPRESSION    No diagnosis found.         PASTMEDICAL HISTORY     Past Medical History:   Diagnosis Date    Allergic rhinitis     Arthritis     Asthma     Bunion of left foot     CAD (coronary artery disease) 8/6/13    cabg x 2    Chlorine inhalation lung injury ([address]     Severe irritability and airway reactivity     Chronic back pain     Cough variant asthma     Dyslipidemia     Dyspnea on exertion     Eczema     GERD (gastroesophageal reflux disease)     Headache     MIGRAINES    MVP (mitral valve prolapse)     Obesity     Morbid obesity with BMI of 50.0-59.9, adult    MARSHAL on CPAP     Poison ivy     States in lungs    Sleep apnea     COMPLIANT ON CPAP     Thoracic outlet syndrome     bilateral    Thyroid disease      SURGICAL HISTORY       Past Surgical History:   Procedure Laterality Date    BACK SURGERY      lumbar fusion    CARDIAC CATHETERIZATION      CARPAL TUNNEL RELEASE Right     CATARACT REMOVAL WITH IMPLANT Right [facility]/07[facility]2018    [facility]    CATARACT REMOVAL WITH IMPLANT Left 08/28/2018    [facility]    CHOLECYSTECTOMY      CORONARY ARTERY BYPASS GRAFT  8/6/13    cabg x 2     CYSTOSCOPY  04/10/2018    FIRST RIB REMOVAL      bilateral    HC INJECTION PROCEDURE FOR SACROILIAC JOINT Bilateral 11/22/2019    SACROILIAC JOINT INJECTION BILATERAL performed by Lauro Álvarez MD at [facility] Right     NERVE BLOCK Bilateral 09/14/2020    BILATERAL L4 TRANSFORAMIAL EPIDURAL STEROID INJECTION (Bilateral     NERVE BLOCK Left 2/22/2021    NERVE BLOCK LEFT MEDIAL BRANCH T 5 6 AND 7 performed by Lauro Álvarez MD at [address] [place] [address] Bilateral 7/23/2020    BILATERAL L4 TFE performed by Lauro Álvarez MD at [address] [place] [address] Bilateral 9/14/2020    BILATERAL L4 TRANSFORAMIAL EPIDURAL STEROID INJECTION performed by Lauro Álvarez MD at [address] [place] [address] CYSTOURETHROSCOPY N/A 4/10/2018    CYSTOSCOPY performed by Frankey Janus, MD at [address] [facility] [address] W/O ECP Right 2018    EYE CATARACT EMULSIFICATION IOL IMPLANT performed by Ramiro Douglass MD at [facility] 60 RMVL INSJ IO LENS PROSTH W/O ECP Left 2018    EYE CATARACT EMULSIFICATION IOL IMPLANT performed by Ramiro Douglass MD at [address] Left     TONSILLECTOMY      TOTAL KNEE ARTHROPLASTY Left     TOTAL KNEE ARTHROPLASTY Right 2018    ULNAR TUNNEL RELEASE Right     VARICOSE VEIN SURGERY      bilateral     CURRENT MEDICATIONS       Previous Medications    ASPIRIN 81 MG EC TABLET    Take 1 tablet by mouth daily. BUMETANIDE (BUMEX) 1 MG TABLET    Take 1 mg by mouth three times a week Every  and friday     CHOLECALCIFEROL (VITAMIN D3) 5000 UNITS TABS    Take 5,000 Units by mouth daily     CYANOCOBALAMIN (VITAMIN B-12) 50 MCG LOZG    as directed    ESOMEPRAZOLE (NEXIUM) 24.65 MG CPDR CAPSULE    Take 1 capsule by mouth daily     FAMOTIDINE (PEPCID) 40 MG TABLET    2 times daily     GABAPENTIN (NEURONTIN) 100 MG CAPSULE    Take 100 mg by mouth 3 times daily. Adonna Dayhoff LEVOTHYROXINE (SYNTHROID) 25 MCG TABLET    Take 50 mcg by mouth Daily Take 50mcg Daily    LORAZEPAM (ATIVAN) 0.5 MG TABLET    Take 0.5 mg by mouth 2 times daily as needed. Adonna Dayhoff PRENATAL VIT-FE FUMARATE-FA (PRENATAL VITAMIN PO)    Take 1 tablet by mouth daily    SPIRONOLACTONE (ALDACTONE) 50 MG TABLET    Take 50 mg by mouth daily     WP THYROID PO    Take by mouth     ALLERGIES     is allergic to biaxin [clarithromycin]; ceclor [cefaclor]; duricef [cefadroxil]; medrol [methylprednisolone]; penicillins; percocet [oxycodone-acetaminophen]; prednisone; albuterol; hydrocodone-acetaminophen; isoniazid; morphine; nickel; norco [hydrocodone-acetaminophen]; other; paregoric; and fentanyl. FAMILY HISTORY     She indicated that her mother is .  She indicated that her father is . She indicated that four of her six sisters are alive. She indicated that her brother is . SOCIAL HISTORY       Social History     Tobacco Use    Smoking status: Former Smoker     Packs/day: 2.00     Years: 12.00     Pack years: 24.00     Types: Cigarettes     Start date: 1969     Quit date: 4/15/1981     Years since quittin.8    Smokeless tobacco: Never Used   Substance Use Topics    Alcohol use: No    Drug use: No       I personally evaluated and examined the patient in conjunction with the APC and agree with the assessment, treatment plan, and disposition of the patient as recorded by the APC.    Susu Gannon MD  Attending Emergency Physician         Susu Gannon MD  52/[id number]

## 2021-02-26 NOTE — ED PROVIDER NOTES
[address] [facility] ED  eMERGENCY dEPARTMENTeN[facility]      Pt Name: Arjun Montes  MRN: [id number]  Armstroygfroshni 1955  Date ofevaluation: 2/26/2021  Provider: Sydney Reddy Dr       Chief Complaint   Patient presents with    Abdominal Pain     onset yesterday morning. pt took some tylenol that did help. tylenol not helping today per pt. Pt c/o nausea but denies vomiting. pt states she has had 2 BM's today but they were not diarrhea. pt denies urinary complaints. hx of gastric bypass 1 year ago         HISTORY OF PRESENT ILLNESS  (Location/Symptom, Timing/Onset, Context/Setting, Quality, Duration, Modifying Factors, Severity.)   Arjun Montes is a 73 y.o. female who presents to the emergency department with left-sided flank and abdominal pain since yesterday. Patient did have a gastric bypass roughly 1 year ago. States he is having some trouble urinating but is having BMs. Denies any fevers or chills. No bloody or black stools. No definite alleviating aggravating factors. No other complaints. Nursing Notes were reviewed.     ALLERGIES     Biaxin [clarithromycin], Ceclor [cefaclor], Duricef [cefadroxil], Medrol [methylprednisolone], Penicillins, Percocet [oxycodone-acetaminophen], Prednisone, Albuterol, Hydrocodone-acetaminophen, Isoniazid, Morphine, Nickel, Norco [hydrocodone-acetaminophen], Other, Paregoric, and Fentanyl    CURRENT MEDICATIONS       Discharge Medication List as of 2/26/2021  4:31 PM      CONTINUE these medications which have NOT CHANGED    Details   WP THYROID PO Take by mouthHistorical Med      famotidine (PEPCID) 40 MG tablet 2 times daily Historical Med      Cyanocobalamin (VITAMIN B-12) 50 MCG LOZG as directedHistorical Med      Prenatal Vit-Fe Fumarate-FA (PRENATAL VITAMIN PO) Take 1 tablet by mouth dailyHistorical Med      Cholecalciferol (VITAMIN D3) 5000 units TABS Take 5,000 Units by mouth daily Historical Med      bumetanide (BUMEX) 1 MG tablet Take 1 mg by mouth three times a week Every Monday Wednesday and friday Historical Med      gabapentin (NEURONTIN) 100 MG capsule Take 100 mg by mouth 3 times daily. Lysbeth Carrel Historical Med      esomeprazole (NEXIUM) 24.65 MG CPDR capsule Take 1 capsule by mouth daily Historical Med      spironolactone (ALDACTONE) 50 MG tablet Take 50 mg by mouth daily Historical Med      levothyroxine (SYNTHROID) 25 MCG tablet Take 50 mcg by mouth Daily Take 50mcg DailyHistorical Med      LORazepam (ATIVAN) 0.5 MG tablet Take 0.5 mg by mouth 2 times daily as needed. Lysbeth Carrel Historical Med      aspirin 81 MG EC tablet Take 1 tablet by mouth daily. , Disp-30 tablet, R-1             PAST MEDICAL HISTORY         Diagnosis Date    Allergic rhinitis     Arthritis     Asthma     Bunion of left foot     CAD (coronary artery disease) 8/6/13    cabg x 2    Chlorine inhalation lung injury ([address]     Severe irritability and airway reactivity     Chronic back pain     Cough variant asthma     Dyslipidemia     Dyspnea on exertion     Eczema     GERD (gastroesophageal reflux disease)     Headache     MIGRAINES    MVP (mitral valve prolapse)     Obesity     Morbid obesity with BMI of 50.0-59.9, adult    MARSHAL on CPAP     Poison ivy     States in lungs    Sleep apnea     COMPLIANT ON CPAP     Thoracic outlet syndrome     bilateral    Thyroid disease        SURGICAL HISTORY           Procedure Laterality Date    BACK SURGERY      lumbar fusion    CARDIAC CATHETERIZATION      CARPAL TUNNEL RELEASE Right     CATARACT REMOVAL WITH IMPLANT Right 08/07/2018    [facility]    CATARACT REMOVAL WITH IMPLANT Left 08/28/2018    [facility]    CHOLECYSTECTOMY      CORONARY ARTERY BYPASS GRAFT  8/6/13    cabg x 2     CYSTOSCOPY  04/10/2018    FIRST RIB REMOVAL      bilateral    HC INJECTION PROCEDURE FOR SACROILIAC JOINT Bilateral 11/22/2019    SACROILIAC JOINT INJECTION BILATERAL performed by Maribell Tay MD at [address] [place] [address] HYSTERECTOMY      KNEE CARTILAGE SURGERY Right     NERVE BLOCK Bilateral 2020    BILATERAL L4 TRANSFORAMIAL EPIDURAL STEROID INJECTION (Bilateral     NERVE BLOCK Left 2021    NERVE BLOCK LEFT MEDIAL BRANCH T 5 6 AND 7 performed by Joe Gilbert MD at [facility]  Bilateral 2020    BILATERAL L4 TFE performed by Joe Gilbert MD at [facility]  Bilateral 2020    BILATERAL L4 TRANSFORAMIAL EPIDURAL STEROID INJECTION performed by Joe Gilbert MD at [address] [place] [address] N/A 4/10/2018    CYSTOSCOPY performed by Jose Leblanc MD at [address] [place] [address] W/O ECP Right 2018    EYE CATARACT EMULSIFICATION IOL IMPLANT performed by Meeta Orozco MD at [address] [place] [address] W/O ECP Left 2018    EYE CATARACT EMULSIFICATION IOL IMPLANT performed by Meeta Orozco MD at [facility] Left     TONSILLECTOMY      TOTAL KNEE ARTHROPLASTY Left     TOTAL KNEE ARTHROPLASTY Right 2018    ULNAR TUNNEL RELEASE Right     VARICOSE VEIN SURGERY      bilateral         FAMILY HISTORY           Problem Relation Age of Onset    Heart Disease Father     Cancer Father         lung    Heart Disease Mother         5 stents placed    Diabetes Mother     Diabetes Sister     Rheum Arthritis Sister      Family Status   Relation Name Status    Father          lung cancer    Mother      Sister  Alive    Sister  Alive    Sister  Alive    Sister  Alive    Brother   at age less than hour old   [facility] Sister  (Not Specified)    Sister  (Not Specified)        SOCIAL HISTORY      reports that she quit smoking about 39 years ago. Her smoking use included cigarettes. She started smoking about 52 years ago. She has a 24.00 pack-year smoking history.  She has never used smokeless tobacco. She reports that she does not drink alcohol or use drugs.    REVIEW OFSYSTEMS    (2-9 systems for level 4, 10 or more for level 5)   Review of Systems    Except as noted above the remainder of the review of systems was reviewed and negative. PHYSICAL EXAM    (up to 7 for level 4, 8 or more for level 5)     ED Triage Vitals [02/26/21 1329]   BP Temp Temp Source Pulse Resp SpO2 Height Weight   117/63 97.4 °F (36.3 °C) Oral 72 18 100 % 5' 5\" (1.651 m) 201 lb 3.2 oz (91.3 kg)      Physical Exam  Constitutional:       Appearance: She is well-developed. HENT:      Head: Normocephalic and atraumatic. Neck:      Musculoskeletal: Normal range of motion and neck supple. Cardiovascular:      Rate and Rhythm: Normal rate and regular rhythm. Pulmonary:      Effort: Pulmonary effort is normal.      Breath sounds: Normal breath sounds. Abdominal:      Palpations: Abdomen is soft. Tenderness: There is abdominal tenderness in the left upper quadrant and left lower quadrant. Musculoskeletal: Normal range of motion. Skin:     General: Skin is warm. Findings: No rash. Neurological:      Mental Status: She is alert and oriented to person, place, and time.    Psychiatric:         Behavior: Behavior normal.                 DIAGNOSTIC RESULTS     EKG: All EKG's are interpreted by the Emergency Department Physician who either signs or Co-signs this chart in the absence of a cardiologist.        RADIOLOGY:   Non-plain film images such as CT, Ultrasound and MRI are read by the radiologist. Plain radiographic images arevisualized and preliminarily interpreted by the emergency physician with the below findings:        Interpretation per the Radiologist below, if available at thetime of this note:          ED BEDSIDE ULTRASOUND:   Performed by ED Physician - none    LABS:  Labs Reviewed   URINE RT REFLEX TO CULTURE - Abnormal; Notable for the following components:       Result Value    Turbidity UA SLIGHTLY CLOUDY (*)     All other components within normal limits COMPREHENSIVE METABOLIC PANEL - Abnormal; Notable for the following components:    Bun/Cre Ratio 26 (*)     ALT 41 (*)     AST 35 (*)     All other components within normal limits   MICROSCOPIC URINALYSIS - Abnormal; Notable for the following components:    Crystals, UA 20 TO 50 CALCIUM OXALATE (*)     All other components within normal limits   POCT URINALYSIS DIPSTICK - Normal   CBC WITH AUTO DIFFERENTIAL   LIPASE       All other labs were within normal range or not returned as of this dictation. EMERGENCY DEPARTMENT COURSE and DIFFERENTIAL DIAGNOSIS/MDM:   Vitals:    Vitals:    02/26/21 1329   BP: 117/63   Pulse: 72   Resp: 18   Temp: 97.4 °F (36.3 °C)   TempSrc: Oral   SpO2: 100%   Weight: 201 lb 3.2 oz (91.3 kg)   Height: 5' 5\" (1.651 m)     CT scan is negative for any acute process. Patient be treated symptomatically and discharged home. CONSULTS:  None    PROCEDURES:  Procedures        FINAL IMPRESSION      1. Left upper quadrant abdominal pain          DISPOSITION/PLAN   DISPOSITION Decision To Discharge 02/26/2021 04:05:14 PM      PATIENTREFERRED TO:   No follow-up provider specified. DISCHARGE MEDICATIONS:     Discharge Medication List as of 2/26/2021  4:31 PM      START taking these medications    Details   naproxen (NAPROSYN) 500 MG tablet Take 1 tablet by mouth 2 times daily (with meals), Disp-20 tablet, R-0Print      methocarbamol (ROBAXIN-750) 750 MG tablet Take 1 tablet by mouth 4 times daily for 10 days, Disp-40 tablet, R-0Print      acetaminophen-codeine (TYLENOL/CODEINE #3) 300-30 MG per tablet Take 1-2 tablets by mouth 3 times daily as needed for Pain for up to 3 days. , Disp-15 tablet, R-0Print                 (Please note that portions of this note were completed with a voice recognition program.  Efforts were made to edit thedictations but occasionally words are mis-transcribed.)    DELILAH Reddy PA-C  02/26/21 2023

## 2021-03-03 ENCOUNTER — HOSPITAL ENCOUNTER (OUTPATIENT)
Age: 66
Discharge: HOME | End: 2021-03-03
Payer: MEDICARE

## 2021-03-03 LAB
ANION GAP SERPL CALCULATED.3IONS-SCNC: 12 MMOL/L (ref 9–17)
BUN BLDV-MCNC: 21 MG/DL (ref 8–23)
BUN/CREAT BLD: NORMAL (ref 9–20)
CALCIUM SERPL-MCNC: 9.2 MG/DL (ref 8.6–10.4)
CHLORIDE BLD-SCNC: 102 MMOL/L (ref 98–107)
CO2: 25 MMOL/L (ref 20–31)
CREAT SERPL-MCNC: 0.87 MG/DL (ref 0.5–0.9)
GFR AFRICAN AMERICAN: >60 ML/MIN
GFR NON-AFRICAN AMERICAN: >60 ML/MIN
GFR SERPL CREATININE-BSD FRML MDRD: NORMAL ML/MIN/{1.73_M2}
GFR SERPL CREATININE-BSD FRML MDRD: NORMAL ML/MIN/{1.73_M2}
GLUCOSE BLD-MCNC: 82 MG/DL (ref 70–99)
POTASSIUM SERPL-SCNC: 4.4 MMOL/L (ref 3.7–5.3)
SODIUM BLD-SCNC: 139 MMOL/L (ref 135–144)

## 2021-03-03 PROCEDURE — 36415 COLL VENOUS BLD VENIPUNCTURE: CPT

## 2021-03-03 PROCEDURE — 80048 BASIC METABOLIC PNL TOTAL CA: CPT

## 2021-03-05 NOTE — TELEPHONE ENCOUNTER
CHIQUITA from  DME called - pt needs f/u appt w/i 6 mo of order. She will have pt call to schedule appt.

## 2021-03-09 ENCOUNTER — HOSPITAL ENCOUNTER (OUTPATIENT)
Age: 66
Setting detail: OUTPATIENT SURGERY
Discharge: HOME | End: 2021-03-09
Attending: ANESTHESIOLOGY | Admitting: ANESTHESIOLOGY
Payer: MEDICARE

## 2021-03-09 ENCOUNTER — APPOINTMENT (OUTPATIENT)
Dept: GENERAL RADIOLOGY | Age: 66
End: 2021-03-09
Attending: ANESTHESIOLOGY
Payer: MEDICARE

## 2021-03-09 VITALS
TEMPERATURE: 98.1 F | WEIGHT: 191 LBS | RESPIRATION RATE: 14 BRPM | DIASTOLIC BLOOD PRESSURE: 65 MMHG | BODY MASS INDEX: 31.82 KG/M2 | SYSTOLIC BLOOD PRESSURE: 119 MMHG | HEIGHT: 65 IN | OXYGEN SATURATION: 98 % | HEART RATE: 50 BPM

## 2021-03-09 PROCEDURE — 7100000011 HC PHASE II RECOVERY - ADDTL 15 MIN: Performed by: ANESTHESIOLOGY

## 2021-03-09 PROCEDURE — 99152 MOD SED SAME PHYS/QHP 5/>YRS: CPT | Performed by: ANESTHESIOLOGY

## 2021-03-09 PROCEDURE — 2500000003 HC RX 250 WO HCPCS: Performed by: ANESTHESIOLOGY

## 2021-03-09 PROCEDURE — 2709999900 HC NON-CHARGEABLE SUPPLY: Performed by: ANESTHESIOLOGY

## 2021-03-09 PROCEDURE — 7100000010 HC PHASE II RECOVERY - FIRST 15 MIN: Performed by: ANESTHESIOLOGY

## 2021-03-09 PROCEDURE — 3209999900 FLUORO FOR SURGICAL PROCEDURES

## 2021-03-09 PROCEDURE — 3600000050 HC PAIN LEVEL 1 BASE: Performed by: ANESTHESIOLOGY

## 2021-03-09 PROCEDURE — 6360000002 HC RX W HCPCS: Performed by: ANESTHESIOLOGY

## 2021-03-09 RX ORDER — MIDAZOLAM HYDROCHLORIDE 1 MG/ML
INJECTION INTRAMUSCULAR; INTRAVENOUS PRN
Status: DISCONTINUED | OUTPATIENT
Start: 2021-03-09 | End: 2021-03-09 | Stop reason: ALTCHOICE

## 2021-03-09 RX ORDER — SODIUM CHLORIDE, SODIUM LACTATE, POTASSIUM CHLORIDE, CALCIUM CHLORIDE 600; 310; 30; 20 MG/100ML; MG/100ML; MG/100ML; MG/100ML
INJECTION, SOLUTION INTRAVENOUS CONTINUOUS
Status: DISCONTINUED | OUTPATIENT
Start: 2021-03-09 | End: 2021-03-09 | Stop reason: HOSPADM

## 2021-03-09 RX ORDER — LIDOCAINE HYDROCHLORIDE 10 MG/ML
INJECTION, SOLUTION INFILTRATION; PERINEURAL PRN
Status: DISCONTINUED | OUTPATIENT
Start: 2021-03-09 | End: 2021-03-09 | Stop reason: ALTCHOICE

## 2021-03-09 ASSESSMENT — PAIN DESCRIPTION - PAIN TYPE: TYPE: CHRONIC PAIN

## 2021-03-09 ASSESSMENT — PAIN DESCRIPTION - DESCRIPTORS: DESCRIPTORS: ACHING

## 2021-03-09 ASSESSMENT — PAIN DESCRIPTION - FREQUENCY: FREQUENCY: CONTINUOUS

## 2021-03-09 ASSESSMENT — PAIN SCALES - GENERAL
PAINLEVEL_OUTOF10: 3
PAINLEVEL_OUTOF10: 0

## 2021-03-09 ASSESSMENT — PAIN DESCRIPTION - LOCATION: LOCATION: NECK

## 2021-03-09 NOTE — OP NOTE
Operative Note      Patient: Lb Cr  YOB: 1955  MRN: [id number]    Date of Procedure: 3/9/2021    PREOPERATIVE DIAGNOSIS:  Thoracic spondylosis without myelopathy  Thoracic spine pain        PROCEDURE:  Left thoracic median branch nerve block at the level of T5,T6 and T7 to block left facet joints at Left T6-7 and T7-8 under fluoroscopy     POSTOPERATIVE DIAGNOSIS:  Same     ESTIMATED BLOOD COUNT:    None.     COMPLICATIONS:    None.     SPECIMENS:  None.     MONITORS:  Standard ASA monitors were placed during the entire procedure and the immediate postoperative period.  The patient was communicating with us throughout the whole entire procedure.     PREPARATION FOR THE PROCEDURE:  Oxygen saturation and vital signs were monitored continuously throughout the procedure.  The patient remained awake throughout the procedure in order to interact and give feedback.  The x-ray technician was supervised and instructed to operate the fluoroscopy machine.     INFORMED CONSENT:   The risks, benefits and alternatives of the procedure were discussed with the patient.  The patient was given opportunity to ask questions regarding the procedure, its indications and the associated risks.  The risks of the procedure discussed include infection, bleeding, allergic reaction, dural puncture, headache, nerve injuries, spinal cord injury, and cardiovascular and CNS side effects with possibility of vascular entry of medications.  I also informed the patient of potential side effects or reactions to the medications potentially used during the procedure including sedatives, narcotics, nonionic contrast agents, anesthetics, and corticosteroids.  The patient was informed both verbally and in writing.  The patient understood the informed consent and desired to have the procedure performed.     PROCEDURE:  The patient was placed in the prone position on the treatment table with a pillow under the abdomen to reduce the lordosis.  The skin over and surrounding the treatment area was cleaned with chlorhexidine.  The area was covered with sterile drapes, leaving a small window opening for needle placement.  Fluoroscopy was used to identify the bony landmarks of the spine and the planned needle approach.  The skin, subcutaneous tissue, and muscle within the planned approach were anesthetized with 1% lidocaine.  With fluoroscopy, a 22-gauge spinal needle was gently guided into the region of the median branch nerves at the above levels at the level of T5,T6 and T7 to block left facet joints at Left T6-7 and T7-8 under fluoroscopy.  Specifically, each needle tip was inserted towards the superolateral aspect of the transverse process.  Needle localization was confirmed with AP, nicolle dog views and lateral radiographs.  At each level, after syringe aspiration with no blood return, 0.5 mL 1% Lidocaine was injected to anesthetize the medial branch nerve and surrounding tissue.  All injected medications were preservative-free.  Sterile technique was used throughout the procedure.  The patient tolerated the procedure well, received monitored anesthesia care, and was hemodynamically stable throughout the entire process.     POSTPROCEDURE INSTRUCTIONS:  Postprocedure vital signs and oximetry were stable.  The patient was discharged with instructions to ice the injection site as needed for 15-20 minutes, as frequently as twice per hour for the next day and to avoid aggressive activities for 1 day.  The patient was told to resume all medications.  The patient was told to be in relative rest for 1 day, but then could resume all normal activities.     The patient was instructed to seek immediate medical attention for shortness of breath, chest pain, fever, chills, increased pain, weakness, sensory or motor changes, or changes in bowel or bladder function.   Surgeon(s):  Andrew Rodríguez MD    Assistant:   * No surgical staff found *    Anesthesia: IV Sedation    Estimated Blood Loss (mL): Minimal    Complications: None    Specimens:   * No specimens in log *    Implants:  * No implants in log *      Drains: * No LDAs found *        Electronically signed by Edy Bustamante MD on 3/9/2021 at 8:01 AM

## 2021-03-09 NOTE — INTERVAL H&P NOTE
History and Physical Update    Pt Name: Latesha Cooper  MRN: [id number]  YOB: 1955  Date of evaluation: 3/9/2021      [x] I have reviewed the H&P by Emma Reeder CNP which meets the criteria for an Interval History and Physical note. [x] I have examined  Latesha Cooper  There are no changes to the patient who is scheduled for a nerve regis left medial branch T5/6/7 by Dr Joe Mejias for thoracic spondylosis. S/p same block 2/22/21 with improved comfort. Walked over 5 miles\". Hx Asthma controlled with medication. Uses CPAP nightly for MARSHAL. The patient denies new health changes, fever, chills, wheezing, cough, increased SOB, chest pain, open sores or wounds. Last ASA 81mg 3/8/21     Vital signs: /61   Pulse 52   Temp 96.8 °F (36 °C) (Temporal)   Resp 18   Ht 5' 5\" (1.651 m)   Wt 191 lb (86.6 kg)   BMI 31.78 kg/m²     Allergies:  Biaxin [clarithromycin], Ceclor [cefaclor], Duricef [cefadroxil], Medrol [methylprednisolone], Penicillins, Percocet [oxycodone-acetaminophen], Prednisone, Albuterol, Hydrocodone-acetaminophen, Isoniazid, Nickel, Norco [hydrocodone-acetaminophen], Other, Paregoric, Fentanyl, and Morphine    Medications:    Prior to Admission medications    Medication Sig Start Date End Date Taking? Authorizing Provider   [facility] THYROID PO Take by mouth   Yes Historical Provider, MD   famotidine (PEPCID) 40 MG tablet 2 times daily    Yes Historical Provider, MD   Cyanocobalamin (VITAMIN B-12) 50 MCG LOZG as directed   Yes Historical Provider, MD   Prenatal Vit-Fe Fumarate-FA (PRENATAL VITAMIN PO) Take 1 tablet by mouth daily 1/15/20  Yes Historical Provider, MD   Cholecalciferol (VITAMIN D3) 5000 units TABS Take 5,000 Units by mouth daily    Yes Historical Provider, MD   bumetanide (BUMEX) 1 MG tablet Take 1 mg by mouth three times a week Every Monday Wednesday and friday    Yes Historical Provider, MD   gabapentin (NEURONTIN) 100 MG capsule Take 100 mg by mouth 3 times daily.  .   Yes Historical Provider, MD   esomeprazole (NEXIUM) 24.65 MG CPDR capsule Take 1 capsule by mouth daily    Yes Historical Provider, MD   spironolactone (ALDACTONE) 50 MG tablet Take 50 mg by mouth daily    Yes Historical Provider, MD   levothyroxine (SYNTHROID) 25 MCG tablet Take 50 mcg by mouth Daily Take 50mcg Daily   Yes Historical Provider, MD   LORazepam (ATIVAN) 0.5 MG tablet Take 0.5 mg by mouth 2 times daily as needed. .   Yes Historical Provider, MD   aspirin 81 MG EC tablet Take 1 tablet by mouth daily. 8/8/13  Yes Breanna Santa MD         This is a 73 y.o. female who is pleasant, cooperative, alert and oriented x3, in no acute distress. Heart: Heart sounds are normal.  HR 52 asymptomatic bradycardic rate and regular rhythm, gallop or rub. Lungs: Normal respiratory effort with equal expansion, good air exchange, unlabored and clear to auscultation without wheezes or rales bilaterally   Abdomen: round, soft, nontender, nondistended with bowel sounds. Labs:  Recent Labs     03/03/21  1549 02/26/21  1418   HGB  --  13.3   HCT  --  41.3   WBC  --  5.4   MCV  --  93.7   PLT  --  155    138   K 4.4 3.9    104   CO2 25 24   BUN 21 21   CREATININE 0.87 0.82   GLUCOSE 82 85   AST  --  35*   ALT  --  41*   LABALBU  --  3.9       No results for input(s): COVID19 in the last 720 hours.     Claude BAUER, ANP-BC  Electronically signed 3/9/2021 at 7:09 AM

## 2021-03-12 ENCOUNTER — HOSPITAL ENCOUNTER (OUTPATIENT)
Dept: CT IMAGING | Age: 66
Discharge: HOME | End: 2021-03-14
Payer: MEDICARE

## 2021-03-12 DIAGNOSIS — R59.0 ANTERIOR CERVICAL LYMPHADENOPATHY: ICD-10-CM

## 2021-03-12 PROCEDURE — 2580000003 HC RX 258: Performed by: FAMILY MEDICINE

## 2021-03-12 PROCEDURE — 70491 CT SOFT TISSUE NECK W/DYE: CPT

## 2021-03-12 PROCEDURE — 6360000004 HC RX CONTRAST MEDICATION: Performed by: FAMILY MEDICINE

## 2021-03-12 RX ORDER — SODIUM CHLORIDE 0.9 % (FLUSH) 0.9 %
10 SYRINGE (ML) INJECTION PRN
Status: DISCONTINUED | OUTPATIENT
Start: 2021-03-12 | End: 2021-03-15 | Stop reason: HOSPADM

## 2021-03-12 RX ORDER — 0.9 % SODIUM CHLORIDE 0.9 %
80 INTRAVENOUS SOLUTION INTRAVENOUS ONCE
Status: COMPLETED | OUTPATIENT
Start: 2021-03-12 | End: 2021-03-12

## 2021-03-12 RX ADMIN — SODIUM CHLORIDE 80 ML: 9 INJECTION, SOLUTION INTRAVENOUS at 16:08

## 2021-03-12 RX ADMIN — IOPAMIDOL 75 ML: 755 INJECTION, SOLUTION INTRAVENOUS at 16:08

## 2021-03-12 RX ADMIN — Medication 10 ML: at 16:08

## 2021-03-23 ENCOUNTER — VIRTUAL VISIT (OUTPATIENT)
Dept: PULMONOLOGY | Age: 66
End: 2021-03-23
Payer: MEDICARE

## 2021-03-23 DIAGNOSIS — Z99.89 OSA ON CPAP: Primary | ICD-10-CM

## 2021-03-23 DIAGNOSIS — Z86.39 HISTORY OF MORBID OBESITY: ICD-10-CM

## 2021-03-23 DIAGNOSIS — J45.991 COUGH VARIANT ASTHMA: ICD-10-CM

## 2021-03-23 DIAGNOSIS — G47.33 OSA ON CPAP: Primary | ICD-10-CM

## 2021-03-23 DIAGNOSIS — J30.9 ALLERGIC RHINITIS, UNSPECIFIED SEASONALITY, UNSPECIFIED TRIGGER: ICD-10-CM

## 2021-03-23 PROCEDURE — 4040F PNEUMOC VAC/ADMIN/RCVD: CPT | Performed by: INTERNAL MEDICINE

## 2021-03-23 PROCEDURE — G8399 PT W/DXA RESULTS DOCUMENT: HCPCS | Performed by: INTERNAL MEDICINE

## 2021-03-23 PROCEDURE — 1123F ACP DISCUSS/DSCN MKR DOCD: CPT | Performed by: INTERNAL MEDICINE

## 2021-03-23 PROCEDURE — 3017F COLORECTAL CA SCREEN DOC REV: CPT | Performed by: INTERNAL MEDICINE

## 2021-03-23 PROCEDURE — 99213 OFFICE O/P EST LOW 20 MIN: CPT | Performed by: INTERNAL MEDICINE

## 2021-03-23 PROCEDURE — G8427 DOCREV CUR MEDS BY ELIG CLIN: HCPCS | Performed by: INTERNAL MEDICINE

## 2021-03-23 PROCEDURE — G8417 CALC BMI ABV UP PARAM F/U: HCPCS | Performed by: INTERNAL MEDICINE

## 2021-03-23 PROCEDURE — 1090F PRES/ABSN URINE INCON ASSESS: CPT | Performed by: INTERNAL MEDICINE

## 2021-03-23 PROCEDURE — 1036F TOBACCO NON-USER: CPT | Performed by: INTERNAL MEDICINE

## 2021-03-23 PROCEDURE — G8484 FLU IMMUNIZE NO ADMIN: HCPCS | Performed by: INTERNAL MEDICINE

## 2021-03-23 ASSESSMENT — SLEEP AND FATIGUE QUESTIONNAIRES
ESS TOTAL SCORE: 0
HOW LIKELY ARE YOU TO NOD OFF OR FALL ASLEEP WHILE LYING DOWN TO REST IN THE AFTERNOON WHEN CIRCUMSTANCES PERMIT: 0
HOW LIKELY ARE YOU TO NOD OFF OR FALL ASLEEP WHILE WATCHING TV: 0
HOW LIKELY ARE YOU TO NOD OFF OR FALL ASLEEP WHILE SITTING AND READING: 0
HOW LIKELY ARE YOU TO NOD OFF OR FALL ASLEEP IN A CAR, WHILE STOPPED FOR A FEW MINUTES IN TRAFFIC: 0

## 2021-03-23 NOTE — PATIENT INSTRUCTIONS
WILL FAX OVER ORDER FOR CPAP MACHINE TO [facility]     4/6/21Pt called office-  faxed order and office note to [facility] [phone] LS

## 2021-03-23 NOTE — PROGRESS NOTES
PULMONARY OUTPATIENT PROGRESS NOTE    Telehealth visit. Doxy. me virtual visit    Patient:  Gavin Adkins  YOB: 1955    MRN: [id number]     Acct:        Pt seen and Chart reviewed. Mr/Ms Gavin Adkins is here in followup for    Diagnosis Orders   1. MARSHAL on CPAP     2. Cough variant asthma     3. Allergic rhinitis, unspecified seasonality, unspecified trigger     4. History of morbid obesity       She is here for follow-up of history of MARSHAL/ history of chronic cough/cough variant asthma. She was seen last time in July 2020 and she had called the office for appointment this time her next appointment is in July 2021. She has been very compliant with CPAP and she is using CPAP every night until about a week ago when her CPAP machine started getting hot and there was a sign on the machine that showed the motor had exceeded the life expectancy. She had called the office and a new prescription was written but she was told by DME/insurance that she need to have a face-to-face appointment so this appointment was scheduled. She has been doing very well she is very compliant with her CPAP she use it every night she had difficulty sleeping without using CPAP. For last week or so since CPAP is not working she has been having difficulty sleeping at night she is waking up late as she does not have good sleep at night she feels more tired and fatigue during the daytime. Before that when her CPAP was working she had been doing very well she feels energetic during the daytime she did not take nap during the daytime she feels refreshed when she wake up in the morning sleep quality is good and she does not doze off during the daytime. She usually goes to sleep from 11-11 30 and wake up 8:52 in the morning. She denies frequent awakening she goes to bathroom once if any. Her compliance data is available from 12/15/2020 to 03/14/2020. Compliance is 100%. 03/03/2021 139 135 - 144 mmol/L Final     Potassium   Date Value Ref Range Status   03/03/2021 4.4 3.7 - 5.3 mmol/L Final     Chloride   Date Value Ref Range Status   03/03/2021 102 98 - 107 mmol/L Final     CO2   Date Value Ref Range Status   03/03/2021 25 20 - 31 mmol/L Final     BUN   Date Value Ref Range Status   03/03/2021 21 8 - 23 mg/dL Final     CREATININE   Date Value Ref Range Status   03/03/2021 0.87 0.50 - 0.90 mg/dL Final   02/26/2021 0.82 0.50 - 0.90 mg/dL Final   07/13/2020 0.80 0.50 - 0.90 mg/dL Final     Glucose   Date Value Ref Range Status   03/03/2021 82 70 - 99 mg/dL Final   04/23/2012 145 (H) 74 - 106 mg/dL Final     S. Calcium:   Calcium   Date Value Ref Range Status   03/03/2021 9.2 8.6 - 10.4 mg/dL Final     S. Ionized Calcium: No results found for: IONCA  S. Magnesium:   Magnesium   Date Value Ref Range Status   09/19/2018 1.8 1.6 - 2.6 mg/dL Final     S. Phosphorus: No results found for: PHOS  S. Glucose:   POC Glucose   Date Value Ref Range Status   12/10/2018 175 (H) 65 - 105 mg/dL Final   08/23/2017 130 (H) 65 - 105 mg/dL Final   08/08/2013 111 (H) 65 - 105 mg/dL Final     Glycosylated hemoglobin A1C:   Hemoglobin A1C   Date Value Ref Range Status   08/27/2020 4.9 4.0 - 6.0 % Final       Pulmonary Functions Testing Results:    3/24/2015: FEV1 2.92  119%, FVC 3.48 105 %, LUE7ZOT 113%, TLC 5.15 96.7%, DLCO 20.78 106%    Blood Gases:   pH   Date Value Ref Range Status   08/06/2013 7.36  Final     PCO2   Date Value Ref Range Status   08/06/2013 42 mm Hg Final     PO2   Date Value Ref Range Status   08/06/2013 99 mm Hg Final     HCO3   Date Value Ref Range Status   08/06/2013 24.0 23 - 31 mmol/L Final     O2 Sat   Date Value Ref Range Status   08/06/2013 97 % Final       Radiological reports:    CXR   2/28/17  Lung parenchyma is clear without focal   airspace consolidation, sizeable pleural effusion, or pneumothorax       2/9/2016  Sternotomy wires are present.  The heart appears normal therapy        RTC as scheduled in July 2021    Please note that this chart was generated using voice recognition Dragon dictation software. Although every effort was made to ensure the accuracy of this automated transcription, some errors in transcription may have occurred. Rafita Roberson MD             3/23/2021, 11:53 AM     Price Prince is a 73 y.o. female being evaluated by a Virtual Visit (video/telephone visit) encounter to address concerns as mentioned above. A caregiver was present when appropriate. Due to this being a TeleHealth encounter (During GQQWT-65 public health emergency), evaluation of the following organ systems was limited: Vitals/Constitutional/EENT/Resp/CV/GI//MS/Neuro/Skin/Heme-Lymph-Imm. Pursuant to the emergency declaration under the [address] [place] [address], [address] [place] [address] authority and the [employer] Act, this Virtual Visit was conducted with patient's (and/or legal guardian's) consent, to reduce the patient's risk of exposure to COVID-19 and provide necessary medical care. The patient (and/or legal guardian) has also been advised to contact this office for worsening conditions or problems, and seek emergency medical treatment and/or call 911 if deemed necessary. Patient identification was verified at the start of the visit: Yes  Total time spent for this encounter: 23 minutes  Services were provided through a video/telephone synchronous discussion virtually to substitute for in-person clinic visit. Patient and provider were located at their individual locations at home and office respectively. --Rafita Roberson MD on 3/23/2021 at 11:58 AM    An electronic signature was used to authenticate this note.

## 2021-03-31 ENCOUNTER — HOSPITAL ENCOUNTER (OUTPATIENT)
Age: 66
Discharge: HOME | End: 2021-04-02
Payer: MEDICARE

## 2021-03-31 ENCOUNTER — HOSPITAL ENCOUNTER (OUTPATIENT)
Dept: GENERAL RADIOLOGY | Age: 66
Discharge: HOME | End: 2021-04-02
Payer: MEDICARE

## 2021-03-31 DIAGNOSIS — M54.6 PAIN IN THORACIC SPINE: ICD-10-CM

## 2021-03-31 PROCEDURE — 72072 X-RAY EXAM THORAC SPINE 3VWS: CPT

## 2021-04-06 ENCOUNTER — TELEPHONE (OUTPATIENT)
Dept: PULMONOLOGY | Age: 66
End: 2021-04-06

## 2021-04-06 NOTE — TELEPHONE ENCOUNTER
Patient called stating she never received her new order for cpap. Stated it should go to [facility].  Called [facility] and faxed over [phone] -[facility]

## 2021-04-30 ENCOUNTER — OFFICE VISIT (OUTPATIENT)
Dept: PODIATRY | Age: 66
End: 2021-04-30
Payer: MEDICARE

## 2021-04-30 VITALS — WEIGHT: 191 LBS | BODY MASS INDEX: 31.82 KG/M2 | HEIGHT: 65 IN

## 2021-04-30 DIAGNOSIS — D23.72 BENIGN NEOPLASM OF SKIN OF LEFT FOOT: Primary | ICD-10-CM

## 2021-04-30 DIAGNOSIS — M79.605 PAIN OF LEFT LOWER EXTREMITY: ICD-10-CM

## 2021-04-30 PROCEDURE — 99213 OFFICE O/P EST LOW 20 MIN: CPT | Performed by: PODIATRIST

## 2021-04-30 PROCEDURE — 1123F ACP DISCUSS/DSCN MKR DOCD: CPT | Performed by: PODIATRIST

## 2021-04-30 PROCEDURE — G8427 DOCREV CUR MEDS BY ELIG CLIN: HCPCS | Performed by: PODIATRIST

## 2021-04-30 PROCEDURE — 4040F PNEUMOC VAC/ADMIN/RCVD: CPT | Performed by: PODIATRIST

## 2021-04-30 PROCEDURE — G8399 PT W/DXA RESULTS DOCUMENT: HCPCS | Performed by: PODIATRIST

## 2021-04-30 PROCEDURE — 1090F PRES/ABSN URINE INCON ASSESS: CPT | Performed by: PODIATRIST

## 2021-04-30 PROCEDURE — 1036F TOBACCO NON-USER: CPT | Performed by: PODIATRIST

## 2021-04-30 PROCEDURE — 17110 DESTRUCTION B9 LES UP TO 14: CPT | Performed by: PODIATRIST

## 2021-04-30 PROCEDURE — G8417 CALC BMI ABV UP PARAM F/U: HCPCS | Performed by: PODIATRIST

## 2021-04-30 PROCEDURE — 3017F COLORECTAL CA SCREEN DOC REV: CPT | Performed by: PODIATRIST

## 2021-04-30 NOTE — PROGRESS NOTES
[facility] PHYSICIANS  [facility]  [address] [place] [address]  Dept: [phone]  Dept Fax: [phone]    RETURN PATIENT PROGRESS NOTE  Date of patient's visit: 4/30/2021  Patient's Name:  Eleazar Olmstead YOB: 1955            Patient Care Team:  Christiano Gary as PCP - Mary Villavicencio MD as Surgeon (Cardiothoracic Surgery)  Jeannie Cutler MD as Consulting Physician (Pulmonology)  Jackson Escudero DPM as Physician (Podiatry)       [facility] 73 y.o. female that presents for follow-up of   Chief Complaint   Patient presents with    Foot Pain     left foot x 3 months     Pt's primary care physician is Christiano Gary last seen April 30 2021  Symptoms began 3 month(s) ago and are increased . Patient relates pain is Present to left foot skin lesion. Pain is rated 6 out of 10 and is described as intermittent. Treatments prior to today's visit include: aspercreme, voltaren pain cream and bengay. Currently denies F/C/N/V. Allergies   Allergen Reactions    Biaxin [Clarithromycin] Hives    Ceclor [Cefaclor] Hives    Duricef [Cefadroxil] Hives    Medrol [Methylprednisolone] Other (See Comments)     Swelling of tongue.  Penicillins Hives    Percocet [Oxycodone-Acetaminophen] Shortness Of Breath     Only the     Prednisone Swelling     IV and oral. Makes tongue swell.  Albuterol Other (See Comments)     Burning in chest  Burning in chest    Hydrocodone-Acetaminophen Hives    Isoniazid     Nickel      Other reaction(s):  Allergy: RASH;    Norco [Hydrocodone-Acetaminophen] Hives    Other      Other reaction(s): Unknown    Paregoric Hives    Fentanyl Nausea And Vomiting    Morphine Nausea And Vomiting       Past Medical History:   Diagnosis Date    Allergic rhinitis     Arthritis     Asthma     Bunion of left foot     CAD (coronary artery disease) 8/6/13    cabg x 2    Chlorine inhalation lung injury ([facility] [address]     Severe irritability and airway reactivity     Chronic back pain     Cough variant asthma     Dyslipidemia     Dyspnea on exertion     Eczema     GERD (gastroesophageal reflux disease)     Headache     MIGRAINES    MVP (mitral valve prolapse)     Obesity     Morbid obesity with BMI of 50.0-59.9, adult    MARSHAL on CPAP     Poison ivy     States in lungs    Sleep apnea     COMPLIANT ON CPAP     Thoracic outlet syndrome     bilateral    Thyroid disease        Prior to Admission medications    Medication Sig Start Date End Date Taking? Authorizing Provider   [facility] THYROID PO Take by mouth   Yes Historical Provider, MD   famotidine (PEPCID) 40 MG tablet 2 times daily    Yes Historical Provider, MD   Cyanocobalamin (VITAMIN B-12) 50 MCG LOZG as directed   Yes Historical Provider, MD   Prenatal Vit-Fe Fumarate-FA (PRENATAL VITAMIN PO) Take 1 tablet by mouth daily 1/15/20  Yes Historical Provider, MD   Cholecalciferol (VITAMIN D3) 5000 units TABS Take 5,000 Units by mouth daily    Yes Historical Provider, MD   bumetanide (BUMEX) 1 MG tablet Take 1 mg by mouth three times a week Every Monday Wednesday and friday    Yes Historical Provider, MD   gabapentin (NEURONTIN) 100 MG capsule Take 100 mg by mouth 3 times daily. .   Yes Historical Provider, MD   esomeprazole (NEXIUM) 24.65 MG CPDR capsule Take 1 capsule by mouth daily    Yes Historical Provider, MD   spironolactone (ALDACTONE) 50 MG tablet Take 50 mg by mouth daily    Yes Historical Provider, MD   levothyroxine (SYNTHROID) 25 MCG tablet Take 50 mcg by mouth Daily Take 50mcg Daily   Yes Historical Provider, MD   LORazepam (ATIVAN) 0.5 MG tablet Take 0.5 mg by mouth 2 times daily as needed. .   Yes Historical Provider, MD   aspirin 81 MG EC tablet Take 1 tablet by mouth daily.  8/8/13  Yes Theresa Reynolds MD       Review of Systems    Review of Systems:  History obtained from chart review and the patient  General ROS: negative for - chills, fatigue, fever, night sweats or weight gain Constitutional: Negative for chills, diaphoresis, fatigue, fever and unexpected weight change. Musculoskeletal: Positive for arthralgias, gait problem and joint swelling. Neurological ROS: negative for - behavioral changes, confusion, headaches or seizures. Negative for weakness and numbness. Dermatological ROS: negative for - mole changes, rash  Cardiovascular: Negative for leg swelling. Gastrointestinal: Negative for constipation, diarrhea, nausea and vomiting. Lower Extremity Physical Examination:     Vitals: There were no vitals filed for this visit. General: AAO x 3 in NAD. Dermatologic Exam:  Soft tissue lesion to the plantar left foot sub 5th MTH with central core and petechiae. Pain on palpation of lesion. Musculoskeletal:     1st MPJ ROM decreased, Bilateral.  Muscle strength 5/5, Bilateral.  Pain present upon palpation of skin lesion, left. Medial longitudinal arch, Bilateral WNL. Ankle ROM WNL,Bilateral.    Dorsally contracted digits absent digits 1-5 Bilateral.     Vascular: DP and PT pulses palpable 2/4, Bilateral.  CFT <3 seconds, Bilateral.  Hair growth present to the level of the digits, Bilateral.  Edema absent, Bilateral.  Varicosities absent, Bilateral. Erythema absent, Bilateral    Neurological: Sensation intact to light touch to level of digits, Bilateral.  Protective sensation intact 10/10 sites via 5.07/10g [place]-Sam Monofilament, Bilateral.  negative Tinel's, Bilateral.  negative Valleix sign, Bilateral.      Integument: Warm, dry, supple, Bilateral.  Open lesion absent, Bilateral.  Interdigital maceration absent to web spaces 1-4, Bilateral.  Nails are normal in length, thickness and color 1-5 bilateral.  Fissures absent, Bilateral.       Asessment: Patient is a 73 y.o. female with:    Diagnosis Orders   1. Benign neoplasm of skin of left foot  15631 - [place] DESTRUCTION BENIGN LESIONS UP TO 14   2.  Pain of left lower extremity  72683 - [place] DESTRUCTION BENIGN LESIONS UP TO 14         Plan: Patient examined and evaluated. Current condition and treatment options discussed in detail. The lesion was partially excised and Pyrogallic acid was applied under occlusion. The patient will leave in place for 24-48 hours and than remove. The patient tolerated the procedure well and without complication. All labs were reviewed and all imagining including the above findings were reviewed PRIOR to the patients arrival and with the patient today. Previous patient encounter was reviewed. Encounters from the patients other medical providers were reviewed and noted. Time was spent educating the patient and their families/caregivers on proper care of the feet and ankles. All the above diagnosis were addressed at todays visit and all questions were answered. A total of 25 minutes was spent with this patients encounter which included charting after the patients visit      Verbal and written instructions given to patient. Contact office with any questions/problems/concerns. No orders of the defined types were placed in this encounter. No orders of the defined types were placed in this encounter. RTC in 2week(s).     4/30/2021      Electronically signed by Shahrzad Mcguire DPM on 4/30/2021 at 10:24 AM  4/30/2021

## 2021-05-10 ENCOUNTER — APPOINTMENT (OUTPATIENT)
Dept: GENERAL RADIOLOGY | Age: 66
End: 2021-05-10
Attending: ANESTHESIOLOGY
Payer: MEDICARE

## 2021-05-10 ENCOUNTER — HOSPITAL ENCOUNTER (OUTPATIENT)
Age: 66
Setting detail: OUTPATIENT SURGERY
Discharge: HOME | End: 2021-05-10
Attending: ANESTHESIOLOGY | Admitting: ANESTHESIOLOGY
Payer: MEDICARE

## 2021-05-10 VITALS
BODY MASS INDEX: 32.49 KG/M2 | OXYGEN SATURATION: 98 % | HEIGHT: 65 IN | WEIGHT: 195 LBS | SYSTOLIC BLOOD PRESSURE: 112 MMHG | RESPIRATION RATE: 16 BRPM | HEART RATE: 58 BPM | DIASTOLIC BLOOD PRESSURE: 87 MMHG | TEMPERATURE: 97.9 F

## 2021-05-10 PROCEDURE — 7100000010 HC PHASE II RECOVERY - FIRST 15 MIN: Performed by: ANESTHESIOLOGY

## 2021-05-10 PROCEDURE — 2709999900 HC NON-CHARGEABLE SUPPLY: Performed by: ANESTHESIOLOGY

## 2021-05-10 PROCEDURE — 6360000002 HC RX W HCPCS: Performed by: ANESTHESIOLOGY

## 2021-05-10 PROCEDURE — 3600000055 HC PAIN LEVEL 3 ADDL 15 MIN: Performed by: ANESTHESIOLOGY

## 2021-05-10 PROCEDURE — 3600000054 HC PAIN LEVEL 3 BASE: Performed by: ANESTHESIOLOGY

## 2021-05-10 PROCEDURE — 7100000011 HC PHASE II RECOVERY - ADDTL 15 MIN: Performed by: ANESTHESIOLOGY

## 2021-05-10 PROCEDURE — 99152 MOD SED SAME PHYS/QHP 5/>YRS: CPT | Performed by: ANESTHESIOLOGY

## 2021-05-10 PROCEDURE — 2500000003 HC RX 250 WO HCPCS: Performed by: ANESTHESIOLOGY

## 2021-05-10 PROCEDURE — 2580000003 HC RX 258: Performed by: ANESTHESIOLOGY

## 2021-05-10 PROCEDURE — 3209999900 FLUORO FOR SURGICAL PROCEDURES

## 2021-05-10 RX ORDER — SODIUM CHLORIDE, SODIUM LACTATE, POTASSIUM CHLORIDE, CALCIUM CHLORIDE 600; 310; 30; 20 MG/100ML; MG/100ML; MG/100ML; MG/100ML
INJECTION, SOLUTION INTRAVENOUS CONTINUOUS
Status: DISCONTINUED | OUTPATIENT
Start: 2021-05-10 | End: 2021-05-10 | Stop reason: HOSPADM

## 2021-05-10 RX ORDER — MIDAZOLAM HYDROCHLORIDE 1 MG/ML
INJECTION INTRAMUSCULAR; INTRAVENOUS PRN
Status: DISCONTINUED | OUTPATIENT
Start: 2021-05-10 | End: 2021-05-10 | Stop reason: ALTCHOICE

## 2021-05-10 RX ORDER — BUPIVACAINE HYDROCHLORIDE 2.5 MG/ML
INJECTION, SOLUTION EPIDURAL; INFILTRATION; INTRACAUDAL PRN
Status: DISCONTINUED | OUTPATIENT
Start: 2021-05-10 | End: 2021-05-10 | Stop reason: ALTCHOICE

## 2021-05-10 RX ADMIN — SODIUM CHLORIDE, POTASSIUM CHLORIDE, SODIUM LACTATE AND CALCIUM CHLORIDE: 600; 310; 30; 20 INJECTION, SOLUTION INTRAVENOUS at 06:34

## 2021-05-10 ASSESSMENT — PAIN DESCRIPTION - DESCRIPTORS: DESCRIPTORS: ACHING;CONSTANT;CRUSHING;DISCOMFORT;DULL

## 2021-05-10 ASSESSMENT — PAIN DESCRIPTION - PAIN TYPE: TYPE: CHRONIC PAIN

## 2021-05-10 ASSESSMENT — PAIN DESCRIPTION - LOCATION
LOCATION: BACK
LOCATION: BACK

## 2021-05-10 ASSESSMENT — PAIN DESCRIPTION - ORIENTATION: ORIENTATION: RIGHT

## 2021-05-10 ASSESSMENT — PAIN SCALES - GENERAL
PAINLEVEL_OUTOF10: 4
PAINLEVEL_OUTOF10: 4

## 2021-05-10 ASSESSMENT — PAIN - FUNCTIONAL ASSESSMENT: PAIN_FUNCTIONAL_ASSESSMENT: 0-10

## 2021-05-10 NOTE — H&P
LENS PROSTH W/O ECP Left 08/28/2018    EYE CATARACT EMULSIFICATION IOL IMPLANT performed by Benito Wong MD at [address] Left     TONSILLECTOMY      TOTAL KNEE ARTHROPLASTY Left     TOTAL KNEE ARTHROPLASTY Right 03/2018    ULNAR TUNNEL RELEASE Right     VARICOSE VEIN SURGERY      bilateral        Social History:     Tobacco:    reports that she quit smoking about 40 years ago. Her smoking use included cigarettes. She started smoking about 52 years ago. She has a 24.00 pack-year smoking history. She has never used smokeless tobacco.  Alcohol:      reports no history of alcohol use. Drug Use:  reports no history of drug use. Family History:     Family History   Problem Relation Age of Onset    Heart Disease Father     Cancer Father         lung    Heart Disease Mother         5 stents placed    Diabetes Mother     Diabetes Sister     Rheum Arthritis Sister        Medication History:     Prior to Admission medications    Medication Sig Start Date End Date Taking? Authorizing Provider   [facility] THYROID PO Take by mouth   Yes Historical Provider, MD   famotidine (PEPCID) 40 MG tablet 2 times daily    Yes Historical Provider, MD   bumetanide (BUMEX) 1 MG tablet Take 1 mg by mouth three times a week Every Monday Wednesday and friday    Yes Historical Provider, MD   gabapentin (NEURONTIN) 100 MG capsule Take 100 mg by mouth 3 times daily. .   Yes Historical Provider, MD   esomeprazole (NEXIUM) 24.65 MG CPDR capsule Take 1 capsule by mouth daily    Yes Historical Provider, MD   spironolactone (ALDACTONE) 50 MG tablet Take 50 mg by mouth daily    Yes Historical Provider, MD   levothyroxine (SYNTHROID) 25 MCG tablet Take 50 mcg by mouth Daily Take 50mcg Daily   Yes Historical Provider, MD   LORazepam (ATIVAN) 0.5 MG tablet Take 0.5 mg by mouth 2 times daily as needed.  .   Yes Historical Provider, MD   Cyanocobalamin (VITAMIN B-12) 50 MCG LOZG as directed    Historical Provider, MD   Prenatal Vit-Fe Fumarate-FA (PRENATAL VITAMIN PO) Take 1 tablet by mouth daily 1/15/20   Historical Provider, MD   Cholecalciferol (VITAMIN D3) 5000 units TABS Take 5,000 Units by mouth daily     Historical Provider, MD   aspirin 81 MG EC tablet Take 1 tablet by mouth daily. 8/8/13   Michelle Marx MD         This is a 73 y.o. female who is pleasant, cooperative, alert and oriented x 3, in no acute distress. Obese. Heart: Regular rate and rhythm without murmur, gallop, or rub. Lungs: Normal respiratory effort, unlabored, and clear to auscultation without wheezes or rales bilaterally. Abdomen: Soft, nontender, nondistended with active bowel sounds. Pedal pulses: 2+ bilaterally. Labs:  No results for input(s): HGB, HCT, WBC, MCV, PLT, NA, K, CL, CO2, BUN, CREATININE, GLUCOSE, INR, PROTIME, APTT, AST, ALT, LABALBU, HCG in the last 720 hours. No results for input(s): COVID19 in the last 720 hours.       Angie BAUER, FNP-BC  Electronically signed 5/10/2021 at 6:42 AM

## 2021-05-10 NOTE — OP NOTE
Operative Note      Patient: Berenice Angel  YOB: 1955  MRN: [id number]    Date of Procedure: 5/10/2021  PREOPERATIVE DIAGNOSIS:    Thoracic spondylosis without myelopathy  Thoracic spine pain    PROCEDURE:    Left radiofrequency thermocoagulation of the median branch nerve at the level of thoracic level T5,T6 and T7 to block the facet joint at the level of thoracic facet left T6-7 and T7-8 under fluoroscopy    POSTOPERATIVE DIAGNOSIS:    Same      ESTIMATED BLOOD LOSS:  None    COMPLICATIONS:  None    SPECIMEN:  None    MONITORS:    Standard ASA monitors was placed during the entire procedure and the immediate postoperative period. The patient was communicating with us throughout the whole entire procedure. INFORMED CONSENT:    The risks, benefits and alternatives of the procedure were discussed with the patient. The patient was given opportunity to ask questions regarding the procedure, its indications and the associated risks. The risk of the procedure discussed include infection, bleeding, allergic reaction, dural puncture, headache, nerve injuries, spinal cord injury, and cardiovascular and CNS side effects with possible of vascular entry of medications. I also informed the patient of potential side effects or reactions to the medications potentially used during the procedure including sedatives, narcotics, nonionic contrast agents, anesthetics, and corticosteroids. The patient was informed both verbally and in writing. The patient understood the informed consent and desired to have the procedure performed. PROCEDURE:   The patient reported 80% improvement of pain after the diagnostic median branch nerve block that lasted for the duration of local anesthesia. The patient remained awake throughout the procedure in order to interact and give feedback. The x-ray technician was supervised and instructed to operate the fluoroscopy machine.   The patient was placed in the prone position on the treatment table with a pillow under the abdomen to reduce the natural lumbar lordosis. The skin over and surrounding the treatment area was cleaned with chlorhexidine. The area was covered with sterile drapes, leaving a small window opening for needle placement. Fluoroscopy was used to identify the bony landmarks of the spine and the planned needle approach. The skin, subcutaneous tissue, and muscle within the planned approach were anesthetized with bupivacaine 0.25%. With fluoroscopy, a 22 G, 5-mm Active Tip needle was gently guided into the region of the medial branch nerves from the dorsal ramus of the above levels at the level of thoracic level T5,T6 and T7 to block the facet joint at the level of thoracic facet left T6-7 and T7-8 under fluoroscopy. Specifically, each needle tip was inserted to the superior and lateral border of the transverse process. Needle localization was confirmed with AP, nicolle dog views and lateral radiographs. The following technique was used to confirm placement at the medial branch nerves. Sensory stimulation was applied to each level at 50 Hz; paresthesias were noted below 0.6 millivolts. Motor stimulation was applied at 2 Hz with 1 millisecond duration; corresponding paraspinal muscle twitching without extremity movement was noted. Following this, the needle trocar was removed and a syringe containing 0.25% bupivacaine was attached. At each level, after syringe aspiration with no blood return, 1mL 0.25% bupivacaine was injected to anesthetize the medial branch nerve and surrounding tissue. After completion of each nerve block a lesion was created at that level with a temperature of 80 degrees Celsius for 90 seconds x 2 lesions. All injected medications were preservative-free. Sterile technique was used throughout the procedure. The patient tolerated the procedure well, received monitored anesthesia care, and was hemodynamically stable throughout the entire process. POSTPROCEDURE INSTRUCTIONS:  Postprocedure vital signs and oximetry were stable. The patient was discharged with instructions to ice the injection site as needed for 15-20 minutes, as frequently as twice per hour, for the next day and to avoid aggressive activities for 1 day. The patient was told to resume all medications. The patient was told to be in relative rest for 1 day but then could resume all normal activities. The patient was instructed to seek immediate medical attention for shortness of breath, chest pain, fever, chills, increased pain, weakness, sensory or motor changes, or changes in bowel or bladder function.     Surgeon(s):  Michelle Anderson MD    Assistant:   * No surgical staff found *    Anesthesia: IV Sedation    Estimated Blood Loss (mL): Minimal    Complications: None    Specimens:   * No specimens in log *    Implants:  * No implants in log *      Drains: * No LDAs found *      Electronically signed by María Boykin MD on 5/10/2021 at 7:39 AM

## 2021-05-12 ENCOUNTER — OFFICE VISIT (OUTPATIENT)
Dept: PODIATRY | Age: 66
End: 2021-05-12
Payer: MEDICARE

## 2021-05-12 VITALS — HEIGHT: 65 IN | WEIGHT: 197 LBS | BODY MASS INDEX: 32.82 KG/M2

## 2021-05-12 DIAGNOSIS — M79.605 PAIN OF LEFT LOWER EXTREMITY: ICD-10-CM

## 2021-05-12 DIAGNOSIS — D23.72 BENIGN NEOPLASM OF SKIN OF LEFT FOOT: Primary | ICD-10-CM

## 2021-05-12 PROCEDURE — G8427 DOCREV CUR MEDS BY ELIG CLIN: HCPCS | Performed by: PODIATRIST

## 2021-05-12 PROCEDURE — 99213 OFFICE O/P EST LOW 20 MIN: CPT | Performed by: PODIATRIST

## 2021-05-12 PROCEDURE — 3017F COLORECTAL CA SCREEN DOC REV: CPT | Performed by: PODIATRIST

## 2021-05-12 PROCEDURE — 1090F PRES/ABSN URINE INCON ASSESS: CPT | Performed by: PODIATRIST

## 2021-05-12 PROCEDURE — 1123F ACP DISCUSS/DSCN MKR DOCD: CPT | Performed by: PODIATRIST

## 2021-05-12 PROCEDURE — 4040F PNEUMOC VAC/ADMIN/RCVD: CPT | Performed by: PODIATRIST

## 2021-05-12 PROCEDURE — G8417 CALC BMI ABV UP PARAM F/U: HCPCS | Performed by: PODIATRIST

## 2021-05-12 PROCEDURE — 1036F TOBACCO NON-USER: CPT | Performed by: PODIATRIST

## 2021-05-12 PROCEDURE — G8399 PT W/DXA RESULTS DOCUMENT: HCPCS | Performed by: PODIATRIST

## 2021-05-19 NOTE — PROGRESS NOTES
[facility]YSICIANS  [facility]  [address] [place] [address]  Dept: [phone]  Dept Fax: [phone]    RETURN PATIENT PROGRESS NOTE  Date of patient's visit: 5/19/2021  Patient's Name:  Elizabeth Mao YOB: 1955            Patient Care Team:  Jess Roy as PCP - Jayshree Stallings MD as Surgeon (Cardiothoracic Surgery)  Elizabeth Boss MD as Consulting Physician (Pulmonology)  Bertha Hernandez DPM as Physician (Podiatry)       Elizabeth Mao 73 y.o. female that presents for follow-up of   Chief Complaint   Patient presents with    Benign Neoplasm     Pt's primary care physician is Jess Roy last seen April 30 2021  Symptoms began 3 month(s) ago and are decreased. Patient relates pain is Present but much improved to left foot skin lesion. Pain is rated 1 out of 10 and is described as intermittent. Treatments prior to today's visit include: cantharidin  Currently denies F/C/N/V. Allergies   Allergen Reactions    Biaxin [Clarithromycin] Hives    Ceclor [Cefaclor] Hives    Duricef [Cefadroxil] Hives    Medrol [Methylprednisolone] Other (See Comments)     Swelling of tongue.  Penicillins Hives    Percocet [Oxycodone-Acetaminophen] Shortness Of Breath     Only the     Prednisone Swelling     IV and oral. Makes tongue swell.  Albuterol Other (See Comments)     Burning in chest  Burning in chest    Hydrocodone-Acetaminophen Hives    Isoniazid     Nickel      Other reaction(s):  Allergy: RASH;    Norco [Hydrocodone-Acetaminophen] Hives    Other      Other reaction(s): Unknown    Paregoric Hives    Fentanyl Nausea And Vomiting    Morphine Nausea And Vomiting       Past Medical History:   Diagnosis Date    Allergic rhinitis     Arthritis     Asthma     Bunion of left foot     CAD (coronary artery disease) 8/6/13    cabg x 2    Chlorine inhalation lung injury ([facility] [address]     Severe irritability and airway reactivity     Chronic cream to soften skin to marya feet once daily. As symptoms have resolved, pt to closely monitor. All labs were reviewed and all imagining including the above findings were reviewed PRIOR to the patients arrival and with the patient today. Previous patient encounter was reviewed. Encounters from the patients other medical providers were reviewed and noted. Time was spent educating the patient and their families/caregivers on proper care of the feet and ankles. All the above diagnosis were addressed at todays visit and all questions were answered. A total of 25 minutes was spent with this patients encounter which included charting after the patients visit      Verbal and written instructions given to patient. Contact office with any questions/problems/concerns. No orders of the defined types were placed in this encounter. No orders of the defined types were placed in this encounter. RTC in PRN.     5/12/2021      Electronically signed by Daya Cheney DPM on 5/19/2021 at 1:45 PM  5/12/2021

## 2021-06-14 ENCOUNTER — HOSPITAL ENCOUNTER (OUTPATIENT)
Age: 66
Discharge: HOME | End: 2021-06-14
Payer: MEDICARE

## 2021-06-14 LAB
ALBUMIN SERPL-MCNC: 4 G/DL (ref 3.5–5.2)
ALBUMIN/GLOBULIN RATIO: 1.4 (ref 1–2.5)
ALP BLD-CCNC: 108 U/L (ref 35–104)
ALT SERPL-CCNC: 30 U/L (ref 5–33)
ANION GAP SERPL CALCULATED.3IONS-SCNC: 13 MMOL/L (ref 9–17)
AST SERPL-CCNC: 30 U/L
BILIRUB SERPL-MCNC: 0.37 MG/DL (ref 0.3–1.2)
BUN BLDV-MCNC: 20 MG/DL (ref 8–23)
BUN/CREAT BLD: ABNORMAL (ref 9–20)
CALCIUM SERPL-MCNC: 9 MG/DL (ref 8.6–10.4)
CHLORIDE BLD-SCNC: 103 MMOL/L (ref 98–107)
CO2: 25 MMOL/L (ref 20–31)
CREAT SERPL-MCNC: 0.89 MG/DL (ref 0.5–0.9)
CREATININE URINE: 163.3 MG/DL (ref 28–217)
ESTIMATED AVERAGE GLUCOSE: 88 MG/DL
GFR AFRICAN AMERICAN: >60 ML/MIN
GFR NON-AFRICAN AMERICAN: >60 ML/MIN
GFR SERPL CREATININE-BSD FRML MDRD: ABNORMAL ML/MIN/{1.73_M2}
GFR SERPL CREATININE-BSD FRML MDRD: ABNORMAL ML/MIN/{1.73_M2}
GLUCOSE BLD-MCNC: 83 MG/DL (ref 70–99)
HBA1C MFR BLD: 4.7 % (ref 4–6)
MICROALBUMIN/CREAT 24H UR: <12 MG/L
MICROALBUMIN/CREAT UR-RTO: NORMAL MCG/MG CREAT
POTASSIUM SERPL-SCNC: 4.3 MMOL/L (ref 3.7–5.3)
SODIUM BLD-SCNC: 141 MMOL/L (ref 135–144)
T3 FREE: 3.16 PG/ML (ref 2.02–4.43)
THYROXINE, FREE: 1.16 NG/DL (ref 0.93–1.7)
TOTAL PROTEIN: 6.9 G/DL (ref 6.4–8.3)
TSH SERPL DL<=0.05 MIU/L-ACNC: 0.68 MIU/L (ref 0.3–5)
VITAMIN D 25-HYDROXY: 56.6 NG/ML (ref 30–100)

## 2021-06-14 PROCEDURE — 83036 HEMOGLOBIN GLYCOSYLATED A1C: CPT

## 2021-06-14 PROCEDURE — 82570 ASSAY OF URINE CREATININE: CPT

## 2021-06-14 PROCEDURE — 82043 UR ALBUMIN QUANTITATIVE: CPT

## 2021-06-14 PROCEDURE — 82306 VITAMIN D 25 HYDROXY: CPT

## 2021-06-14 PROCEDURE — 36415 COLL VENOUS BLD VENIPUNCTURE: CPT

## 2021-06-14 PROCEDURE — 84443 ASSAY THYROID STIM HORMONE: CPT

## 2021-06-14 PROCEDURE — 80053 COMPREHEN METABOLIC PANEL: CPT

## 2021-06-14 PROCEDURE — 84439 ASSAY OF FREE THYROXINE: CPT

## 2021-06-14 PROCEDURE — 84481 FREE ASSAY (FT-3): CPT

## 2021-07-20 ENCOUNTER — OFFICE VISIT (OUTPATIENT)
Dept: PULMONOLOGY | Age: 66
End: 2021-07-20
Payer: MEDICARE

## 2021-07-20 VITALS
SYSTOLIC BLOOD PRESSURE: 104 MMHG | RESPIRATION RATE: 18 BRPM | WEIGHT: 199.8 LBS | BODY MASS INDEX: 33.29 KG/M2 | TEMPERATURE: 97.2 F | OXYGEN SATURATION: 98 % | HEART RATE: 64 BPM | HEIGHT: 65 IN | DIASTOLIC BLOOD PRESSURE: 61 MMHG

## 2021-07-20 DIAGNOSIS — Z99.89 OSA ON CPAP: Primary | ICD-10-CM

## 2021-07-20 DIAGNOSIS — G47.33 OSA ON CPAP: Primary | ICD-10-CM

## 2021-07-20 DIAGNOSIS — J30.9 ALLERGIC RHINITIS, UNSPECIFIED SEASONALITY, UNSPECIFIED TRIGGER: ICD-10-CM

## 2021-07-20 DIAGNOSIS — J45.991 COUGH VARIANT ASTHMA: ICD-10-CM

## 2021-07-20 DIAGNOSIS — Z86.39 HISTORY OF MORBID OBESITY: ICD-10-CM

## 2021-07-20 PROCEDURE — 1036F TOBACCO NON-USER: CPT | Performed by: INTERNAL MEDICINE

## 2021-07-20 PROCEDURE — 1090F PRES/ABSN URINE INCON ASSESS: CPT | Performed by: INTERNAL MEDICINE

## 2021-07-20 PROCEDURE — G8417 CALC BMI ABV UP PARAM F/U: HCPCS | Performed by: INTERNAL MEDICINE

## 2021-07-20 PROCEDURE — 4040F PNEUMOC VAC/ADMIN/RCVD: CPT | Performed by: INTERNAL MEDICINE

## 2021-07-20 PROCEDURE — G8399 PT W/DXA RESULTS DOCUMENT: HCPCS | Performed by: INTERNAL MEDICINE

## 2021-07-20 PROCEDURE — 99213 OFFICE O/P EST LOW 20 MIN: CPT | Performed by: INTERNAL MEDICINE

## 2021-07-20 PROCEDURE — G8428 CUR MEDS NOT DOCUMENT: HCPCS | Performed by: INTERNAL MEDICINE

## 2021-07-20 PROCEDURE — 1123F ACP DISCUSS/DSCN MKR DOCD: CPT | Performed by: INTERNAL MEDICINE

## 2021-07-20 PROCEDURE — 3017F COLORECTAL CA SCREEN DOC REV: CPT | Performed by: INTERNAL MEDICINE

## 2021-07-20 RX ORDER — DIAZEPAM 5 MG/1
TABLET ORAL
Status: ON HOLD | COMMUNITY
Start: 2021-05-21 | End: 2022-02-25

## 2021-07-20 RX ORDER — HYDROCODONE BITARTRATE AND ACETAMINOPHEN 5; 325 MG/1; MG/1
1 TABLET ORAL EVERY 6 HOURS PRN
Status: ON HOLD | COMMUNITY
End: 2022-02-25

## 2021-07-20 RX ORDER — ESCITALOPRAM OXALATE 10 MG/1
10 TABLET ORAL DAILY
COMMUNITY
Start: 2021-06-04

## 2021-07-20 RX ORDER — ESOMEPRAZOLE MAGNESIUM 40 MG/1
40 CAPSULE, DELAYED RELEASE ORAL
COMMUNITY

## 2021-07-20 ASSESSMENT — SLEEP AND FATIGUE QUESTIONNAIRES
HOW LIKELY ARE YOU TO NOD OFF OR FALL ASLEEP IN A CAR, WHILE STOPPED FOR A FEW MINUTES IN TRAFFIC: 0
HOW LIKELY ARE YOU TO NOD OFF OR FALL ASLEEP WHILE SITTING AND TALKING TO SOMEONE: 0
HOW LIKELY ARE YOU TO NOD OFF OR FALL ASLEEP WHILE SITTING AND READING: 0
ESS TOTAL SCORE: 0
HOW LIKELY ARE YOU TO NOD OFF OR FALL ASLEEP WHILE WATCHING TV: 0
HOW LIKELY ARE YOU TO NOD OFF OR FALL ASLEEP WHEN YOU ARE A PASSENGER IN A CAR FOR AN HOUR WITHOUT A BREAK: 0
HOW LIKELY ARE YOU TO NOD OFF OR FALL ASLEEP WHILE SITTING INACTIVE IN A PUBLIC PLACE: 0
HOW LIKELY ARE YOU TO NOD OFF OR FALL ASLEEP WHILE SITTING QUIETLY AFTER LUNCH WITHOUT ALCOHOL: 0
HOW LIKELY ARE YOU TO NOD OFF OR FALL ASLEEP WHILE LYING DOWN TO REST IN THE AFTERNOON WHEN CIRCUMSTANCES PERMIT: 0

## 2021-07-20 NOTE — PROGRESS NOTES
PULMONARY OUTPATIENT PROGRESS NOTE      Patient:  Jamie Summers  YOB: 1955    MRN: [id number]     Acct:        Pt seen and Chart reviewed. Mr/Ms Jaime Summers is here in followup for    Diagnosis Orders   1. MARSHAL on CPAP     2. Cough variant asthma     3. Allergic rhinitis, unspecified seasonality, unspecified trigger     4. History of morbid obesity       She is here for follow-up of history of MARSHAL/ history of chronic cough/cough variant asthma. Since she was seen last time 5-6 months ago she had back surgery done on May 18, she gradually recovered from surgery and went for physical therapy otherwise did well from respiratory standpoint post surgery. She was prescribed new CPAP machine when she was seen last time and she has been doing well she is very compliant with CPAP. She denies any problem with CPAP. When she wake up in the morning she feels sleep is refreshing and restorative. She feels energetic. She does not doze off during the daytime and she does not take nap during the daytime. She usually goes to sleep from 11 PM and wake up 7 AM but states she has surgery done she is waking up tired in the morning. She wake up once advised to go to bathroom. Last compliance data is available from 12/20/2020 to 03/19/2021 compliance was 94%. Average usage is 9 hours 47 minutes. Auto CPAP of pressure minimum 6 and pressure maximum 10 and average AHI is 3.5. She had bariatric surgery done Anil-en-Y bypass in January 2020. According to patient she had lost 150 pounds total and she had been feeling well since she had lost weight. She has not had any exacerbation and her shortness of breath improved she had been much more active since she started losing weight.   She does not complain of cough she is able to do regular activities and she had was walking 5 miles a day until she had the back surgery done and she is planning to go back to her routine. She had not been using any inhalers she used to be on Serevent and Singulair in the past and had not use it since she had weight loss surgery. She did not require any rescue inhalers and had not use rescue inhalers for almost 2 years or more. According to patient symptoms are controlled on PPI and she is followed by GI.       History of a smoking 2 PPD for 13 years from [address] 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 and was on Xopenex in the past and for last 1 to 1.5  year or more she had no problem with cough, sob or wheezing and had not required any bronchodilators and was doing very well until episode 3-4 months ago        Subjective:   Review of Systems -   General ROS: negative for fever, night sweats or wt loss  ENT ROS: Negative for -  nasal congestion and postnasal drip  Allergy and Immunology ROS: positive for - seasonal allergies  Respiratory ROS: Negative for dry cough, No shortness of breath on walking , no Wheezing, no orthopnea and no PND  Cardiovascular ROS: no chest pain or dyspnea on exertion  Gastrointestinal ROS: no abdominal pain, change in bowel habits, or black or bloody stools, negative for reflux and heartburn  Musculoskeletal ROS: negative for - numbness tingling of extremeties, joints pain and joint swelling   Hem/Onc: negative for bleeding and bruising  Skin: negative for rash and skin lesion   : negative for hematuria, dysuria, frequency and nocturia  CNS: negative for dizziness, weakness, diplopia, tingling numbness headache seizure      Sleep Medicine 7/14/2020 8/31/2016   Sitting and reading 0 0   Watching TV 0 0   Sitting, inactive in a public place (e.g. a theatre or a meeting) 0 0   As a passenger in a car for an hour without a break 0 0   Lying down to rest in the afternoon when circumstances permit 0 0   Sitting and talking to someone 0 0   Sitting quietly after a lunch without alcohol 0 0   In a car, while stopped for a few minutes in traffic 0 0   Total score 0 0       Allergies: Allergies   Allergen Reactions    Biaxin [Clarithromycin] Hives    Ceclor [Cefaclor] Hives    Duricef [Cefadroxil] Hives    Medrol [Methylprednisolone] Other (See Comments)     Swelling of tongue.  Penicillins Hives    Percocet [Oxycodone-Acetaminophen] Shortness Of Breath     Only the     Prednisone Swelling     IV and oral. Makes tongue swell.  Albuterol Other (See Comments)     Burning in chest  Burning in chest    Hydrocodone-Acetaminophen Hives    Isoniazid     Nickel      Other reaction(s): Allergy: RASH;    Norco [Hydrocodone-Acetaminophen] Hives    Other      Other reaction(s): Unknown    Paregoric Hives    Fentanyl Nausea And Vomiting    Morphine Nausea And Vomiting       Medications:  Outpatient Encounter Medications as of 7/20/2021   Medication Sig Dispense Refill    HYDROcodone-acetaminophen (NORCO) 5-325 MG per tablet Take 1 tablet by mouth every 6 hours as needed for Pain.  WP THYROID PO Take by mouth      famotidine (PEPCID) 40 MG tablet 2 times daily       Cyanocobalamin (VITAMIN B-12) 50 MCG LOZG as directed      Prenatal Vit-Fe Fumarate-FA (PRENATAL VITAMIN PO) Take 1 tablet by mouth daily      Cholecalciferol (VITAMIN D3) 5000 units TABS Take 5,000 Units by mouth daily       bumetanide (BUMEX) 1 MG tablet Take 1 mg by mouth three times a week Every Monday Wednesday and friday       gabapentin (NEURONTIN) 100 MG capsule Take 100 mg by mouth 3 times daily.  Flora Gorman esomeprazole (NEXIUM) 24.65 MG CPDR capsule Take 1 capsule by mouth daily       spironolactone (ALDACTONE) 50 MG tablet Take 50 mg by mouth daily       levothyroxine (SYNTHROID) 25 MCG tablet Take 50 mcg by mouth Daily Take 50mcg Daily      LORazepam (ATIVAN) 0.5 MG tablet Take 0.5 mg by mouth 2 times daily as needed. Marj Castorena aspirin 81 MG EC tablet Take 1 tablet by mouth daily. 30 tablet 1    calcium carbonate 1500 (600 Ca) MG TABS tablet calcium carbonate 600 mg calcium (1,500 mg) tablet      diazePAM (VALIUM) 5 MG tablet       escitalopram (LEXAPRO) 10 MG tablet       esomeprazole (NEXIUM) 40 MG delayed release capsule Take 40 mg by mouth every morning (before breakfast)       No facility-administered encounter medications on file as of 7/20/2021. Objective:    Physical Exam:  Vitals:   /61 (Site: Right Upper Arm, Position: Sitting, Cuff Size: Large Adult)   Pulse 64   Temp 97.2 °F (36.2 °C) (Temporal)   Resp 18   Ht 5' 5\" (1.651 m)   Wt 199 lb 12.8 oz (90.6 kg)   SpO2 98% Comment: sitting in room air  BMI 33.25 kg/m²   Last 3 weights: Wt Readings from Last 3 Encounters:   07/20/21 199 lb 12.8 oz (90.6 kg)   05/12/21 197 lb (89.4 kg)   05/10/21 195 lb (88.5 kg)     Body mass index is 33.25 kg/m². Physical Examination:   General appearance - alert, well appearing, and in no distress  Mental status - alert, oriented to person, place, and time  Eyes - pupils equal and reactive, extraocular eye movements intact  Ears - bilateral  external ear canals normal  Nose - normal and patent, no erythema, discharge or polyps, deviated nasal septum to Left  Mouth - mucous membranes moist, pharynx normal without lesions, small oropharynx, Mallampati 1  Neck - supple, no significant adenopathy, short and thick neck  Chest - clear to auscultation, no wheezes, rales or rhonchi, symmetric air entry, no increase work of breathing.   Heart - normal rate, regular rhythm, normal S1, S2, no murmurs, rubs, clicks or gallops  Abdomen - soft, nontender, nondistended, no masses or organomegaly  Neurological - alert, oriented, normal speech, no focal findings or movement disorder noted  Extremities - peripheral pulses normal, + chronic pedal edema, no clubbing or cyanosis. Chronic varicose vein  Skin - normal coloration and turgor, no rashes, no suspicious skin lesions noted           Labs:  None    CBC:   WBC   Date Value Ref Range Status   02/26/2021 5.4 3.5 - 11.3 k/uL Final     Hemoglobin   Date Value Ref Range Status   02/26/2021 13.3 11.9 - 15.1 g/dL Final     Platelet Count   Date Value Ref Range Status   04/23/2012 176 130 - 400 k/uL Final     Platelets   Date Value Ref Range Status   02/26/2021 155 138 - 453 k/uL Final     BMP:   Sodium   Date Value Ref Range Status   06/14/2021 141 135 - 144 mmol/L Final     Potassium   Date Value Ref Range Status   06/14/2021 4.3 3.7 - 5.3 mmol/L Final     Chloride   Date Value Ref Range Status   06/14/2021 103 98 - 107 mmol/L Final     CO2   Date Value Ref Range Status   06/14/2021 25 20 - 31 mmol/L Final     BUN   Date Value Ref Range Status   06/14/2021 20 8 - 23 mg/dL Final     CREATININE   Date Value Ref Range Status   06/14/2021 0.89 0.50 - 0.90 mg/dL Final   03/03/2021 0.87 0.50 - 0.90 mg/dL Final   02/26/2021 0.82 0.50 - 0.90 mg/dL Final     Glucose   Date Value Ref Range Status   06/14/2021 83 70 - 99 mg/dL Final   04/23/2012 145 (H) 74 - 106 mg/dL Final     S. Calcium:   Calcium   Date Value Ref Range Status   06/14/2021 9.0 8.6 - 10.4 mg/dL Final     S. Ionized Calcium: No results found for: IONCA  S. Magnesium:   Magnesium   Date Value Ref Range Status   09/19/2018 1.8 1.6 - 2.6 mg/dL Final     S.  Phosphorus: No results found for: PHOS  S. Glucose:   POC Glucose   Date Value Ref Range Status   12/10/2018 175 (H) 65 - 105 mg/dL Final   08/23/2017 130 (H) 65 - 105 mg/dL Final   08/08/2013 111 (H) 65 - 105 mg/dL Final     Glycosylated hemoglobin A1C:   Hemoglobin A1C   Date Value Ref Range Status   06/14/2021 4.7 4.0 - 6.0 % Final       Pulmonary Functions Testing Results:    3/24/2015: FEV1 2.92  119%, FVC 3.48 105 %, [id number] 113%, TLC 5.15 96.7%, DLCO 20.78 106%    Blood Gases:   pH   Date Value Ref Range Status   08/06/2013 7.36  Final     PCO2   Date Value Ref Range Status   08/06/2013 42 mm Hg Final     PO2   Date Value Ref Range Status   08/06/2013 99 mm Hg Final     HCO3   Date Value Ref Range Status   08/06/2013 24.0 23 - 31 mmol/L Final     O2 Sat   Date Value Ref Range Status   08/06/2013 97 % Final       Radiological reports:    CXR   2/28/17  Lung parenchyma is clear without focal   airspace consolidation, sizeable pleural effusion, or pneumothorax       2/9/2016  Sternotomy wires are present. The heart appears normal size. Pulmonary vasculature is unremarkable. The lungs are clear. No free air. No displaced rib fractures are seen. CT A Scans chest 2/28/17  FINDINGS:     No evidence of pulmonary emboli.  No hilar or mediastinal masses are seen.  Thoracic aorta without acute abnormality.  No evidence of aneurysm or dissection. Lungs are free of consolidation.  No pneumothorax or pleural effusion.  No mass lesions are present. Minimal atelectasis at the left lung base. No acute findings in the upper abdomen. Diffuse fatty infiltration of the liver. Impression:     No significant, acute cardiopulmonary abnormalities.  No evidence of pulmonary embolus. Sleep Study 4/6/2015  Mild to Moderate MARSHAL with AHI of 14.4 and RDI of 15.1 and CPAP titration to cpap of 10 cm     Compliance data  12/15/2020 to 03/14/2021. Compliance 94%  Average usage 9 hours and 45 minutes  Average AHI 3.5  Auto CPAP at 6/10 cm    Assessment:    ICD-10-CM    1. MARSHAL on CPAP  G47.33     Z99.89    2. Cough variant asthma  J45.991    3. Allergic rhinitis, unspecified seasonality, unspecified trigger  J30.9        Plan:    Patient is very compliant with CPAP. Compliance data is available. She is getting benefit from use of CPAP and is clinically and symptomatically much better.   Continue Auto cpap with P min of 6 and P max of 10 cm  Patient to call for Rx for cpap supplies if needed. CPAP at least 4 hrs qhs  Wt loss is recommended and discussed  Follow good sleep hygeine instructions  Use humidifier  Compliance data seen and  pt is compliant per insurance requirements     On nexium daily per GI and follow up GI at [facility]  Avoid allergens and exposure  Continue to observe off bronchodilators. She had Covid vaccine both the doses  Recommend annual flu vaccine in fall. Uptodate on Pneumonia vaccine by PCP and had 5 years ago per patient    Questions answered pertaining to diagnosis and management explained importance of compliance with therapy        RTC1 year    Please note that this chart was generated using voice recognition Dragon dictation software. Although every effort was made to ensure the accuracy of this automated transcription, some errors in transcription may have occurred.     Chris Fox MD, MD             7/20/2021, 9:41 AM

## 2021-11-01 ENCOUNTER — HOSPITAL ENCOUNTER (EMERGENCY)
Age: 66
Discharge: HOME | End: 2021-11-01
Attending: EMERGENCY MEDICINE
Payer: MEDICARE

## 2021-11-01 VITALS
DIASTOLIC BLOOD PRESSURE: 77 MMHG | HEIGHT: 65 IN | TEMPERATURE: 97.9 F | SYSTOLIC BLOOD PRESSURE: 118 MMHG | HEART RATE: 69 BPM | BODY MASS INDEX: 33.66 KG/M2 | WEIGHT: 202 LBS | OXYGEN SATURATION: 98 % | RESPIRATION RATE: 16 BRPM

## 2021-11-01 DIAGNOSIS — S81.811A LACERATION OF RIGHT LOWER EXTREMITY, INITIAL ENCOUNTER: Primary | ICD-10-CM

## 2021-11-01 PROCEDURE — 2500000003 HC RX 250 WO HCPCS: Performed by: NURSE PRACTITIONER

## 2021-11-01 PROCEDURE — 6360000002 HC RX W HCPCS: Performed by: NURSE PRACTITIONER

## 2021-11-01 PROCEDURE — 99282 EMERGENCY DEPT VISIT SF MDM: CPT

## 2021-11-01 PROCEDURE — 12001 RPR S/N/AX/GEN/TRNK 2.5CM/<: CPT

## 2021-11-01 PROCEDURE — 90715 TDAP VACCINE 7 YRS/> IM: CPT | Performed by: NURSE PRACTITIONER

## 2021-11-01 PROCEDURE — 90471 IMMUNIZATION ADMIN: CPT | Performed by: NURSE PRACTITIONER

## 2021-11-01 RX ORDER — LIDOCAINE HYDROCHLORIDE 10 MG/ML
20 INJECTION, SOLUTION INFILTRATION; PERINEURAL ONCE
Status: COMPLETED | OUTPATIENT
Start: 2021-11-01 | End: 2021-11-01

## 2021-11-01 RX ADMIN — LIDOCAINE HYDROCHLORIDE 20 ML: 10 INJECTION, SOLUTION INFILTRATION; PERINEURAL at 23:22

## 2021-11-01 RX ADMIN — TETANUS TOXOID, REDUCED DIPHTHERIA TOXOID AND ACELLULAR PERTUSSIS VACCINE, ADSORBED 0.5 ML: 5; 2.5; 8; 8; 2.5 SUSPENSION INTRAMUSCULAR at 23:51

## 2021-11-01 ASSESSMENT — ENCOUNTER SYMPTOMS
NAUSEA: 0
WHEEZING: 0
SORE THROAT: 0
RHINORRHEA: 0
DIARRHEA: 0
CONSTIPATION: 0
SINUS PRESSURE: 0
VOMITING: 0
COUGH: 0
ABDOMINAL PAIN: 0
SHORTNESS OF BREATH: 0
COLOR CHANGE: 0

## 2021-11-01 ASSESSMENT — PAIN SCALES - GENERAL: PAINLEVEL_OUTOF10: 0

## 2021-11-02 NOTE — ED PROVIDER NOTES
eMERGENCY dEPARTMENT eNCOUnter   [address] [place] Name: Gil Anne  MRN: [id number]  [place] 1955  Date of evaluation: 11/1/21     Gil Anne is a 73 y.o. female with CC: Laceration (rt leg cut by rosalia pot, last tetanus unknown, bleding controlled at this time, pressure dressing)      Based on the medical record the care appears appropriate. I was personally available for consultation in the Emergency Department. The care is provided during an unprecedented national emergency due to the novel coronavirus, COVID 19.     Margaret Horta MD  Attending Emergency Physician                    Margaret Horta MD  11/01/21 [id number]

## 2021-11-02 NOTE — ED PROVIDER NOTES
[address] [place] [address] ED  eMERGENCY dEPARTMENT eNCOUnter      Pt Name: Irineo Dacosta  MRN: [id number]  [place] 1955  Date of evaluation: 11/1/2021  Provider: Joann Ro NP, LIZETTE - [address]       Chief Complaint   Patient presents with    Laceration     rt leg cut by rosalia pot, last tetanus unknown, bleding controlled at this time, pressure dressing         HISTORY OF PRESENT ILLNESS  (Location/Symptom, Timing/Onset, Context/Setting, Quality, Duration, Modifying Factors, Severity.)   Irineo Dacosta is a 73 y.o. female who presents to the emergency department by private vehicle for evaluation of laceration to the right lower leg. The patient reports that she was clearing a rosalia pot. It fell and a piece of the rosalia came back and struck her in the right leg causing a laceration. She is unsure of her last tetanus vaccination      Nursing Notes were reviewed. ALLERGIES     Biaxin [clarithromycin], Ceclor [cefaclor], Duricef [cefadroxil], Medrol [methylprednisolone], Penicillins, Percocet [oxycodone-acetaminophen], Prednisone, Albuterol, Hydrocodone-acetaminophen, Isoniazid, Nickel, Norco [hydrocodone-acetaminophen], Other, Paregoric, Fentanyl, and Morphine    CURRENT MEDICATIONS       Previous Medications    ASPIRIN 81 MG EC TABLET    Take 1 tablet by mouth daily.     BUMETANIDE (BUMEX) 1 MG TABLET    Take 1 mg by mouth three times a week Every Monday Wednesday and friday     CALCIUM CARBONATE 1500 (600 CA) MG TABS TABLET    calcium carbonate 600 mg calcium (1,500 mg) tablet    CHOLECALCIFEROL (VITAMIN D3) 5000 UNITS TABS    Take 5,000 Units by mouth daily     CYANOCOBALAMIN (VITAMIN B-12) 50 MCG LOZG    as directed    DIAZEPAM (VALIUM) 5 MG TABLET        ESCITALOPRAM (LEXAPRO) 10 MG TABLET        ESOMEPRAZOLE (NEXIUM) 24.65 MG CPDR CAPSULE    Take 1 capsule by mouth daily     ESOMEPRAZOLE (NEXIUM) 40 MG DELAYED RELEASE CAPSULE    Take 40 mg by mouth every morning (before breakfast) FAMOTIDINE (PEPCID) 40 MG TABLET    2 times daily     GABAPENTIN (NEURONTIN) 100 MG CAPSULE    Take 100 mg by mouth 3 times daily. Jennifer Rast HYDROCODONE-ACETAMINOPHEN (NORCO) 5-325 MG PER TABLET    Take 1 tablet by mouth every 6 hours as needed for Pain. LEVOTHYROXINE (SYNTHROID) 25 MCG TABLET    Take 50 mcg by mouth Daily Take 50mcg Daily    LORAZEPAM (ATIVAN) 0.5 MG TABLET    Take 0.5 mg by mouth 2 times daily as needed. Jennifer Rast     PRENATAL VIT-FE FUMARATE-FA (PRENATAL VITAMIN PO)    Take 1 tablet by mouth daily    SPIRONOLACTONE (ALDACTONE) 50 MG TABLET    Take 50 mg by mouth daily     WP THYROID PO    Take by mouth       PAST MEDICAL HISTORY         Diagnosis Date    Allergic rhinitis     Arthritis     Asthma     Bunion of left foot     CAD (coronary artery disease) 8/6/13    cabg x 2    Chlorine inhalation lung injury ([address]     Severe irritability and airway reactivity     Chronic back pain     Cough variant asthma     Dyslipidemia     Dyspnea on exertion     Eczema     GERD (gastroesophageal reflux disease)     Headache     MIGRAINES    MVP (mitral valve prolapse)     Obesity     Morbid obesity with BMI of 50.0-59.9, adult    MARSHAL on CPAP     Poison ivy     States in lungs    Sleep apnea     COMPLIANT ON CPAP     Thoracic outlet syndrome     bilateral    Thyroid disease        SURGICAL HISTORY           Procedure Laterality Date    BACK SURGERY      lumbar fusion    CARDIAC CATHETERIZATION      CARPAL TUNNEL RELEASE Right     CATARACT REMOVAL WITH IMPLANT Right 08/07/2018    [facility]    CATARACT REMOVAL WITH IMPLANT Left 08/28/2018    [facility]    CHOLECYSTECTOMY      CORONARY ARTERY BYPASS GRAFT  08/06/2013    cabg x 2     CYSTOSCOPY  04/10/2018    FIRST RIB REMOVAL      bilateral    GASTRIC BYPASS SURGERY  2020    [place] INJECTION PROCEDURE FOR SACROILIAC JOINT Bilateral 11/22/2019    SACROILIAC JOINT INJECTION BILATERAL performed by Krzysztof Katz MD at [facility] SOCIAL HISTORY      reports that she quit smoking about 40 years ago. Her smoking use included cigarettes. She started smoking about 52 years ago. She has a 24.00 pack-year smoking history. She has never used smokeless tobacco. She reports that she does not drink alcohol and does not use drugs. REVIEW OF SYSTEMS    (2-9 systems for level 4, 10 or more for level 5)     Review of Systems   Constitutional: Negative for chills, fever and unexpected weight change. HENT: Negative for congestion, rhinorrhea, sinus pressure and sore throat. Respiratory: Negative for cough, shortness of breath and wheezing. Cardiovascular: Negative for chest pain and palpitations. Gastrointestinal: Negative for abdominal pain, constipation, diarrhea, nausea and vomiting. Genitourinary: Negative for dysuria and hematuria. Musculoskeletal: Negative for arthralgias and myalgias. Skin: Positive for wound. Negative for color change and rash. Neurological: Negative for dizziness, weakness and headaches. Hematological: Negative for adenopathy. All other systems reviewed and are negative. Except as noted above the remainder of the review of systems was reviewed and negative. PHYSICAL EXAM    (up to 7 for level 4, 8 or more for level 5)     ED Triage Vitals [11/01/21 2249]   BP Temp Temp Source Pulse Resp SpO2 Height Weight   118/77 97.9 °F (36.6 °C) Oral 69 16 98 % 5' 5\" (1.651 m) 202 lb (91.6 kg)       Physical Exam  Vitals reviewed. Constitutional:       Appearance: She is well-developed. HENT:      Head: Normocephalic and atraumatic. Eyes:      Conjunctiva/sclera: Conjunctivae normal.      Pupils: Pupils are equal, round, and reactive to light. Cardiovascular:      Rate and Rhythm: Normal rate and regular rhythm. Pulmonary:      Effort: Pulmonary effort is normal. No respiratory distress. Breath sounds: Normal breath sounds. No stridor.    Abdominal:      General: Bowel sounds are normal. Palpations: Abdomen is soft. Musculoskeletal:         General: Normal range of motion. Cervical back: Normal range of motion and neck supple. Lymphadenopathy:      Cervical: No cervical adenopathy. Skin:     General: Skin is warm and dry. Findings: No rash. Neurological:      Mental Status: She is alert and oriented to person, place, and time. LABS:  Labs Reviewed - No data to display    All other labs were within normal range or not returned as of this dictation. EMERGENCY DEPARTMENT COURSE and DIFFERENTIAL DIAGNOSIS/MDM:   Vitals:    Vitals:    11/01/21 2249   BP: 118/77   Pulse: 69   Resp: 16   Temp: 97.9 °F (36.6 °C)   TempSrc: Oral   SpO2: 98%   Weight: 202 lb (91.6 kg)   Height: 5' 5\" (1.651 m)       Medical Decision Making: Sutures out in 7 to 10 days  PROCEDURES:  Laceration Repair Procedure Note    Indication: Laceration    Procedure: The patient was placed in the appropriate position and anesthesia around the laceration was obtained by infiltration using 1% Lidocaine without epinephrine. The area was then cleansed with betadine and draped in a sterile fashion. The laceration was closed with 5-0 Ethilon using interrupted sutures. There were no additional lacerations requiring repair. The wound area was then dressed with a sterile dressing. Total repaired wound length: 1.4 cm. Other Items: Suture count: 4    The patient tolerated the procedure well. Complications: None        FINAL IMPRESSION      1.  Laceration of right lower extremity, initial encounter          DISPOSITION/PLAN   DISPOSITION Decision To Discharge 11/01/2021 11:31:09 PM      PATIENT REFERRED TO:   same day PCP  [phone]          DISCHARGE MEDICATIONS:     New Prescriptions    No medications on file           (Please note that portions of this note were completed with a voice recognition program.  Efforts were made to edit the dictations but occasionally words are mis-transcribed.)    VIKTOR Bañuelos NP, APRN - CNP  Certified Nurse Practitioner        Yudith Pascal, APRN - CNP  11/01/21 [id number]

## 2021-11-20 ENCOUNTER — HOSPITAL ENCOUNTER (OUTPATIENT)
Age: 66
Setting detail: SPECIMEN
Discharge: HOME | End: 2021-11-20
Payer: MEDICARE

## 2021-11-20 LAB
ALBUMIN SERPL-MCNC: 4.5 G/DL (ref 3.5–5.2)
ALBUMIN/GLOBULIN RATIO: 1.7 (ref 1–2.5)
ALP BLD-CCNC: 64 U/L (ref 35–104)
ALT SERPL-CCNC: 37 U/L (ref 5–33)
ANION GAP SERPL CALCULATED.3IONS-SCNC: 11 MMOL/L (ref 9–17)
AST SERPL-CCNC: 31 U/L
BILIRUB SERPL-MCNC: 0.52 MG/DL (ref 0.3–1.2)
BUN BLDV-MCNC: 23 MG/DL (ref 8–23)
BUN/CREAT BLD: ABNORMAL (ref 9–20)
CALCIUM SERPL-MCNC: 9.7 MG/DL (ref 8.6–10.4)
CALCIUM SERPL-MCNC: 9.8 MG/DL (ref 8.6–10.4)
CHLORIDE BLD-SCNC: 105 MMOL/L (ref 98–107)
CHOLESTEROL, FASTING: 166 MG/DL
CHOLESTEROL/HDL RATIO: 3.1
CO2: 25 MMOL/L (ref 20–31)
CREAT SERPL-MCNC: 0.76 MG/DL (ref 0.5–0.9)
GFR AFRICAN AMERICAN: >60 ML/MIN
GFR NON-AFRICAN AMERICAN: >60 ML/MIN
GFR SERPL CREATININE-BSD FRML MDRD: ABNORMAL ML/MIN/{1.73_M2}
GFR SERPL CREATININE-BSD FRML MDRD: ABNORMAL ML/MIN/{1.73_M2}
GLUCOSE FASTING: 91 MG/DL (ref 70–99)
HDLC SERPL-MCNC: 53 MG/DL
LDL CHOLESTEROL: 83 MG/DL (ref 0–130)
POTASSIUM SERPL-SCNC: 4.4 MMOL/L (ref 3.7–5.3)
SODIUM BLD-SCNC: 141 MMOL/L (ref 135–144)
T3 FREE: 2.81 PG/ML (ref 2.02–4.43)
THYROXINE, FREE: 0.96 NG/DL (ref 0.93–1.7)
TOTAL PROTEIN: 7.2 G/DL (ref 6.4–8.3)
TRIGLYCERIDE, FASTING: 152 MG/DL
TSH SERPL DL<=0.05 MIU/L-ACNC: 1.34 MIU/L (ref 0.3–5)
VITAMIN D 25-HYDROXY: 45.3 NG/ML (ref 30–100)
VLDLC SERPL CALC-MCNC: ABNORMAL MG/DL (ref 1–30)

## 2021-11-20 PROCEDURE — 84481 FREE ASSAY (FT-3): CPT

## 2021-11-20 PROCEDURE — 80053 COMPREHEN METABOLIC PANEL: CPT

## 2021-11-20 PROCEDURE — 83036 HEMOGLOBIN GLYCOSYLATED A1C: CPT

## 2021-11-20 PROCEDURE — 80061 LIPID PANEL: CPT

## 2021-11-20 PROCEDURE — 36415 COLL VENOUS BLD VENIPUNCTURE: CPT

## 2021-11-20 PROCEDURE — 82306 VITAMIN D 25 HYDROXY: CPT

## 2021-11-20 PROCEDURE — 84443 ASSAY THYROID STIM HORMONE: CPT

## 2021-11-20 PROCEDURE — 82310 ASSAY OF CALCIUM: CPT

## 2021-11-20 PROCEDURE — 84439 ASSAY OF FREE THYROXINE: CPT

## 2021-11-21 LAB
ESTIMATED AVERAGE GLUCOSE: 94 MG/DL
HBA1C MFR BLD: 4.9 % (ref 4–6)

## 2022-02-25 ENCOUNTER — HOSPITAL ENCOUNTER (OUTPATIENT)
Age: 67
Setting detail: OUTPATIENT SURGERY
Discharge: HOME | End: 2022-02-25
Attending: UROLOGY | Admitting: UROLOGY
Payer: MEDICARE

## 2022-02-25 VITALS
HEART RATE: 54 BPM | SYSTOLIC BLOOD PRESSURE: 136 MMHG | OXYGEN SATURATION: 99 % | DIASTOLIC BLOOD PRESSURE: 69 MMHG | TEMPERATURE: 97 F | BODY MASS INDEX: 37.32 KG/M2 | WEIGHT: 224 LBS | RESPIRATION RATE: 16 BRPM | HEIGHT: 65 IN

## 2022-02-25 LAB
BILIRUBIN URINE: NEGATIVE
COLOR: YELLOW
GLUCOSE URINE: NEGATIVE
KETONES, URINE: NEGATIVE
LEUKOCYTE ESTERASE, URINE: NEGATIVE
NITRITE, URINE: NEGATIVE
PH UA: 6.5 (ref 5–8)
PROTEIN UA: NEGATIVE
SPECIFIC GRAVITY UA: 1.01 (ref 1–1.03)
TURBIDITY: CLEAR
URINE HGB: NEGATIVE
UROBILINOGEN, URINE: NORMAL

## 2022-02-25 PROCEDURE — 81003 URINALYSIS AUTO W/O SCOPE: CPT

## 2022-02-25 PROCEDURE — 7100000010 HC PHASE II RECOVERY - FIRST 15 MIN: Performed by: UROLOGY

## 2022-02-25 PROCEDURE — 6370000000 HC RX 637 (ALT 250 FOR IP): Performed by: UROLOGY

## 2022-02-25 PROCEDURE — 3600000012 HC SURGERY LEVEL 2 ADDTL 15MIN: Performed by: UROLOGY

## 2022-02-25 PROCEDURE — 2709999900 HC NON-CHARGEABLE SUPPLY: Performed by: UROLOGY

## 2022-02-25 PROCEDURE — 3600000002 HC SURGERY LEVEL 2 BASE: Performed by: UROLOGY

## 2022-02-25 RX ORDER — TROSPIUM CHLORIDE 20 MG/1
20 TABLET, FILM COATED ORAL 2 TIMES DAILY
Qty: 60 TABLET | Refills: 1 | Status: SHIPPED | OUTPATIENT
Start: 2022-02-25

## 2022-02-25 RX ORDER — OXYCODONE HYDROCHLORIDE AND ACETAMINOPHEN 5; 325 MG/1; MG/1
1 TABLET ORAL PRN
COMMUNITY
Start: 2022-02-23

## 2022-02-25 RX ORDER — CIPROFLOXACIN 500 MG/1
500 TABLET, FILM COATED ORAL 2 TIMES DAILY
Qty: 10 TABLET | Refills: 0 | Status: SHIPPED | OUTPATIENT
Start: 2022-02-25 | End: 2022-03-02

## 2022-02-25 RX ORDER — LIDOCAINE HYDROCHLORIDE 20 MG/ML
JELLY TOPICAL PRN
Status: DISCONTINUED | OUTPATIENT
Start: 2022-02-25 | End: 2022-02-25 | Stop reason: ALTCHOICE

## 2022-02-25 ASSESSMENT — PAIN SCALES - GENERAL: PAINLEVEL_OUTOF10: 0

## 2022-02-25 ASSESSMENT — PAIN - FUNCTIONAL ASSESSMENT: PAIN_FUNCTIONAL_ASSESSMENT: 0-10

## 2022-02-25 NOTE — H&P
History and Physical Update    Pt Name: Tyler Clark  MRN: [id number]  YOB: 1955  Date of evaluation: 2/25/2022      [x] I have reviewed the office note found in the pt's paper chart by Dr. Rowena Casillas dated 2/17/22 used for an Interval History and Physical note. [x] I have examined Tyler Clark a 78 y.o., female who is scheduled for a cystoscopy URO LPP by Dr. Rowena Casillas due to urge incontinence. The patient denies health changes since her appointment with Dr. Rowena Casillas on 2/17/22. Pt denies fever, chills, productive cough, SOB, chest pain, open sores, rashes, wounds, and history of diabetes. Motrin was last taken on 2/20/22. Aspirin, vitamin D, and Prenatal vitamin were last taken on 2/24/22. History of CAD, MVP, dyslipidemia, asthma, MARSHAL, thyroid disease, and bilateral thoracic outlet syndrome. S/p cardiac catheterization and CABG in 2013. Pt states she does not have cardiac stents.      Vital signs: /68   Pulse 57   Temp 97.1 °F (36.2 °C) (Temporal)   Resp 18   Ht 5' 5\" (1.651 m)   Wt 224 lb (101.6 kg)   SpO2 98%   BMI 37.28 kg/m²      Allergies:  Biaxin [clarithromycin], Ceclor [cefaclor], Duricef [cefadroxil], Medrol [methylprednisolone], Penicillins, Percocet [oxycodone-acetaminophen], Prednisone, Albuterol, Hydrocodone-acetaminophen, Isoniazid, Nickel, Norco [hydrocodone-acetaminophen], Other, Paregoric, Fentanyl, and Morphine      Past Medical History:     Past Medical History:   Diagnosis Date    Allergic rhinitis     Arthritis     Asthma     Bunion of left foot     CAD (coronary artery disease) 8/6/13    cabg x 2    Chlorine inhalation lung injury (HCC)     Severe irritability and airway reactivity     Chronic back pain     Cough variant asthma     Dyslipidemia     Dyspnea on exertion     Eczema     GERD (gastroesophageal reflux disease)     Headache     MIGRAINES    MVP (mitral valve prolapse)     Obesity     Morbid obesity with BMI of 50.0-59.9, adult    MARSHAL on CPAP     Poison ivy     States in lungs    Sleep apnea     COMPLIANT ON CPAP     Thoracic outlet syndrome     bilateral    Thyroid disease         Past Surgical History:     Past Surgical History:   Procedure Laterality Date    BACK SURGERY      lumbar fusion    CARDIAC CATHETERIZATION      CARPAL TUNNEL RELEASE Right     CATARACT REMOVAL WITH IMPLANT Right 08/07/2018    [facility]    CATARACT REMOVAL WITH IMPLANT Left 08/28/2018    [facility]    CHOLECYSTECTOMY      CORONARY ARTERY BYPASS GRAFT  08/06/2013    cabg x 2     CYSTOSCOPY  04/10/2018    FIRST RIB REMOVAL      bilateral    GASTRIC BYPASS SURGERY  2020    [place] INJECTION PROCEDURE FOR SACROILIAC JOINT Bilateral 11/22/2019    SACROILIAC JOINT INJECTION BILATERAL performed by Neno Chi MD at [facility] Right     NERVE BLOCK Bilateral 09/14/2020    BILATERAL L4 TRANSFORAMIAL EPIDURAL STEROID INJECTION (Bilateral     NERVE BLOCK Left 02/22/2021    NERVE BLOCK LEFT MEDIAL BRANCH T 5 6 AND 7 performed by Neno Chi MD at [facility] Left 03/09/2021    NERVE BLOCK  LEFT MEDIAL BRANCH T 5 6 AND 7 performed by Neno Chi MD at [place] [address] Bilateral 07/23/2020    BILATERAL L4 TFE performed by Neno Chi MD at [place] [address] Bilateral 09/14/2020    BILATERAL L4 TRANSFORAMIAL EPIDURAL STEROID INJECTION performed by Neno Chi MD at [place] [address] Left 5/10/2021    LEFT  T 5 6 7 NERVE RADIOFREQUENCY ABLATION performed by Neno Chi MD at [address] [place] [address] 04/10/2018    CYSTOSCOPY performed by Juanita Matias MD at [address] [place] [address] W/O ECP Right 08/07/2018    EYE CATARACT EMULSIFICATION IOL IMPLANT performed by Jose A Caldera MD at [address] IO LENS PROSTH W/O ECP Left 08/28/2018    EYE CATARACT EMULSIFICATION IOL IMPLANT performed by Henok Joseph MD at [address] Left     TONSILLECTOMY      TOTAL KNEE ARTHROPLASTY Left     TOTAL KNEE ARTHROPLASTY Right 03/2018    ULNAR TUNNEL RELEASE Right     VARICOSE VEIN SURGERY      bilateral        Social History:     Tobacco:    reports that she quit smoking about 40 years ago. Her smoking use included cigarettes. She started smoking about 53 years ago. She has a 24.00 pack-year smoking history. She has never used smokeless tobacco.  Alcohol:      reports no history of alcohol use. Drug Use:  reports no history of drug use. Family History:     Family History   Problem Relation Age of Onset    Heart Disease Father     Cancer Father         lung    Heart Disease Mother         5 stents placed    Diabetes Mother     Diabetes Sister     Rheum Arthritis Sister        Medications:    Prior to Admission medications    Medication Sig Start Date End Date Taking? Authorizing Provider   HYDROcodone-acetaminophen (NORCO) 5-325 MG per tablet Take 1 tablet by mouth every 6 hours as needed for Pain.     Historical Provider, MD   calcium carbonate 1500 (600 Ca) MG TABS tablet calcium carbonate 600 mg calcium (1,500 mg) tablet    Historical Provider, MD   diazePAM (VALIUM) 5 MG tablet  5/21/21   Historical Provider, MD   escitalopram (LEXAPRO) 10 MG tablet  6/4/21   Historical Provider, MD   esomeprazole (NEXIUM) 40 MG delayed release capsule Take 40 mg by mouth every morning (before breakfast)    Historical Provider, MD   [facility] THYROID PO Take by mouth    Historical Provider, MD   famotidine (PEPCID) 40 MG tablet 2 times daily     Historical Provider, MD   Cyanocobalamin (VITAMIN B-12) 50 MCG LOZG as directed    Historical Provider, MD   Prenatal Vit-Fe Fumarate-FA (PRENATAL VITAMIN PO) Take 1 tablet by mouth daily 1/15/20   Historical Provider, MD   Cholecalciferol (VITAMIN D3) 5000 units TABS Take 5,000 Units by mouth daily     Historical Provider, MD   bumetanide (BUMEX) 1 MG tablet Take 1 mg by mouth three times a week Every Monday Wednesday and friday     Historical Provider, MD   gabapentin (NEURONTIN) 100 MG capsule Take 100 mg by mouth 3 times daily. Heladio Wilson Historical Provider, MD   esomeprazole (NEXIUM) 24.65 MG CPDR capsule Take 1 capsule by mouth daily     Historical Provider, MD   spironolactone (ALDACTONE) 50 MG tablet Take 50 mg by mouth daily     Historical Provider, MD   levothyroxine (SYNTHROID) 25 MCG tablet Take 50 mcg by mouth Daily Take 50mcg Daily    Historical Provider, MD   LORazepam (ATIVAN) 0.5 MG tablet Take 0.5 mg by mouth 2 times daily as needed. Heladio Wilson Historical Provider, MD   aspirin 81 MG EC tablet Take 1 tablet by mouth daily. 8/8/13   Ponce Bustos MD       This is a 78 y.o. female who is pleasant, cooperative, alert and oriented x 3, in no acute distress. Obese. Heart: Asymptomatic bradycardia. HR 57 BPM. Regular rhythm without murmur, gallop, or rub. Lungs: Normal respiratory effort, unlabored, and clear to auscultation without wheezes or rales bilaterally. Abdomen: Soft, nontender, nondistended with active bowel sounds. Pedal pulses: 2+ bilaterally. Labs:  No results for input(s): HGB, HCT, WBC, MCV, PLT, NA, K, CL, CO2, BUN, CREATININE, GLUCOSE, INR, PROTIME, APTT, AST, ALT, LABALBU, HCG in the last 720 hours. No results for input(s): COVID19 in the last 720 hours.       LIZETTE Reinoso CNP   Electronically signed 2/25/2022 at 8:53 AM

## 2022-02-25 NOTE — OP NOTE
Operative Note      Patient: Ananth Torres  YOB: 1955  MRN: [id number]    Date of Procedure: 2/25/2022    Pre-Op Diagnosis: DX URGE INCONTINENCE    Post-Op Diagnosis: Same and with overactive bladder small capacity bladder       Procedure(s):  CYSTOSCOPY URO LPP    Surgeon(s):  Jessica Schmidt MD    Assistant:   * No surgical staff found *    Anesthesia: Local    Estimated Blood Loss (mL): Minimal    Complications: None    Specimens:   ID Type Source Tests Collected by Time Destination   1 : Cysto urine Urine Urine, Cystoscopic URINALYSIS WITH REFLEX TO CULTURE Jessica Schmidt MD 2/25/2022 1022        Implants:  * No implants in log *      Drains: * No LDAs found *    Findings: indications: This patient is 78years old, she presents for evaluation and management regarding symptoms as mentioned above. The patient has had frequency, urgency, urge incontinence, she denies any stress urinary incontinence, the patient was scheduled for urologic evaluation as well as evaluation of the upper urinary tract by ultrasonography    Detailed Description of Procedure:   Patient was brought to the operating room, positioned in dorsal lithotomy, proper patient identification procedure identification prepping and draping in the usual sterile manner. Examination of the introitus revealed evidence of pelvic floor relaxation, no vaginal bleeding or discharge. We gently dilated the urethra with the [employer] sounds through size 22 [place] without Occasions    We entered the bladder with the 21 cystoscope, examination of the bladder revealed no evidence of tumors ulcers or stones, the trigone is normal, ureteral orifices effluxing clear urine anterior and lateral bladder walls all examined and unremarkable. Bladder neck and urethra examined, bladder neck polyps identified, no urethral diverticulum or caruncle.     A bladder pressure catheter was then introduced, electromyography patches and pressure catheters

## 2022-03-01 ENCOUNTER — HOSPITAL ENCOUNTER (OUTPATIENT)
Dept: CT IMAGING | Facility: CLINIC | Age: 67
Discharge: HOME | End: 2022-03-03
Payer: MEDICARE

## 2022-03-01 DIAGNOSIS — N32.81 OAB (OVERACTIVE BLADDER): ICD-10-CM

## 2022-03-01 DIAGNOSIS — R39.89 BLADDER PAIN: ICD-10-CM

## 2022-03-01 DIAGNOSIS — N39.0 URINARY TRACT INFECTION WITHOUT HEMATURIA, SITE UNSPECIFIED: ICD-10-CM

## 2022-03-01 PROCEDURE — 74176 CT ABD & PELVIS W/O CONTRAST: CPT

## 2022-03-18 ENCOUNTER — TELEPHONE (OUTPATIENT)
Dept: PULMONOLOGY | Age: 67
End: 2022-03-18

## 2022-04-04 ENCOUNTER — OFFICE VISIT (OUTPATIENT)
Dept: PODIATRY | Age: 67
End: 2022-04-04
Payer: MEDICARE

## 2022-04-04 VITALS — RESPIRATION RATE: 16 BRPM | BODY MASS INDEX: 36.32 KG/M2 | HEIGHT: 65 IN | WEIGHT: 218 LBS

## 2022-04-04 DIAGNOSIS — S90.425A BLISTER OF FIFTH TOE OF LEFT FOOT, INITIAL ENCOUNTER: Primary | ICD-10-CM

## 2022-04-04 DIAGNOSIS — M79.605 PAIN OF LEFT LOWER EXTREMITY: ICD-10-CM

## 2022-04-04 PROCEDURE — 1036F TOBACCO NON-USER: CPT | Performed by: PODIATRIST

## 2022-04-04 PROCEDURE — 1123F ACP DISCUSS/DSCN MKR DOCD: CPT | Performed by: PODIATRIST

## 2022-04-04 PROCEDURE — G8399 PT W/DXA RESULTS DOCUMENT: HCPCS | Performed by: PODIATRIST

## 2022-04-04 PROCEDURE — 99213 OFFICE O/P EST LOW 20 MIN: CPT | Performed by: PODIATRIST

## 2022-04-04 PROCEDURE — 3017F COLORECTAL CA SCREEN DOC REV: CPT | Performed by: PODIATRIST

## 2022-04-04 PROCEDURE — 4040F PNEUMOC VAC/ADMIN/RCVD: CPT | Performed by: PODIATRIST

## 2022-04-04 PROCEDURE — G8427 DOCREV CUR MEDS BY ELIG CLIN: HCPCS | Performed by: PODIATRIST

## 2022-04-04 PROCEDURE — 1090F PRES/ABSN URINE INCON ASSESS: CPT | Performed by: PODIATRIST

## 2022-04-04 PROCEDURE — G8417 CALC BMI ABV UP PARAM F/U: HCPCS | Performed by: PODIATRIST

## 2022-04-04 RX ORDER — LIOTHYRONINE SODIUM 5 UG/1
TABLET ORAL
COMMUNITY
Start: 2022-03-28

## 2022-04-04 RX ORDER — LEVOTHYROXINE SODIUM 0.05 MG/1
TABLET ORAL
COMMUNITY
Start: 2022-02-01

## 2022-04-04 RX ORDER — NYSTATIN 100000 [USP'U]/G
POWDER TOPICAL
COMMUNITY
Start: 2022-02-28

## 2022-04-04 RX ORDER — MONTELUKAST SODIUM 10 MG/1
TABLET ORAL
COMMUNITY

## 2022-04-04 NOTE — PATIENT INSTRUCTIONS
Schedule a Vaccine  When you qualify to receive the vaccine, call the [facility]) COVID-19 Vaccination Hotline to schedule your appointment or to get additional information about the [facility]) locations which are offering the COVID-19 vaccine. To be 94% effective, it's important that you receive two doses of one of the COVID-19 vaccines. -If you are receiving the [place] vaccine, your second shot will be scheduled as close to 21 days after the first shot as possible. -If you are receiving the Moderna vaccine, your second shot will be scheduled as close to 28 days after the first shot as possible. [facility]) COVID-19 Vaccination Hotline: [phone]    Links to [facility]) website and [facility] website:    [email or web address]    [email or web address]

## 2022-04-04 NOTE — PROGRESS NOTES
[address] [facility] PODIATRY [facility]  [address] [place] [id number]  Dept: [phone]  Dept Fax: [phone]    RETURN PATIENT PROGRESS NOTE  Date of patient's visit: 4/4/2022  Patient's Name:  Gayla Fleming YOB: 1955            Patient Care Team:  LIZETTE Cornejo CNP as PCP - General (Nurse Practitioner)  Gabrielle Mondragon MD as Surgeon (Cardiothoracic Surgery)  Gudelia Wynn MD as Consulting Physician (Pulmonology)  Hillary Obrien DPM as Physician (Podiatry)       Gayla Fleming 78 y.o. female that presents for follow-up of   Chief Complaint   Patient presents with    Toe Pain     left 5th toe injury x1 week     Pt's primary care physician is LIZETTE Cornejo CNP has not been seen in the last 6 months  Symptoms began 1 week(s) ago and are unchanged . Patient relates pain is Present to left 5th toe. Pain is rated 3 out of 10 and is described as constant, moderate. Treatments prior to today's visit include: none. Currently denies F/C/N/V. Allergies   Allergen Reactions    Biaxin [Clarithromycin] Hives    Ceclor [Cefaclor] Hives    Duricef [Cefadroxil] Hives    Medrol [Methylprednisolone] Other (See Comments)     Swelling of tongue.  Penicillins Hives    Percocet [Oxycodone-Acetaminophen] Shortness Of Breath     Only the     Prednisone Swelling     IV and oral. Makes tongue swell.  Albuterol Other (See Comments)     Burning in chest  Burning in chest    Hydrocodone-Acetaminophen Hives    Isoniazid Hives    Nickel      Other reaction(s):  Allergy: RASH;    Norco [Hydrocodone-Acetaminophen] Hives    Other      Other reaction(s): Unknown    Paregoric Hives    Fentanyl Nausea And Vomiting    Morphine Nausea And Vomiting       Past Medical History:   Diagnosis Date    Allergic rhinitis     Arthritis     Asthma     Bunion of left foot     CAD (coronary artery disease) 8/6/13    cabg x 2    Chlorine inhalation lung injury ([facility] [address]     Severe irritability and airway reactivity     Chronic back pain     Cough variant asthma     Dyslipidemia     Dyspnea on exertion     Eczema     GERD (gastroesophageal reflux disease)     Headache     MIGRAINES    MVP (mitral valve prolapse)     Obesity     Morbid obesity with BMI of 50.0-59.9, adult    MARSHAL on CPAP     Poison ivy     States in lungs    Sleep apnea     COMPLIANT ON CPAP     Thoracic outlet syndrome     bilateral    Thyroid disease        Prior to Admission medications    Medication Sig Start Date End Date Taking? Authorizing Provider   brexpiprazole (REXULTI) 0.5 MG TABS tablet Rexulti 0.5 mg tablet   Yes Historical Provider, MD   levothyroxine (SYNTHROID) 50 MCG tablet  2/1/22  Yes Historical Provider, MD   liothyronine (CYTOMEL) 5 MCG tablet  3/28/22  Yes Historical Provider, MD   montelukast (SINGULAIR) 10 MG tablet montelukast 10 mg tablet   Yes Historical Provider, MD   nystatin ([address]) 593513 UNIT/GM powder Nyamyc 100,000 unit/gram topical powder 2/28/22  Yes Historical Provider, MD   oxyCODONE-acetaminophen (PERCOCET) 5-325 MG per tablet Take 1 tablet by mouth as needed.  2/23/22  Yes Historical Provider, MD   trospium (SANCTURA) 20 MG tablet Take 1 tablet by mouth 2 times daily 2/25/22  Yes Renetta Velazquez MD   calcium carbonate 1500 (600 Ca) MG TABS tablet calcium carbonate 600 mg calcium (1,500 mg) tablet   Yes Historical Provider, MD   escitalopram (LEXAPRO) 10 MG tablet Take 10 mg by mouth daily  6/4/21  Yes Historical Provider, MD   esomeprazole (NEXIUM) 40 MG delayed release capsule Take 40 mg by mouth every morning (before breakfast)   Yes Historical Provider, MD   [facility] THYROID PO Take by mouth   Yes Historical Provider, MD   famotidine (PEPCID) 40 MG tablet 2 times daily    Yes Historical Provider, MD   Cyanocobalamin (VITAMIN B-12) 50 MCG LOZG as directed   Yes Historical Provider, MD   Prenatal Vit-Fe Fumarate-FA (PRENATAL VITAMIN PO) Take 1 tablet by mouth daily 1/15/20  Yes Historical Provider, MD   Cholecalciferol (VITAMIN D3) 5000 units TABS Take 5,000 Units by mouth daily    Yes Historical Provider, MD   bumetanide (BUMEX) 1 MG tablet Take 1 mg by mouth three times a week Every Monday Wednesday and friday    Yes Historical Provider, MD   gabapentin (NEURONTIN) 100 MG capsule Take 100 mg by mouth 3 times daily. .   Yes Historical Provider, MD   esomeprazole (NEXIUM) 24.65 MG CPDR capsule Take 1 capsule by mouth daily    Yes Historical Provider, MD   spironolactone (ALDACTONE) 50 MG tablet Take 50 mg by mouth daily    Yes Historical Provider, MD   LORazepam (ATIVAN) 0.5 MG tablet Take 0.5 mg by mouth 2 times daily as needed. .   Yes Historical Provider, MD   aspirin 81 MG EC tablet Take 1 tablet by mouth daily. 8/8/13  Yes Radha Santana MD       Review of Systems    Review of Systems:  History obtained from chart review and the patient  General ROS: negative for - chills, fatigue, fever, night sweats or weight gain  Constitutional: Negative for chills, diaphoresis, fatigue, fever and unexpected weight change. Musculoskeletal: Positive for arthralgias, gait problem and joint swelling. Neurological ROS: negative for - behavioral changes, confusion, headaches or seizures. Negative for weakness and numbness. Dermatological ROS: negative for - mole changes, rash  Cardiovascular: Negative for leg swelling. Gastrointestinal: Negative for constipation, diarrhea, nausea and vomiting. Lower Extremity Physical Examination:     Vitals:   Vitals:    04/04/22 1043   Resp: 16     General: AAO x 3 in NAD. Dermatologic Exam:  Blister noted to left 5th digit distally with serous drainage    Musculoskeletal:     1st MPJ ROM decreased, Bilateral.  Muscle strength 5/5, Bilateral.  Pain present upon palpation of left 5th toe. Medial longitudinal arch, Bilateral WNL.   Ankle ROM WNL,Bilateral.    Dorsally contracted digits absent digits 1-5 Bilateral.     Vascular: DP and PT pulses palpable 2/4, Bilateral.  CFT <3 seconds, Bilateral.  Hair growth present to the level of the digits, Bilateral.  Edema absent, Bilateral.  Varicosities absent, Bilateral. Erythema absent, Bilateral    Neurological: Sensation intact to light touch to level of digits, Bilateral.  Protective sensation intact 10/10 sites via 5.07/10g [employer] Monofilament, Bilateral.  negative Tinel's, Bilateral.  negative Valleix sign, Bilateral.      Integument: Warm, dry, supple, Bilateral.  Open lesion absent, Bilateral.  Interdigital maceration absent to web spaces 1-4, Bilateral.  Nails are normal in length, thickness and color 1-5 bilateral.  Fissures absent, Bilateral.         Asessment: Patient is a 78 y.o. female with:    Diagnosis Orders   1. Blister of fifth toe of left foot, initial encounter     2. Pain of left lower extremity           Plan: Patient examined and evaluated. Current condition and treatment options discussed in detail. Upon using #15 blade to debride blister, there was noted serous drainage. Applied triple abx and DSD. Advised pt to wear silicone toe sleeve to relieve pressure from toe. Discussed importance of wider shoes. All labs were reviewed and all imagining including the above findings were reviewed PRIOR to the patients arrival and with the patient today. Previous patient encounter was reviewed. Encounters from the patients other medical providers were reviewed and noted. Time was spent educating the patient on proper care of the feet and ankles. All the above diagnosis were addressed at todays visit and all questions were answered. A total of 25 minutes was spent with this patients encounter which included charting after the patients visit    . Verbal and written instructions given to patient. Contact office with any questions/problems/concerns. No orders of the defined types were placed in this encounter.     No orders of the defined types were placed in this encounter. RTC in 2month(s).     4/4/2022      Electronically signed by Jacqueline Winkler DPM on 4/4/2022 at 10:47 AM  4/4/2022

## 2022-05-13 ENCOUNTER — TELEPHONE (OUTPATIENT)
Dept: PULMONOLOGY | Age: 67
End: 2022-05-13

## 2022-05-13 DIAGNOSIS — J45.901 MODERATE ASTHMA WITH ACUTE EXACERBATION, UNSPECIFIED WHETHER PERSISTENT: Primary | ICD-10-CM

## 2022-05-13 RX ORDER — LEVALBUTEROL INHALATION SOLUTION 0.63 MG/3ML
1 SOLUTION RESPIRATORY (INHALATION) EVERY 6 HOURS PRN
Qty: 120 EACH | Refills: 0 | Status: SHIPPED | OUTPATIENT
Start: 2022-05-13

## 2022-05-13 NOTE — TELEPHONE ENCOUNTER
Patient has covid and has not needed inhaler up until now . Can you please siign pending order. Thanks!

## 2022-05-16 RX ORDER — LEVALBUTEROL TARTRATE 45 UG/1
1 AEROSOL, METERED ORAL EVERY 4 HOURS PRN
Qty: 1 EACH | Refills: 3 | OUTPATIENT
Start: 2022-05-16 | End: 2023-05-16

## 2022-05-16 RX ORDER — AZELASTINE 1 MG/ML
2 SPRAY, METERED NASAL 2 TIMES DAILY
Qty: 1 EACH | Refills: 5 | Status: SHIPPED | OUTPATIENT
Start: 2022-05-16

## 2022-05-16 NOTE — TELEPHONE ENCOUNTER
She has not been seen in the office since July 2021. I did write a prescription for Astelin nasal spray ASAP. If she does not get relief or she cannot use that either that she needs to call her primary care physician.

## 2022-05-16 NOTE — TELEPHONE ENCOUNTER
Patient wanted an inhaler not nebulizer solution as she doesn't have a nebulizer. Now she has nebulizer solution and no machine. I phoned in the inhaler. Wrote an order for a nebulizer and will fax it to [address]

## 2022-05-24 ENCOUNTER — HOSPITAL ENCOUNTER (OUTPATIENT)
Age: 67
Discharge: HOME | End: 2022-05-24
Payer: MEDICARE

## 2022-05-24 ENCOUNTER — HOSPITAL ENCOUNTER (OUTPATIENT)
Dept: CT IMAGING | Age: 67
Discharge: HOME | End: 2022-05-26
Payer: MEDICARE

## 2022-05-24 DIAGNOSIS — R07.9 CHEST PAIN, UNSPECIFIED TYPE: ICD-10-CM

## 2022-05-24 DIAGNOSIS — U07.1 INFECTION DUE TO 2019-NCOV: ICD-10-CM

## 2022-05-24 DIAGNOSIS — R06.02 SHORTNESS OF BREATH: ICD-10-CM

## 2022-05-24 LAB
BILIRUBIN URINE: NEGATIVE
COLOR: YELLOW
COMMENT UA: NORMAL
CREAT SERPL-MCNC: 0.82 MG/DL (ref 0.5–0.9)
GFR AFRICAN AMERICAN: >60 ML/MIN
GFR NON-AFRICAN AMERICAN: >60 ML/MIN
GFR SERPL CREATININE-BSD FRML MDRD: NORMAL ML/MIN/{1.73_M2}
GLUCOSE URINE: NEGATIVE
KETONES, URINE: NEGATIVE
LEUKOCYTE ESTERASE, URINE: NEGATIVE
NITRITE, URINE: NEGATIVE
PH UA: 5.5 (ref 5–8)
PROTEIN UA: NEGATIVE
SPECIFIC GRAVITY UA: 1.03 (ref 1–1.03)
TURBIDITY: CLEAR
URINE HGB: NEGATIVE
UROBILINOGEN, URINE: NORMAL

## 2022-05-24 PROCEDURE — 81003 URINALYSIS AUTO W/O SCOPE: CPT

## 2022-05-24 PROCEDURE — 82565 ASSAY OF CREATININE: CPT

## 2022-05-24 PROCEDURE — 6360000004 HC RX CONTRAST MEDICATION: Performed by: FAMILY MEDICINE

## 2022-05-24 PROCEDURE — 36415 COLL VENOUS BLD VENIPUNCTURE: CPT

## 2022-05-24 PROCEDURE — 2580000003 HC RX 258: Performed by: FAMILY MEDICINE

## 2022-05-24 PROCEDURE — 71260 CT THORAX DX C+: CPT

## 2022-05-24 RX ORDER — SODIUM CHLORIDE 0.9 % (FLUSH) 0.9 %
10 SYRINGE (ML) INJECTION ONCE
Status: COMPLETED | OUTPATIENT
Start: 2022-05-24 | End: 2022-05-24

## 2022-05-24 RX ORDER — 0.9 % SODIUM CHLORIDE 0.9 %
80 INTRAVENOUS SOLUTION INTRAVENOUS ONCE
Status: COMPLETED | OUTPATIENT
Start: 2022-05-24 | End: 2022-05-24

## 2022-05-24 RX ADMIN — IOPAMIDOL 75 ML: 755 INJECTION, SOLUTION INTRAVENOUS at 14:49

## 2022-05-24 RX ADMIN — SODIUM CHLORIDE, PRESERVATIVE FREE 10 ML: 5 INJECTION INTRAVENOUS at 14:52

## 2022-05-24 RX ADMIN — SODIUM CHLORIDE 80 ML: 9 INJECTION, SOLUTION INTRAVENOUS at 14:52

## 2022-06-08 ENCOUNTER — HOSPITAL ENCOUNTER (OUTPATIENT)
Age: 67
Discharge: HOME | End: 2022-06-08
Payer: MEDICARE

## 2022-06-08 LAB
ABSOLUTE EOS #: 0.29 K/UL (ref 0–0.44)
ABSOLUTE IMMATURE GRANULOCYTE: <0.03 K/UL (ref 0–0.3)
ABSOLUTE LYMPH #: 1.85 K/UL (ref 1.1–3.7)
ABSOLUTE MONO #: 0.5 K/UL (ref 0.1–1.2)
ALBUMIN SERPL-MCNC: 4.2 G/DL (ref 3.5–5.2)
ALBUMIN/GLOBULIN RATIO: 1.6 (ref 1–2.5)
ALP BLD-CCNC: 63 U/L (ref 35–104)
ALT SERPL-CCNC: 26 U/L (ref 5–33)
ANION GAP SERPL CALCULATED.3IONS-SCNC: 15 MMOL/L (ref 9–17)
AST SERPL-CCNC: 28 U/L
BASOPHILS # BLD: 0 % (ref 0–2)
BASOPHILS ABSOLUTE: <0.03 K/UL (ref 0–0.2)
BILIRUB SERPL-MCNC: 0.44 MG/DL (ref 0.3–1.2)
BUN BLDV-MCNC: 21 MG/DL (ref 8–23)
CALCIUM SERPL-MCNC: 9.5 MG/DL (ref 8.6–10.4)
CHLORIDE BLD-SCNC: 106 MMOL/L (ref 98–107)
CHOLESTEROL, FASTING: 155 MG/DL
CHOLESTEROL, FASTING: 155 MG/DL
CHOLESTEROL/HDL RATIO: 3.1
CHOLESTEROL/HDL RATIO: 3.1
CO2: 23 MMOL/L (ref 20–31)
CREAT SERPL-MCNC: 0.87 MG/DL (ref 0.5–0.9)
EOSINOPHILS RELATIVE PERCENT: 5 % (ref 1–4)
ESTIMATED AVERAGE GLUCOSE: 108 MG/DL
GFR AFRICAN AMERICAN: >60 ML/MIN
GFR NON-AFRICAN AMERICAN: >60 ML/MIN
GFR SERPL CREATININE-BSD FRML MDRD: NORMAL ML/MIN/{1.73_M2}
GLUCOSE BLD-MCNC: 88 MG/DL (ref 70–99)
HBA1C MFR BLD: 5.4 % (ref 4–6)
HCT VFR BLD CALC: 40.9 % (ref 36.3–47.1)
HDLC SERPL-MCNC: 50 MG/DL
HDLC SERPL-MCNC: 50 MG/DL
HEMOGLOBIN: 13.4 G/DL (ref 11.9–15.1)
IMMATURE GRANULOCYTES: 0 %
LDL CHOLESTEROL: 80 MG/DL (ref 0–130)
LDL CHOLESTEROL: 80 MG/DL (ref 0–130)
LYMPHOCYTES # BLD: 30 % (ref 24–43)
MCH RBC QN AUTO: 30.1 PG (ref 25.2–33.5)
MCHC RBC AUTO-ENTMCNC: 32.8 G/DL (ref 28.4–34.8)
MCV RBC AUTO: 91.9 FL (ref 82.6–102.9)
MONOCYTES # BLD: 8 % (ref 3–12)
NRBC AUTOMATED: 0 PER 100 WBC
PDW BLD-RTO: 14 % (ref 11.8–14.4)
PLATELET # BLD: 169 K/UL (ref 138–453)
PMV BLD AUTO: 11.6 FL (ref 8.1–13.5)
POTASSIUM SERPL-SCNC: 4.8 MMOL/L (ref 3.7–5.3)
RBC # BLD: 4.45 M/UL (ref 3.95–5.11)
SEG NEUTROPHILS: 57 % (ref 36–65)
SEGMENTED NEUTROPHILS ABSOLUTE COUNT: 3.47 K/UL (ref 1.5–8.1)
SODIUM BLD-SCNC: 144 MMOL/L (ref 135–144)
THYROXINE, FREE: 1.05 NG/DL (ref 0.93–1.7)
TOTAL CK: 158 U/L (ref 26–192)
TOTAL PROTEIN: 6.8 G/DL (ref 6.4–8.3)
TRIGLYCERIDE, FASTING: 126 MG/DL
TRIGLYCERIDE, FASTING: 126 MG/DL
TSH SERPL DL<=0.05 MIU/L-ACNC: 1.63 UIU/ML (ref 0.3–5)
WBC # BLD: 6.2 K/UL (ref 3.5–11.3)

## 2022-06-08 PROCEDURE — 84443 ASSAY THYROID STIM HORMONE: CPT

## 2022-06-08 PROCEDURE — 84439 ASSAY OF FREE THYROXINE: CPT

## 2022-06-08 PROCEDURE — 80061 LIPID PANEL: CPT

## 2022-06-08 PROCEDURE — 80053 COMPREHEN METABOLIC PANEL: CPT

## 2022-06-08 PROCEDURE — 36415 COLL VENOUS BLD VENIPUNCTURE: CPT

## 2022-06-08 PROCEDURE — 82550 ASSAY OF CK (CPK): CPT

## 2022-06-08 PROCEDURE — 83036 HEMOGLOBIN GLYCOSYLATED A1C: CPT

## 2022-06-08 PROCEDURE — 85025 COMPLETE CBC W/AUTO DIFF WBC: CPT

## 2022-07-19 ENCOUNTER — OFFICE VISIT (OUTPATIENT)
Dept: PULMONOLOGY | Age: 67
End: 2022-07-19
Payer: MEDICARE

## 2022-07-19 ENCOUNTER — HOSPITAL ENCOUNTER (OUTPATIENT)
Age: 67
Setting detail: SPECIMEN
Discharge: HOME | End: 2022-07-19
Payer: MEDICARE

## 2022-07-19 VITALS
BODY MASS INDEX: 35.62 KG/M2 | SYSTOLIC BLOOD PRESSURE: 101 MMHG | WEIGHT: 213.8 LBS | DIASTOLIC BLOOD PRESSURE: 67 MMHG | OXYGEN SATURATION: 96 % | TEMPERATURE: 97.7 F | HEART RATE: 77 BPM | HEIGHT: 65 IN | RESPIRATION RATE: 16 BRPM

## 2022-07-19 DIAGNOSIS — G47.33 OSA ON CPAP: ICD-10-CM

## 2022-07-19 DIAGNOSIS — J30.9 ALLERGIC RHINITIS, UNSPECIFIED SEASONALITY, UNSPECIFIED TRIGGER: ICD-10-CM

## 2022-07-19 DIAGNOSIS — Z99.89 OSA ON CPAP: ICD-10-CM

## 2022-07-19 DIAGNOSIS — E66.9 OBESITY (BMI 35.0-39.9 WITHOUT COMORBIDITY): ICD-10-CM

## 2022-07-19 DIAGNOSIS — J45.901 MODERATE ASTHMA WITH ACUTE EXACERBATION, UNSPECIFIED WHETHER PERSISTENT: Primary | ICD-10-CM

## 2022-07-19 LAB
ALBUMIN SERPL-MCNC: 4.3 G/DL (ref 3.5–5.2)
ALBUMIN/GLOBULIN RATIO: 1.6 (ref 1–2.5)
ALP BLD-CCNC: 70 U/L (ref 35–104)
ALT SERPL-CCNC: 34 U/L (ref 5–33)
ANION GAP SERPL CALCULATED.3IONS-SCNC: 14 MMOL/L (ref 9–17)
AST SERPL-CCNC: 28 U/L
BILIRUB SERPL-MCNC: 0.46 MG/DL (ref 0.3–1.2)
BUN BLDV-MCNC: 23 MG/DL (ref 8–23)
CALCIUM SERPL-MCNC: 9.2 MG/DL (ref 8.6–10.4)
CHLORIDE BLD-SCNC: 107 MMOL/L (ref 98–107)
CO2: 23 MMOL/L (ref 20–31)
CREAT SERPL-MCNC: 0.82 MG/DL (ref 0.5–0.9)
CREATININE URINE: 201.3 MG/DL (ref 28–217)
GFR AFRICAN AMERICAN: >60 ML/MIN
GFR NON-AFRICAN AMERICAN: >60 ML/MIN
GFR SERPL CREATININE-BSD FRML MDRD: ABNORMAL ML/MIN/{1.73_M2}
GLUCOSE BLD-MCNC: 99 MG/DL (ref 70–99)
MICROALBUMIN/CREAT 24H UR: <12 MG/L
MICROALBUMIN/CREAT UR-RTO: NORMAL MCG/MG CREAT
POTASSIUM SERPL-SCNC: 4.8 MMOL/L (ref 3.7–5.3)
SODIUM BLD-SCNC: 144 MMOL/L (ref 135–144)
T3 FREE: 3.15 PG/ML (ref 2.02–4.43)
THYROXINE, FREE: 1.13 NG/DL (ref 0.93–1.7)
TOTAL PROTEIN: 7 G/DL (ref 6.4–8.3)
TSH SERPL DL<=0.05 MIU/L-ACNC: 0.76 UIU/ML (ref 0.3–5)
VITAMIN D 25-HYDROXY: 38 NG/ML

## 2022-07-19 PROCEDURE — 3017F COLORECTAL CA SCREEN DOC REV: CPT | Performed by: INTERNAL MEDICINE

## 2022-07-19 PROCEDURE — 1036F TOBACCO NON-USER: CPT | Performed by: INTERNAL MEDICINE

## 2022-07-19 PROCEDURE — 1123F ACP DISCUSS/DSCN MKR DOCD: CPT | Performed by: INTERNAL MEDICINE

## 2022-07-19 PROCEDURE — 82570 ASSAY OF URINE CREATININE: CPT

## 2022-07-19 PROCEDURE — 80053 COMPREHEN METABOLIC PANEL: CPT

## 2022-07-19 PROCEDURE — 82043 UR ALBUMIN QUANTITATIVE: CPT

## 2022-07-19 PROCEDURE — G8427 DOCREV CUR MEDS BY ELIG CLIN: HCPCS | Performed by: INTERNAL MEDICINE

## 2022-07-19 PROCEDURE — G8417 CALC BMI ABV UP PARAM F/U: HCPCS | Performed by: INTERNAL MEDICINE

## 2022-07-19 PROCEDURE — 36415 COLL VENOUS BLD VENIPUNCTURE: CPT

## 2022-07-19 PROCEDURE — 82306 VITAMIN D 25 HYDROXY: CPT

## 2022-07-19 PROCEDURE — 84439 ASSAY OF FREE THYROXINE: CPT

## 2022-07-19 PROCEDURE — G8399 PT W/DXA RESULTS DOCUMENT: HCPCS | Performed by: INTERNAL MEDICINE

## 2022-07-19 PROCEDURE — 1090F PRES/ABSN URINE INCON ASSESS: CPT | Performed by: INTERNAL MEDICINE

## 2022-07-19 PROCEDURE — 99213 OFFICE O/P EST LOW 20 MIN: CPT | Performed by: INTERNAL MEDICINE

## 2022-07-19 PROCEDURE — 84443 ASSAY THYROID STIM HORMONE: CPT

## 2022-07-19 PROCEDURE — 84481 FREE ASSAY (FT-3): CPT

## 2022-07-19 ASSESSMENT — SLEEP AND FATIGUE QUESTIONNAIRES
HOW LIKELY ARE YOU TO NOD OFF OR FALL ASLEEP WHILE WATCHING TV: 0
HOW LIKELY ARE YOU TO NOD OFF OR FALL ASLEEP WHILE SITTING AND READING: 0
ESS TOTAL SCORE: 0
HOW LIKELY ARE YOU TO NOD OFF OR FALL ASLEEP WHEN YOU ARE A PASSENGER IN A CAR FOR AN HOUR WITHOUT A BREAK: 0
HOW LIKELY ARE YOU TO NOD OFF OR FALL ASLEEP WHILE SITTING QUIETLY AFTER LUNCH WITHOUT ALCOHOL: 0
HOW LIKELY ARE YOU TO NOD OFF OR FALL ASLEEP IN A CAR, WHILE STOPPED FOR A FEW MINUTES IN TRAFFIC: 0
HOW LIKELY ARE YOU TO NOD OFF OR FALL ASLEEP WHILE SITTING INACTIVE IN A PUBLIC PLACE: 0
HOW LIKELY ARE YOU TO NOD OFF OR FALL ASLEEP WHILE LYING DOWN TO REST IN THE AFTERNOON WHEN CIRCUMSTANCES PERMIT: 0
HOW LIKELY ARE YOU TO NOD OFF OR FALL ASLEEP WHILE SITTING AND TALKING TO SOMEONE: 0

## 2022-07-19 NOTE — PROGRESS NOTES
PULMONARY OUTPATIENT PROGRESS NOTE      Patient:  Rosette Ceja  YOB: 1955    MRN: [id number]     Acct:        Pt seen and Chart reviewed. Mr/Ms Rosette Ceja is here in followup for    Diagnosis Orders   1. Moderate asthma with acute exacerbation, unspecified whether persistent        2. MARSHAL on CPAP        3. Allergic rhinitis, unspecified seasonality, unspecified trigger        4. Obesity (BMI 35.0-39.9 without comorbidity)          She is here for follow-up of history of MARSHAL/ history of chronic cough/cough variant asthma. Since she was seen last time about a year ago she did not have any exacerbation no steroids or antibiotic use. According to patient she is very active. She go to work until he does do 4 days a week she does not complain of shortness of breath. She denies dyspnea on exertion she is able to do her regular activities at home she does cleaning, walking with the dog outside. She does not complain of cough according to patient her cough is completely gone she does not take Serevent inhaler and she does not take Singulair. She denies nocturnal awakening with cough wheezing chest tightness or shortness of breath. She denies sputum production and denies change in color of the sputum volume the sputum. Her postnasal drip and nasal congestion symptoms is controlled she take Flonase over-the-counter [employer] brand 27 mcg once daily and control her symptoms. She does not take Astelin nasal spray. She use Xopenex as needed and the last time she used Xopenex was a month ago. She is very compliant with CPAP she use CPAP every night she does not sleep without CPAP. She denies any problem with the CPAP and she use CPAP with nasal mask/pillows. She usually goes to sleep around 10 PM and wake up between 4:56 in the morning when she has to go to work other days she wake up 9 in the morning.   She wake up once to go to bathroom. Last compliance data available from 04/19/2022  to 07/17/2022 compliance was 99% average usage is 7 hours 49 minutes she is on auto CPAP of pressure minimum of 6 and pressure maximum of 10 and average AHI is 1.9. She had bariatric surgery done Anil-en-Y bypass in January 2020. According to patient she had lost 160 pounds total and she had been feeling well since she had lost weight. Her weight has now been stable. She is followed by GI and her symptoms of GERD are controlled on PPI and since she had the gastric bypass her symptoms are controlled and she continued on PPI once daily.     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 and was on Xopenex in the past and for last 1 to 1.5  year or more she had no problem with cough, sob or wheezing and had not required any bronchodilators and was doing very well until episode 3-4 months ago        Subjective:   Review of Systems -   General ROS: negative for fever, night sweats or wt loss  ENT ROS: Negative for -  nasal congestion and postnasal drip  Allergy and Immunology ROS: positive for - seasonal allergies  Respiratory ROS: Negative for dry cough, No shortness of breath on walking , no Wheezing, no orthopnea and no PND  Cardiovascular ROS: no chest pain or dyspnea on exertion  Gastrointestinal ROS: no abdominal pain, change in bowel habits, or black or bloody stools, negative for reflux and heartburn  Musculoskeletal ROS: negative for - numbness tingling of extremeties, joints pain and joint swelling   Hem/Onc: negative for bleeding and bruising  Skin: negative for rash and skin lesion   : negative for hematuria, dysuria, frequency and nocturia  CNS: negative for dizziness, weakness, diplopia, tingling numbness headache seizure      Sleep Medicine 7/14/2020 8/31/2016   Sitting and reading 0 0   Watching TV 0 0   Sitting, inactive in a public place (e.g. a theatre or a meeting) 0 0   As a passenger in a car for an hour without a break 0 0   Lying down to rest in the afternoon when circumstances permit 0 0   Sitting and talking to someone 0 0   Sitting quietly after a lunch without alcohol 0 0   In a car, while stopped for a few minutes in traffic 0 0   Total score 0 0       Allergies: Allergies   Allergen Reactions    Biaxin [Clarithromycin] Hives    Ceclor [Cefaclor] Hives    Duricef [Cefadroxil] Hives    Medrol [Methylprednisolone] Other (See Comments)     Swelling of tongue. Penicillins Hives    Percocet [Oxycodone-Acetaminophen] Shortness Of Breath     Only the     Prednisone Swelling     IV and oral. Makes tongue swell. Albuterol Other (See Comments)     Burning in chest  Burning in chest    Hydrocodone-Acetaminophen Hives    Isoniazid Hives    Nickel      Other reaction(s):  Allergy: RASH;    Norco [Hydrocodone-Acetaminophen] Hives    Other      Other reaction(s): Unknown    Paregoric Hives    Fentanyl Nausea And Vomiting    Morphine Nausea And Vomiting       Medications:  Outpatient Encounter Medications as of 7/19/2022   Medication Sig Dispense Refill    levalbuterol (XOPENEX HFA) 45 MCG/ACT inhaler Inhale 1 puff into the lungs every 4 hours as needed for Wheezing 1 each 3    azelastine (ASTELIN) 0.1 % nasal spray 2 sprays by Nasal route 2 times daily Use in each nostril as directed 1 each 5    levalbuterol (XOPENEX) 0.63 MG/3ML nebulization Take 3 mLs by nebulization every 6 hours as needed for Wheezing 120 each 0    brexpiprazole (REXULTI) 0.5 MG TABS tablet Rexulti 0.5 mg tablet      levothyroxine (SYNTHROID) 50 MCG tablet       liothyronine (CYTOMEL) 5 MCG tablet       montelukast (SINGULAIR) 10 MG tablet montelukast 10 mg tablet      nystatin (MYCOSTATIN) 167566 UNIT/GM powder Nyamyc 100,000 unit/gram topical powder      oxyCODONE-acetaminophen (PERCOCET) 5-325 MG per tablet Take 1 tablet by mouth as needed. trospium (SANCTURA) 20 MG tablet Take 1 tablet by mouth 2 times daily 60 tablet 1    calcium carbonate 1500 (600 Ca) MG TABS tablet calcium carbonate 600 mg calcium (1,500 mg) tablet      escitalopram (LEXAPRO) 10 MG tablet Take 10 mg by mouth daily       esomeprazole (NEXIUM) 40 MG delayed release capsule Take 40 mg by mouth every morning (before breakfast)      WP THYROID PO Take by mouth      famotidine (PEPCID) 40 MG tablet 2 times daily       Cyanocobalamin (VITAMIN B-12) 50 MCG LOZG as directed      Prenatal Vit-Fe Fumarate-FA (PRENATAL VITAMIN PO) Take 1 tablet by mouth daily      Cholecalciferol (VITAMIN D3) 5000 units TABS Take 5,000 Units by mouth daily       bumetanide (BUMEX) 1 MG tablet Take 1 mg by mouth three times a week Every Monday Wednesday and friday       gabapentin (NEURONTIN) 100 MG capsule Take 100 mg by mouth 3 times daily. [place] esomeprazole (NEXIUM) 24.65 MG CPDR capsule Take 1 capsule by mouth daily       spironolactone (ALDACTONE) 50 MG tablet Take 50 mg by mouth daily       LORazepam (ATIVAN) 0.5 MG tablet Take 0.5 mg by mouth 2 times daily as needed. [place] aspirin 81 MG EC tablet Take 1 tablet by mouth daily. 30 tablet 1     No facility-administered encounter medications on file as of 7/19/2022. Objective:    Physical Exam:  Vitals:   /67 (Site: Left Upper Arm)   Pulse 77   Temp 97.7 °F (36.5 °C)   Resp 16   Ht 5' 5\" (1.651 m)   Wt 213 lb 12.8 oz (97 kg)   SpO2 96% Comment: room air at rest  BMI 35.58 kg/m²   Last 3 weights: Wt Readings from Last 3 Encounters:   07/19/22 213 lb 12.8 oz (97 kg)   04/04/22 218 lb (98.9 kg)   02/25/22 224 lb (101.6 kg)     Body mass index is 35.58 kg/m².     Physical Examination:   General appearance - alert, well appearing, and in no distress  Mental status - alert, oriented to person, place, and time  Eyes - pupils equal and reactive, extraocular eye movements intact  Ears - bilateral  external ear canals normal  Nose - normal and patent, no erythema, discharge or polyps, deviated nasal septum to Left  Mouth - mucous membranes moist, pharynx normal without lesions, small oropharynx, Mallampati 1  Neck - supple, no significant adenopathy, short and thick neck  Chest - clear to auscultation, no wheezes, rales or rhonchi, symmetric air entry, no increase work of breathing. Heart - normal rate, regular rhythm, normal S1, S2, no murmurs, rubs, clicks or gallops  Abdomen - soft, nontender, nondistended, no masses or organomegaly  Neurological - alert, oriented, normal speech, no focal findings or movement disorder noted  Extremities - peripheral pulses normal, + chronic pedal edema, no clubbing or cyanosis.   Chronic varicose vein  Skin - normal coloration and turgor, no rashes, no suspicious skin lesions noted           Labs:  None    CBC:   WBC   Date Value Ref Range Status   06/08/2022 6.2 3.5 - 11.3 k/uL Final     Hemoglobin   Date Value Ref Range Status   06/08/2022 13.4 11.9 - 15.1 g/dL Final     Platelet Count   Date Value Ref Range Status   04/23/2012 176 130 - 400 k/uL Final     Platelets   Date Value Ref Range Status   06/08/2022 169 138 - 453 k/uL Final     BMP:   Sodium   Date Value Ref Range Status   06/08/2022 144 135 - 144 mmol/L Final     Potassium   Date Value Ref Range Status   06/08/2022 4.8 3.7 - 5.3 mmol/L Final     Chloride   Date Value Ref Range Status   06/08/2022 106 98 - 107 mmol/L Final     CO2   Date Value Ref Range Status   06/08/2022 23 20 - 31 mmol/L Final     BUN   Date Value Ref Range Status   06/08/2022 21 8 - 23 mg/dL Final     CREATININE   Date Value Ref Range Status   06/08/2022 0.87 0.50 - 0.90 mg/dL Final   05/24/2022 0.82 0.50 - 0.90 mg/dL Final   11/20/2021 0.76 0.50 - 0.90 mg/dL Final     Glucose   Date Value Ref Range Status   06/08/2022 88 70 - 99 mg/dL Final   04/23/2012 145 (H) 74 - 106 mg/dL Final     S. Calcium:   Calcium   Date Value Ref Range Status   06/08/2022 9.5 8.6 - 10.4 mg/dL Final     S. Ionized Calcium: No results found for: IONCA  S. Magnesium:   Magnesium   Date Value Ref Range Status   09/19/2018 1.8 1.6 - 2.6 mg/dL Final     S. Phosphorus: No results found for: PHOS  S. Glucose:   POC Glucose   Date Value Ref Range Status   12/10/2018 175 (H) 65 - 105 mg/dL Final   08/23/2017 130 (H) 65 - 105 mg/dL Final   08/08/2013 111 (H) 65 - 105 mg/dL Final     Glycosylated hemoglobin A1C:   Hemoglobin A1C   Date Value Ref Range Status   06/08/2022 5.4 4.0 - 6.0 % Final       Pulmonary Functions Testing Results:    3/24/2015: FEV1 2.92  119%, FVC 3.48 105 %, CKH7UMR 113%, TLC 5.15 96.7%, DLCO 20.78 106%    Blood Gases:   pH   Date Value Ref Range Status   08/06/2013 7.36  Final     PCO2   Date Value Ref Range Status   08/06/2013 42 mm Hg Final     PO2   Date Value Ref Range Status   08/06/2013 99 mm Hg Final     HCO3   Date Value Ref Range Status   08/06/2013 24.0 23 - 31 mmol/L Final     O2 Sat   Date Value Ref Range Status   08/06/2013 97 % Final       Radiological reports:    CXR   2/28/17  Lung parenchyma is clear without focal   airspace consolidation, sizeable pleural effusion, or pneumothorax       2/9/2016  Sternotomy wires are present. The heart appears normal size. Pulmonary vasculature is unremarkable. The lungs are clear. No free air. No displaced rib fractures are seen. CT A Scans chest 2/28/17  FINDINGS:     No evidence of pulmonary emboli. No hilar or mediastinal masses are seen. Thoracic aorta without acute abnormality. No evidence of aneurysm or dissection. Lungs are free of consolidation. No pneumothorax or pleural effusion. No mass lesions are present. Minimal atelectasis at the left lung base.     No acute findings in the upper abdomen. Diffuse fatty infiltration of the liver. Impression:     No significant, acute cardiopulmonary abnormalities. No evidence of pulmonary embolus. Sleep Study 4/6/2015  Mild to Moderate MARSHAL with AHI of 14.4 and RDI of 15.1 and CPAP titration to cpap of 10 cm     Compliance data  04/19/2022 to 07/17/2022. Compliance to 99%. Average usage 7 hours and 49 minutes  Average AHI one-point  Auto CPAP at 6/10 cm    Assessment:    ICD-10-CM    1. MARSHAL on CPAP  G47.33     Z99.89    2. Cough variant asthma  J45.991    3. Allergic rhinitis, unspecified seasonality, unspecified trigger  J30.9        Plan:    Patient is very compliant with CPAP. Compliance data is available. She is getting benefit from use of CPAP and is clinically and symptomatically much better. Continue Auto cpap with P min of 6 and P max of 10 cm  Patient to call for Rx for cpap supplies if needed. CPAP at least 4 hrs qhs  Wt loss is recommended and discussed  Follow good sleep hygeine instructions  Use humidifier  Compliance data seen and  pt is compliant per insurance requirements     On nexium daily per GI and follow up GI at [facility]  Avoid allergens and exposure  Continue to observe off bronchodilators. Xopenex to continue as needed. Continue with Flonase nasal spray  She had Covid vaccine both the doses and booster  Recommend annual flu vaccine in fall. Uptodate on Pneumonia vaccine by PCP    Questions answered pertaining to diagnosis and management explained importance of compliance with therapy        RTC 1 year    Please note that this chart was generated using voice recognition Dragon dictation software. Although every effort was made to ensure the accuracy of this automated transcription, some errors in transcription may have occurred.     Mary Macias MD, MD             7/19/2022, 12:07 PM

## 2022-11-09 ENCOUNTER — OFFICE VISIT (OUTPATIENT)
Dept: PODIATRY | Age: 67
End: 2022-11-09
Payer: MEDICARE

## 2022-11-09 VITALS — WEIGHT: 214 LBS | HEIGHT: 65 IN | BODY MASS INDEX: 35.65 KG/M2

## 2022-11-09 DIAGNOSIS — M77.52 BURSITIS OF LEFT FOOT: Primary | ICD-10-CM

## 2022-11-09 DIAGNOSIS — M79.605 PAIN OF LEFT LOWER EXTREMITY: ICD-10-CM

## 2022-11-09 DIAGNOSIS — M21.622 TAILOR'S BUNIONETTE, LEFT: ICD-10-CM

## 2022-11-09 PROCEDURE — 1123F ACP DISCUSS/DSCN MKR DOCD: CPT | Performed by: PODIATRIST

## 2022-11-09 PROCEDURE — G8399 PT W/DXA RESULTS DOCUMENT: HCPCS | Performed by: PODIATRIST

## 2022-11-09 PROCEDURE — G8427 DOCREV CUR MEDS BY ELIG CLIN: HCPCS | Performed by: PODIATRIST

## 2022-11-09 PROCEDURE — 1090F PRES/ABSN URINE INCON ASSESS: CPT | Performed by: PODIATRIST

## 2022-11-09 PROCEDURE — 99213 OFFICE O/P EST LOW 20 MIN: CPT | Performed by: PODIATRIST

## 2022-11-09 PROCEDURE — G8417 CALC BMI ABV UP PARAM F/U: HCPCS | Performed by: PODIATRIST

## 2022-11-09 PROCEDURE — 3017F COLORECTAL CA SCREEN DOC REV: CPT | Performed by: PODIATRIST

## 2022-11-09 PROCEDURE — G8484 FLU IMMUNIZE NO ADMIN: HCPCS | Performed by: PODIATRIST

## 2022-11-09 PROCEDURE — 1036F TOBACCO NON-USER: CPT | Performed by: PODIATRIST

## 2022-11-09 PROCEDURE — 20605 DRAIN/INJ JOINT/BURSA W/O US: CPT | Performed by: PODIATRIST

## 2022-11-09 NOTE — PROGRESS NOTES
[address] [facility] [place] [facility] [address] [address]  Dept: [phone]  Dept Fax: [phone]    RETURN PATIENT PROGRESS NOTE  Date of patient's visit: 11/9/2022  Patient's Name:  Nella Henderson YOB: 1955            Patient Care Team:  LIZETTE Roldan CNP as PCP - General (Nurse Practitioner)  Harry Otoole MD as Surgeon (Cardiothoracic Surgery)  Kenia Aparicio MD as Consulting Physician (Pulmonology)  Nael Tipton DPM as Physician (Podiatry)       Nella Webbkarma 78 y.o. female that presents for follow-up of   Chief Complaint   Patient presents with    Toe Pain     5th toe on the left - onset 1 + month w/o change    Blister     5th toe on the left foot     Pt's primary care physician is LIZETTE Roldan CNP last seen 9/20/22  Symptoms began 6 - 7 month(s) ago and are increased to her left foot. Patient relates pain is Present. States she has been walking more than usual in new shoes. Pain is rated 7 out of 10 and is described as constant, mild, moderate. Treatments prior to today's visit include: previous podiatry treatment. Currently denies F/C/N/V. Allergies   Allergen Reactions    Biaxin [Clarithromycin] Hives    Ceclor [Cefaclor] Hives    Duricef [Cefadroxil] Hives    Medrol [Methylprednisolone] Other (See Comments)     Swelling of tongue. Penicillins Hives    Percocet [Oxycodone-Acetaminophen] Shortness Of Breath     Only the     Prednisone Swelling     IV and oral. Makes tongue swell. Albuterol Other (See Comments)     Burning in chest  Burning in chest    Hydrocodone-Acetaminophen Hives    Isoniazid Hives    Nickel      Other reaction(s):  Allergy: RASH;    Norco [Hydrocodone-Acetaminophen] Hives    Other      Other reaction(s): Unknown    Paregoric Hives    Fentanyl Nausea And Vomiting    Morphine Nausea And Vomiting       Past Medical History:   Diagnosis Date    Allergic rhinitis Arthritis     Asthma     Bunion of left foot     CAD (coronary artery disease) 8/6/13    cabg x 2    Chlorine inhalation lung injury ([facility] [address]     Severe irritability and airway reactivity     Chronic back pain     Cough variant asthma     Dyslipidemia     Dyspnea on exertion     Eczema     GERD (gastroesophageal reflux disease)     Headache     MIGRAINES    MVP (mitral valve prolapse)     Obesity     Morbid obesity with BMI of 50.0-59.9, adult    MARSHAL on CPAP     Poison ivy     States in lungs    Sleep apnea     COMPLIANT ON CPAP     Thoracic outlet syndrome     bilateral    Thyroid disease        Prior to Admission medications    Medication Sig Start Date End Date Taking? Authorizing Provider   [facility]/ACT inhaler Inhale 1 puff into the lungs every 4 hours as needed for Wheezing 5/16/22 5/16/23 Yes Delicia Zavala MD   azelastine (ASTELIN) 0.1 % nasal spray 2 sprays by Nasal route 2 times daily Use in each nostril as directed 5/16/22  Yes Delicia Zavala MD   levalbuterol (XOPENEX) 0.63 MG/3ML nebulization Take 3 mLs by nebulization every 6 hours as needed for Wheezing 5/13/22  Yes Delicia Zavala MD   brexpiprazole (REXULTI) 0.5 MG TABS tablet Rexulti 0.5 mg tablet   Yes Historical Provider, MD   levothyroxine (SYNTHROID) 50 MCG tablet  2/1/22  Yes Historical Provider, MD   liothyronine (CYTOMEL) 5 MCG tablet  3/28/22  Yes Historical Provider, MD   montelukast (SINGULAIR) 10 MG tablet montelukast 10 mg tablet   Yes Historical Provider, MD   nystatin (MYCOSTATIN) 245538 UNIT/GM powder Nyamyc 100,000 unit/gram topical powder 2/28/22  Yes Historical Provider, MD   oxyCODONE-acetaminophen (PERCOCET) 5-325 MG per tablet Take 1 tablet by mouth as needed.  2/23/22  Yes Historical Provider, MD   trospium (SANCTURA) 20 MG tablet Take 1 tablet by mouth 2 times daily 2/25/22  Yes Sheridan Oh MD   calcium carbonate 1500 (600 Ca) MG TABS tablet calcium carbonate 600 mg calcium (1,500 mg) tablet   Yes Historical Provider, MD   escitalopram (LEXAPRO) 10 MG tablet Take 10 mg by mouth daily  6/4/21  Yes Historical Provider, MD   esomeprazole (NEXIUM) 40 MG delayed release capsule Take 40 mg by mouth every morning (before breakfast)   Yes Historical Provider, MD   [facility] THYROID PO Take by mouth   Yes Historical Provider, MD   famotidine (PEPCID) 40 MG tablet 2 times daily    Yes Historical Provider, MD   Cyanocobalamin (VITAMIN B-12) 50 MCG LOZG as directed   Yes Historical Provider, MD   Prenatal Vit-Fe Fumarate-FA (PRENATAL VITAMIN PO) Take 1 tablet by mouth daily 1/15/20  Yes Historical Provider, MD   Cholecalciferol (VITAMIN D3) 5000 units TABS Take 5,000 Units by mouth daily    Yes Historical Provider, MD   bumetanide (BUMEX) 1 MG tablet Take 1 mg by mouth three times a week Every Monday Wednesday and friday    Yes Historical Provider, MD   gabapentin (NEURONTIN) 100 MG capsule Take 100 mg by mouth 3 times daily. .   Yes Historical Provider, MD   esomeprazole (NEXIUM) 24.65 MG CPDR capsule Take 1 capsule by mouth daily    Yes Historical Provider, MD   spironolactone (ALDACTONE) 50 MG tablet Take 50 mg by mouth daily    Yes Historical Provider, MD   LORazepam (ATIVAN) 0.5 MG tablet Take 0.5 mg by mouth 2 times daily as needed. .   Yes Historical Provider, MD   aspirin 81 MG EC tablet Take 1 tablet by mouth daily. 8/8/13  Yes Peggy Gonzalez MD       Review of Systems    Review of Systems:  History obtained from chart review and the patient  General ROS: negative for - chills, fatigue, fever, night sweats or weight gain  Constitutional: Negative for chills, diaphoresis, fatigue, fever and unexpected weight change. Musculoskeletal: Positive for arthralgias, gait problem and joint swelling. Neurological ROS: negative for - behavioral changes, confusion, headaches or seizures. Negative for weakness and numbness. Dermatological ROS: negative for - mole changes, rash  Cardiovascular: Negative for leg swelling. Gastrointestinal: Negative for constipation, diarrhea, nausea and vomiting. Lower Extremity Physical Examination:     Vitals: There were no vitals filed for this visit. General: AAO x 3 in NAD. Dermatologic Exam:  Skin lesion/ulceration Absent . Skin No rashes or nodules noted. .       Musculoskeletal:     1st MPJ ROM decreased, Bilateral. +POP left 5th MTH along gonzales prominence laterally. Muscle strength 5/5, Bilateral.  Pain present upon palpation of left lateral 5th MPJ with fluctuance. Medial longitudinal arch, Bilateral WNL. Ankle ROM WNL,Bilateral.    Dorsally contracted digits absent digits 1-5 Bilateral.     Vascular: DP and PT pulses palpable 2/4, Bilateral.  CFT <3 seconds, Bilateral.  Hair growth present to the level of the digits, Bilateral.  Edema absent, Bilateral.  Varicosities absent, Bilateral. Erythema absent, Bilateral    Neurological: Sensation intact to light touch to level of digits, Bilateral.  Protective sensation intact 10/10 sites via 5.07/10g Pickett-Sam Monofilament, Bilateral.  negative Tinel's, Bilateral.  negative Valleix sign, Bilateral.      Integument: Warm, dry, supple, Bilateral.  Open lesion absent, Bilateral.  Interdigital maceration absent to web spaces 1-4, Bilateral.  Nails are normal in length, thickness and color 1-5 bilateral.  Fissures absent, Bilateral.     Asessment: Patient is a 78 y.o. female with:    Diagnosis Orders   1. Bursitis of left foot  betamethasone acetate-betamethasone sodium phosphate (CELESTONE) injection 6 mg    17859 - MS DRAIN/INJECT INTERMEDIATE JOINT/BURSA      2. Tailor's bunionette, left  betamethasone acetate-betamethasone sodium phosphate (CELESTONE) injection 6 mg    61300 - MS DRAIN/INJECT INTERMEDIATE JOINT/BURSA      3.  Pain of left lower extremity  betamethasone acetate-betamethasone sodium phosphate (CELESTONE) injection 6 mg    12715 - MS DRAIN/INJECT INTERMEDIATE JOINT/BURSA            Plan: Patient examined and evaluated. Current condition and treatment options discussed in detail. A steroid injection was performed at left lateral 5th MPJ using 1% plain Lidocaine and 6 mg of Celestone. This was well tolerated. Advised pt to avoid wearing narrow shoes. Recommend orthotics with 5th MTH cut out. Ice and elevate at home. All labs were reviewed and all imagining including the above findings were reviewed PRIOR to the patients arrival and with the patient today. Previous patient encounter was reviewed. Encounters from the patients other medical providers were reviewed and noted. Time was spent educating the patient on proper care of the feet and ankles. All the above diagnosis were addressed at todays visit and all questions were answered. A total of 25 minutes was spent with this patients encounter which included charting after the patients visit    . Verbal and written instructions given to patient. Contact office with any questions/problems/concerns. No orders of the defined types were placed in this encounter. No orders of the defined types were placed in this encounter. RTC in 2month(s).     11/9/2022      Electronically signed by Karla Thao DPM on 11/9/2022 at 3:42 PM  11/9/2022

## 2022-11-14 RX ORDER — BETAMETHASONE SODIUM PHOSPHATE AND BETAMETHASONE ACETATE 3; 3 MG/ML; MG/ML
6 INJECTION, SUSPENSION INTRA-ARTICULAR; INTRALESIONAL; INTRAMUSCULAR; SOFT TISSUE ONCE
Status: COMPLETED | OUTPATIENT
Start: 2022-11-14 | End: 2022-11-16

## 2022-11-16 RX ADMIN — BETAMETHASONE SODIUM PHOSPHATE AND BETAMETHASONE ACETATE 6 MG: 3; 3 INJECTION, SUSPENSION INTRA-ARTICULAR; INTRALESIONAL; INTRAMUSCULAR; SOFT TISSUE at 10:00

## 2022-12-06 ENCOUNTER — HOSPITAL ENCOUNTER (OUTPATIENT)
Age: 67
Discharge: HOME | End: 2022-12-06
Payer: MEDICARE

## 2022-12-06 LAB
ABSOLUTE EOS #: 0.2 K/UL (ref 0–0.44)
ABSOLUTE IMMATURE GRANULOCYTE: <0.03 K/UL (ref 0–0.3)
ABSOLUTE LYMPH #: 2.26 K/UL (ref 1.1–3.7)
ABSOLUTE MONO #: 0.54 K/UL (ref 0.1–1.2)
ALBUMIN SERPL-MCNC: 4 G/DL (ref 3.5–5.2)
ALBUMIN/GLOBULIN RATIO: 1.4 (ref 1–2.5)
ALP BLD-CCNC: 72 U/L (ref 35–104)
ALT SERPL-CCNC: 27 U/L (ref 5–33)
ANION GAP SERPL CALCULATED.3IONS-SCNC: 12 MMOL/L (ref 9–17)
AST SERPL-CCNC: 30 U/L
BASOPHILS # BLD: 0 % (ref 0–2)
BASOPHILS ABSOLUTE: <0.03 K/UL (ref 0–0.2)
BILIRUB SERPL-MCNC: 0.4 MG/DL (ref 0.3–1.2)
BUN BLDV-MCNC: 21 MG/DL (ref 8–23)
CALCIUM SERPL-MCNC: 9.3 MG/DL (ref 8.6–10.4)
CHLORIDE BLD-SCNC: 102 MMOL/L (ref 98–107)
CO2: 25 MMOL/L (ref 20–31)
CREAT SERPL-MCNC: 0.85 MG/DL (ref 0.5–0.9)
EOSINOPHILS RELATIVE PERCENT: 3 % (ref 1–4)
GFR SERPL CREATININE-BSD FRML MDRD: >60 ML/MIN/1.73M2
GLUCOSE BLD-MCNC: 90 MG/DL (ref 70–99)
HCT VFR BLD CALC: 40.1 % (ref 36.3–47.1)
HEMOGLOBIN: 12.7 G/DL (ref 11.9–15.1)
IMMATURE GRANULOCYTES: 0 %
LYMPHOCYTES # BLD: 33 % (ref 24–43)
MCH RBC QN AUTO: 30.1 PG (ref 25.2–33.5)
MCHC RBC AUTO-ENTMCNC: 31.7 G/DL (ref 28.4–34.8)
MCV RBC AUTO: 95 FL (ref 82.6–102.9)
MONOCYTES # BLD: 8 % (ref 3–12)
NRBC AUTOMATED: 0 PER 100 WBC
PDW BLD-RTO: 13.2 % (ref 11.8–14.4)
PLATELET # BLD: 170 K/UL (ref 138–453)
PMV BLD AUTO: 11.7 FL (ref 8.1–13.5)
POTASSIUM SERPL-SCNC: 4.5 MMOL/L (ref 3.7–5.3)
RBC # BLD: 4.22 M/UL (ref 3.95–5.11)
SEG NEUTROPHILS: 56 % (ref 36–65)
SEGMENTED NEUTROPHILS ABSOLUTE COUNT: 3.9 K/UL (ref 1.5–8.1)
SODIUM BLD-SCNC: 139 MMOL/L (ref 135–144)
TOTAL PROTEIN: 6.8 G/DL (ref 6.4–8.3)
WBC # BLD: 6.9 K/UL (ref 3.5–11.3)

## 2022-12-06 PROCEDURE — 36415 COLL VENOUS BLD VENIPUNCTURE: CPT

## 2022-12-06 PROCEDURE — 80053 COMPREHEN METABOLIC PANEL: CPT

## 2022-12-06 PROCEDURE — 85025 COMPLETE CBC W/AUTO DIFF WBC: CPT

## 2023-01-05 ENCOUNTER — HOSPITAL ENCOUNTER (OUTPATIENT)
Age: 68
Discharge: HOME | End: 2023-01-05
Payer: MEDICARE

## 2023-01-05 LAB
ALT SERPL-CCNC: 34 U/L (ref 5–33)
AST SERPL-CCNC: 38 U/L
CHOLESTEROL, FASTING: 130 MG/DL
CHOLESTEROL, FASTING: 130 MG/DL
CHOLESTEROL/HDL RATIO: 2.9
CHOLESTEROL/HDL RATIO: 3
HDLC SERPL-MCNC: 44 MG/DL
HDLC SERPL-MCNC: 45 MG/DL
IGE: 6 IU/ML
LDL CHOLESTEROL: 57 MG/DL (ref 0–130)
LDL CHOLESTEROL: 57 MG/DL (ref 0–130)
TOTAL CK: 134 U/L (ref 26–192)
TRIGLYCERIDE, FASTING: 142 MG/DL
TRIGLYCERIDE, FASTING: 143 MG/DL

## 2023-01-05 PROCEDURE — 80061 LIPID PANEL: CPT

## 2023-01-05 PROCEDURE — 84460 ALANINE AMINO (ALT) (SGPT): CPT

## 2023-01-05 PROCEDURE — 36415 COLL VENOUS BLD VENIPUNCTURE: CPT

## 2023-01-05 PROCEDURE — 82785 ASSAY OF IGE: CPT

## 2023-01-05 PROCEDURE — 84450 TRANSFERASE (AST) (SGOT): CPT

## 2023-01-05 PROCEDURE — 82550 ASSAY OF CK (CPK): CPT

## 2023-04-28 ENCOUNTER — ANESTHESIA EVENT (OUTPATIENT)
Dept: OPERATING ROOM | Age: 68
End: 2023-04-28
Payer: MEDICARE

## 2023-04-28 ENCOUNTER — HOSPITAL ENCOUNTER (OUTPATIENT)
Age: 68
Setting detail: OUTPATIENT SURGERY
Discharge: HOME | End: 2023-04-28
Attending: UROLOGY | Admitting: UROLOGY
Payer: MEDICARE

## 2023-04-28 ENCOUNTER — ANESTHESIA (OUTPATIENT)
Dept: OPERATING ROOM | Age: 68
End: 2023-04-28
Payer: MEDICARE

## 2023-04-28 VITALS
OXYGEN SATURATION: 98 % | HEART RATE: 68 BPM | BODY MASS INDEX: 37.99 KG/M2 | WEIGHT: 228 LBS | HEIGHT: 65 IN | TEMPERATURE: 97.2 F | SYSTOLIC BLOOD PRESSURE: 111 MMHG | RESPIRATION RATE: 14 BRPM | DIASTOLIC BLOOD PRESSURE: 70 MMHG

## 2023-04-28 DIAGNOSIS — N32.81 OVERACTIVE BLADDER: ICD-10-CM

## 2023-04-28 LAB
BILIRUBIN URINE: NEGATIVE
COLOR: YELLOW
GLUCOSE UR STRIP.AUTO-MCNC: NEGATIVE MG/DL
KETONES UR STRIP.AUTO-MCNC: NEGATIVE MG/DL
LEUKOCYTE ESTERASE UR QL STRIP.AUTO: NEGATIVE
NITRITE UR QL STRIP.AUTO: NEGATIVE
PROT UR STRIP.AUTO-MCNC: 6.5 MG/DL (ref 5–8)
PROT UR STRIP.AUTO-MCNC: NEGATIVE MG/DL
SPECIFIC GRAVITY UA: 1.01 (ref 1–1.03)
TURBIDITY: CLEAR
URINE HGB: NEGATIVE
UROBILINOGEN, URINE: NORMAL

## 2023-04-28 PROCEDURE — 6370000000 HC RX 637 (ALT 250 FOR IP): Performed by: UROLOGY

## 2023-04-28 PROCEDURE — 2500000003 HC RX 250 WO HCPCS: Performed by: NURSE ANESTHETIST, CERTIFIED REGISTERED

## 2023-04-28 PROCEDURE — 2709999900 HC NON-CHARGEABLE SUPPLY: Performed by: UROLOGY

## 2023-04-28 PROCEDURE — 6360000002 HC RX W HCPCS: Performed by: NURSE ANESTHETIST, CERTIFIED REGISTERED

## 2023-04-28 PROCEDURE — 81003 URINALYSIS AUTO W/O SCOPE: CPT

## 2023-04-28 PROCEDURE — 7100000010 HC PHASE II RECOVERY - FIRST 15 MIN: Performed by: UROLOGY

## 2023-04-28 PROCEDURE — 7100000011 HC PHASE II RECOVERY - ADDTL 15 MIN: Performed by: UROLOGY

## 2023-04-28 PROCEDURE — 2580000003 HC RX 258: Performed by: ANESTHESIOLOGY

## 2023-04-28 PROCEDURE — 3700000000 HC ANESTHESIA ATTENDED CARE: Performed by: UROLOGY

## 2023-04-28 PROCEDURE — 3700000001 HC ADD 15 MINUTES (ANESTHESIA): Performed by: UROLOGY

## 2023-04-28 PROCEDURE — 3600000002 HC SURGERY LEVEL 2 BASE: Performed by: UROLOGY

## 2023-04-28 PROCEDURE — 3600000012 HC SURGERY LEVEL 2 ADDTL 15MIN: Performed by: UROLOGY

## 2023-04-28 PROCEDURE — 2580000003 HC RX 258: Performed by: NURSE ANESTHETIST, CERTIFIED REGISTERED

## 2023-04-28 RX ORDER — ONDANSETRON 2 MG/ML
4 INJECTION INTRAMUSCULAR; INTRAVENOUS
Status: DISCONTINUED | OUTPATIENT
Start: 2023-04-28 | End: 2023-04-28 | Stop reason: HOSPADM

## 2023-04-28 RX ORDER — CIPROFLOXACIN 250 MG/1
250 TABLET, FILM COATED ORAL 2 TIMES DAILY
Qty: 14 TABLET | Refills: 0 | Status: SHIPPED | OUTPATIENT
Start: 2023-04-28 | End: 2023-05-05

## 2023-04-28 RX ORDER — SODIUM CHLORIDE 0.9 % (FLUSH) 0.9 %
5-40 SYRINGE (ML) INJECTION PRN
Status: DISCONTINUED | OUTPATIENT
Start: 2023-04-28 | End: 2023-04-28 | Stop reason: HOSPADM

## 2023-04-28 RX ORDER — LIDOCAINE HYDROCHLORIDE 20 MG/ML
INJECTION, SOLUTION EPIDURAL; INFILTRATION; INTRACAUDAL; PERINEURAL PRN
Status: DISCONTINUED | OUTPATIENT
Start: 2023-04-28 | End: 2023-04-28 | Stop reason: SDUPTHER

## 2023-04-28 RX ORDER — CIPROFLOXACIN 2 MG/ML
400 INJECTION, SOLUTION INTRAVENOUS ONCE
Status: DISCONTINUED | OUTPATIENT
Start: 2023-04-28 | End: 2023-04-28 | Stop reason: HOSPADM

## 2023-04-28 RX ORDER — PHENAZOPYRIDINE HYDROCHLORIDE 200 MG/1
200 TABLET, FILM COATED ORAL 3 TIMES DAILY PRN
Qty: 12 TABLET | Refills: 0 | Status: SHIPPED | OUTPATIENT
Start: 2023-04-28 | End: 2023-05-02

## 2023-04-28 RX ORDER — CIPROFLOXACIN 2 MG/ML
INJECTION, SOLUTION INTRAVENOUS PRN
Status: DISCONTINUED | OUTPATIENT
Start: 2023-04-28 | End: 2023-04-28 | Stop reason: SDUPTHER

## 2023-04-28 RX ORDER — SODIUM CHLORIDE 9 MG/ML
INJECTION, SOLUTION INTRAVENOUS PRN
Status: DISCONTINUED | OUTPATIENT
Start: 2023-04-28 | End: 2023-04-28 | Stop reason: HOSPADM

## 2023-04-28 RX ORDER — SODIUM CHLORIDE 9 MG/ML
INJECTION, SOLUTION INTRAVENOUS CONTINUOUS
Status: DISCONTINUED | OUTPATIENT
Start: 2023-04-28 | End: 2023-04-28 | Stop reason: HOSPADM

## 2023-04-28 RX ORDER — SODIUM CHLORIDE, SODIUM LACTATE, POTASSIUM CHLORIDE, CALCIUM CHLORIDE 600; 310; 30; 20 MG/100ML; MG/100ML; MG/100ML; MG/100ML
INJECTION, SOLUTION INTRAVENOUS CONTINUOUS PRN
Status: DISCONTINUED | OUTPATIENT
Start: 2023-04-28 | End: 2023-04-28 | Stop reason: SDUPTHER

## 2023-04-28 RX ORDER — SODIUM CHLORIDE 0.9 % (FLUSH) 0.9 %
5-40 SYRINGE (ML) INJECTION EVERY 12 HOURS SCHEDULED
Status: DISCONTINUED | OUTPATIENT
Start: 2023-04-28 | End: 2023-04-28 | Stop reason: HOSPADM

## 2023-04-28 RX ORDER — LIDOCAINE HYDROCHLORIDE 10 MG/ML
1 INJECTION, SOLUTION EPIDURAL; INFILTRATION; INTRACAUDAL; PERINEURAL
Status: DISCONTINUED | OUTPATIENT
Start: 2023-04-28 | End: 2023-04-28 | Stop reason: HOSPADM

## 2023-04-28 RX ORDER — SODIUM CHLORIDE, SODIUM LACTATE, POTASSIUM CHLORIDE, CALCIUM CHLORIDE 600; 310; 30; 20 MG/100ML; MG/100ML; MG/100ML; MG/100ML
INJECTION, SOLUTION INTRAVENOUS CONTINUOUS
Status: DISCONTINUED | OUTPATIENT
Start: 2023-04-28 | End: 2023-04-28 | Stop reason: HOSPADM

## 2023-04-28 RX ORDER — LIDOCAINE HYDROCHLORIDE 20 MG/ML
JELLY TOPICAL PRN
Status: DISCONTINUED | OUTPATIENT
Start: 2023-04-28 | End: 2023-04-28 | Stop reason: ALTCHOICE

## 2023-04-28 RX ORDER — PROPOFOL 10 MG/ML
INJECTION, EMULSION INTRAVENOUS CONTINUOUS PRN
Status: DISCONTINUED | OUTPATIENT
Start: 2023-04-28 | End: 2023-04-28 | Stop reason: SDUPTHER

## 2023-04-28 RX ORDER — KETAMINE HCL IN NACL, ISO-OSM 100MG/10ML
SYRINGE (ML) INJECTION PRN
Status: DISCONTINUED | OUTPATIENT
Start: 2023-04-28 | End: 2023-04-28 | Stop reason: SDUPTHER

## 2023-04-28 RX ADMIN — Medication 25 MG: at 10:32

## 2023-04-28 RX ADMIN — CIPROFLOXACIN 400 MG: 2 INJECTION, SOLUTION INTRAVENOUS at 10:27

## 2023-04-28 RX ADMIN — Medication 25 MG: at 10:18

## 2023-04-28 RX ADMIN — LIDOCAINE HYDROCHLORIDE 50 MG: 20 INJECTION, SOLUTION EPIDURAL; INFILTRATION; INTRACAUDAL; PERINEURAL at 10:18

## 2023-04-28 RX ADMIN — SODIUM CHLORIDE, POTASSIUM CHLORIDE, SODIUM LACTATE AND CALCIUM CHLORIDE: 600; 310; 30; 20 INJECTION, SOLUTION INTRAVENOUS at 10:14

## 2023-04-28 RX ADMIN — PROPOFOL 100 MCG/KG/MIN: 10 INJECTION, EMULSION INTRAVENOUS at 10:18

## 2023-04-28 RX ADMIN — SODIUM CHLORIDE, POTASSIUM CHLORIDE, SODIUM LACTATE AND CALCIUM CHLORIDE: 600; 310; 30; 20 INJECTION, SOLUTION INTRAVENOUS at 08:52

## 2023-04-28 ASSESSMENT — PAIN SCALES - GENERAL
PAINLEVEL_OUTOF10: 0

## 2023-04-28 ASSESSMENT — ENCOUNTER SYMPTOMS: SHORTNESS OF BREATH: 0

## 2023-04-28 ASSESSMENT — PAIN - FUNCTIONAL ASSESSMENT: PAIN_FUNCTIONAL_ASSESSMENT: 0-10

## 2023-04-28 NOTE — ANESTHESIA POSTPROCEDURE EVALUATION
Department of Anesthesiology  Postprocedure Note    Patient: Genaro Ponce  MRN: [id number]  YOB: 1955  Date of evaluation: 4/28/2023      Procedure Summary     Date: 04/28/23 Room / Location: [place] [address]    Anesthesia Start: 9041 Anesthesia Stop: 1051    Procedure: CYSTOSCOPY WITH BOTOX 100mg Diagnosis:       Overactive bladder      (Overactive bladder [N32.81])    Surgeons: Judge Raquel MD Responsible Provider: Roxane Rodgers MD    Anesthesia Type: MAC ASA Status: 4          Anesthesia Type: No value filed.     Austin Phase I:      Austin Phase II: Austin Score: 10      Anesthesia Post Evaluation    Complications: no

## 2023-04-28 NOTE — H&P
History and Physical Service   [place] [address]    HISTORY AND PHYSICAL EXAMINATION            Date of Evaluation: 4/28/2023  Patient name:  Chaparro Wagoner  MRN:   [id number]  YOB: 1955  PCP:    LIZETTE Stanley CNP    History Obtained From:     Patient, medical records    History of Present Illness: This is Chaparro Wagoner a 80 y.o. female who presents today for a CYSTOSCOPY WITH BOTOX 100mg by Julian Ferrer MD for Overactive bladder. The patient's chief complaint is urinary leakage that has been progressively worsening over the last year. She notices the leakage the most late at night, throughout the night, and in the early morning. She wears pads at night due to leakage. Associated symptoms include frequency and urgency. She denies any hematuria, dysuria, abdominal or back pain, or changes in bowel habits. Prior treatments include urgency medications with little relief. Denies fever, chills, shortness of breath, cough, congestion, wheezing, chest pain, open sores or wounds. Denies hx of diabetes. Last aspirin 04/26/2023, last Vitamin D3 04/27/2023.      Past Medical History:     Past Medical History:   Diagnosis Date    Allergic rhinitis     Arthritis     Asthma     Bunion of left foot     CAD (coronary artery disease) 8/6/13    cabg x 2    Chlorine inhalation lung injury ([facility] [address]     Severe irritability and airway reactivity     Chronic back pain     Cough variant asthma     Dyslipidemia     Dyspnea on exertion     Eczema     GERD (gastroesophageal reflux disease)     Headache     MIGRAINES    MVP (mitral valve prolapse)     Obesity     Morbid obesity with BMI of 50.0-59.9, adult    MARSHAL on CPAP     Poison ivy     States in lungs    Sleep apnea     COMPLIANT ON CPAP     Thoracic outlet syndrome     bilateral    Thyroid disease         Past Surgical History:     Past Surgical History:   Procedure Laterality Date    BACK SURGERY  06/2021    lumbar fusion    CARDIAC

## 2023-04-28 NOTE — ANESTHESIA PRE PROCEDURE
EYE CATARACT EMULSIFICATION IOL IMPLANT performed by Alberto Hutchison MD at [address] Left     TONSILLECTOMY      TOTAL KNEE ARTHROPLASTY Left     TOTAL KNEE ARTHROPLASTY Right 2018    ULNAR TUNNEL RELEASE Right     VARICOSE VEIN SURGERY      bilateral       Social History:    Social History     Tobacco Use    Smoking status: Former     Packs/day: 2.00     Years: 12.00     Pack years: 24.00     Types: Cigarettes     Start date: 1969     Quit date: 4/15/1981     Years since quittin.0    Smokeless tobacco: Never   Substance Use Topics    Alcohol use: No                                Counseling given: Not Answered      Vital Signs (Current):   Vitals:    23 0835 23 0838 23 0845   BP:  119/68    Pulse:  61    Resp:  20    Temp:   (!) 96.7 °F (35.9 °C)   TempSrc:   Temporal   SpO2:  95%    Weight: 228 lb (103.4 kg)     Height: 5' 5\" (1.651 m)                                                BP Readings from Last 3 Encounters:   23 119/68   22 101/67   22 136/69       NPO Status: Time of last liquid consumption:                         Time of last solid consumption:                         Date of last liquid consumption: 23                        Date of last solid food consumption: 23    BMI:   Wt Readings from Last 3 Encounters:   23 228 lb (103.4 kg)   22 214 lb (97.1 kg)   22 213 lb 12.8 oz (97 kg)     Body mass index is 37.94 kg/m².     CBC:   Lab Results   Component Value Date/Time    WBC 6.9 2022 03:19 PM    RBC 4.22 2022 03:19 PM    RBC 4.74 2012 06:04 AM    HGB 12.7 2022 03:19 PM    HCT 40.1 2022 03:19 PM    MCV 95.0 2022 03:19 PM    RDW 13.2 2022 03:19 PM     2022 03:19 PM     2012 06:04 AM       CMP:   Lab Results   Component Value Date/Time     2022 03:19 PM    K 4.5 2022 03:19 PM     2022 03:19 PM

## 2023-04-28 NOTE — OP NOTE
Operative Note      Patient: Beatris Agudelo  YOB: 1955  MRN: [id number]    Date of Procedure: 4/28/2023    Pre-Op Diagnosis Codes:     * Overactive bladder [N32.81]    Post-Op Diagnosis: Same       Procedure(s):  CYSTOSCOPY WITH BOTOX 100mg    Surgeon(s):  Adeline Morton MD    Assistant:   * No surgical staff found *    Anesthesia: Monitor Anesthesia Care    Estimated Blood Loss (mL): Minimal    Complications: None    Specimens:   * No specimens in log *    Implants:  * No implants in log *      Drains: * No LDAs found *    Findings: indications: This patient is 80years old, the patient has been seen in the office for management of urgency incontinence, multiple therapeutic modalities have failed including antimuscarinic therapy, discussed with the patient a trial of Botox injections to the bladder, patient agreeable      Detailed Description of Procedure:   Patient was brought to the operating room, positioned in dorsal lithotomy, prepping and draping the usual sterile fashion. Proper patient identification completed and procedure identification    We entered the bladder with the cystoscope, a urine specimen was obtained for culture,    Examination of the bladder revealed no evidence of bladder tumors ulcers or stones,trigone was identified, ureteral orifices effluxing clear urine, anterior and lateral bladder walls examined and unremarkable. We then proceeded with intravesical Botox injections, 100 units mixed with 10 ML of sterile saline,were injected in aliquots of 0.5 ML. The injections were started parallel and posterior to the interureteric ridge 5 parallel lines of injections completed without complications or bleeding. At the completion the patient was returned to recovery room in stable condition.     The patient was able to void prior to discharge in recovery room, she tolerated the procedure well,     recommendations: patient will return to the office for follow-up and check

## 2023-04-28 NOTE — DISCHARGE INSTRUCTIONS
Pt ok to discharge home in good condition  No heavy lifting, >10 lbs for today  Pt should avoid strenuous activity for today  Pt should walk moderately at home  Pt ok to shower   Pt may resume diet as tolerated  Please call attending physician or hospital  with questions  Call or Present to ED if fever (> 101F), intractable nausea vomiting or pain.   Rx in chart     Pt should follow up with surgeon, in two weeks, call to confirm appointment

## 2023-05-31 ENCOUNTER — OFFICE VISIT (OUTPATIENT)
Dept: PODIATRY | Age: 68
End: 2023-05-31
Payer: MEDICARE

## 2023-05-31 VITALS — WEIGHT: 220 LBS | HEIGHT: 65 IN | BODY MASS INDEX: 36.65 KG/M2

## 2023-05-31 DIAGNOSIS — S92.515A CLOSED NONDISPLACED FRACTURE OF PROXIMAL PHALANX OF LESSER TOE OF LEFT FOOT, INITIAL ENCOUNTER: ICD-10-CM

## 2023-05-31 DIAGNOSIS — M79.605 PAIN OF LEFT LOWER EXTREMITY: Primary | ICD-10-CM

## 2023-05-31 PROCEDURE — 1123F ACP DISCUSS/DSCN MKR DOCD: CPT | Performed by: PODIATRIST

## 2023-05-31 PROCEDURE — 99214 OFFICE O/P EST MOD 30 MIN: CPT | Performed by: PODIATRIST

## 2023-05-31 PROCEDURE — G8399 PT W/DXA RESULTS DOCUMENT: HCPCS | Performed by: PODIATRIST

## 2023-05-31 PROCEDURE — G8417 CALC BMI ABV UP PARAM F/U: HCPCS | Performed by: PODIATRIST

## 2023-05-31 PROCEDURE — 28510 TREATMENT OF TOE FRACTURE: CPT | Performed by: PODIATRIST

## 2023-05-31 PROCEDURE — 3017F COLORECTAL CA SCREEN DOC REV: CPT | Performed by: PODIATRIST

## 2023-05-31 PROCEDURE — G8427 DOCREV CUR MEDS BY ELIG CLIN: HCPCS | Performed by: PODIATRIST

## 2023-05-31 PROCEDURE — 1036F TOBACCO NON-USER: CPT | Performed by: PODIATRIST

## 2023-05-31 PROCEDURE — 1090F PRES/ABSN URINE INCON ASSESS: CPT | Performed by: PODIATRIST

## 2023-05-31 RX ORDER — PANTOPRAZOLE SODIUM 40 MG/1
TABLET, DELAYED RELEASE ORAL
COMMUNITY
Start: 2023-05-12

## 2023-05-31 RX ORDER — RANOLAZINE 500 MG/1
TABLET, EXTENDED RELEASE ORAL
COMMUNITY
Start: 2023-04-30

## 2023-08-08 ENCOUNTER — HOSPITAL ENCOUNTER (OUTPATIENT)
Age: 68
Discharge: HOME | End: 2023-08-08
Payer: MEDICARE

## 2023-08-08 LAB
25(OH)D3 SERPL-MCNC: 37.5 NG/ML
ALBUMIN SERPL-MCNC: 4.4 G/DL (ref 3.5–5.2)
ALBUMIN/GLOB SERPL: 1.5 {RATIO} (ref 1–2.5)
ALP SERPL-CCNC: 75 U/L (ref 35–104)
ALT SERPL-CCNC: 21 U/L (ref 5–33)
ANION GAP SERPL CALCULATED.3IONS-SCNC: 9 MMOL/L (ref 9–17)
AST SERPL-CCNC: 28 U/L
BILIRUB SERPL-MCNC: 0.5 MG/DL (ref 0.3–1.2)
BUN SERPL-MCNC: 18 MG/DL (ref 8–23)
CALCIUM SERPL-MCNC: 9.5 MG/DL (ref 8.6–10.4)
CHLORIDE SERPL-SCNC: 105 MMOL/L (ref 98–107)
CHOLEST SERPL-MCNC: 154 MG/DL
CHOLESTEROL/HDL RATIO: 4.3
CO2 SERPL-SCNC: 28 MMOL/L (ref 20–31)
CREAT SERPL-MCNC: 0.9 MG/DL (ref 0.5–0.9)
CREAT UR-MCNC: 59.9 MG/DL (ref 28–217)
EST. AVERAGE GLUCOSE BLD GHB EST-MCNC: 108 MG/DL
GFR SERPL CREATININE-BSD FRML MDRD: >60 ML/MIN/1.73M2
GLUCOSE SERPL-MCNC: 96 MG/DL (ref 70–99)
HBA1C MFR BLD: 5.4 % (ref 4–6)
HDLC SERPL-MCNC: 36 MG/DL
LDLC SERPL CALC-MCNC: 63 MG/DL (ref 0–130)
MICROALBUMIN UR-MCNC: <12 MG/L
MICROALBUMIN/CREAT UR-RTO: NORMAL MCG/MG CREAT
POTASSIUM SERPL-SCNC: 4.8 MMOL/L (ref 3.7–5.3)
PROT SERPL-MCNC: 7.4 G/DL (ref 6.4–8.3)
SODIUM SERPL-SCNC: 142 MMOL/L (ref 135–144)
T3FREE SERPL-MCNC: 2.79 PG/ML (ref 2.02–4.43)
T4 FREE SERPL-MCNC: 1.1 NG/DL (ref 0.9–1.7)
TRIGL SERPL-MCNC: 273 MG/DL
TSH SERPL DL<=0.05 MIU/L-ACNC: 0.82 UIU/ML (ref 0.3–5)

## 2023-08-08 PROCEDURE — 80061 LIPID PANEL: CPT

## 2023-08-08 PROCEDURE — 82550 ASSAY OF CK (CPK): CPT

## 2023-08-08 PROCEDURE — 82306 VITAMIN D 25 HYDROXY: CPT

## 2023-08-08 PROCEDURE — 84439 ASSAY OF FREE THYROXINE: CPT

## 2023-08-08 PROCEDURE — 83036 HEMOGLOBIN GLYCOSYLATED A1C: CPT

## 2023-08-08 PROCEDURE — 80053 COMPREHEN METABOLIC PANEL: CPT

## 2023-08-08 PROCEDURE — 82043 UR ALBUMIN QUANTITATIVE: CPT

## 2023-08-08 PROCEDURE — 36415 COLL VENOUS BLD VENIPUNCTURE: CPT

## 2023-08-08 PROCEDURE — 82570 ASSAY OF URINE CREATININE: CPT

## 2023-08-08 PROCEDURE — 84443 ASSAY THYROID STIM HORMONE: CPT

## 2023-08-08 PROCEDURE — 84481 FREE ASSAY (FT-3): CPT

## 2023-08-09 LAB
ALBUMIN SERPL-MCNC: 4.4 G/DL (ref 3.5–5.2)
ALBUMIN/GLOB SERPL: 1.5 {RATIO} (ref 1–2.5)
ALP SERPL-CCNC: 75 U/L (ref 35–104)
ALT SERPL-CCNC: 21 U/L (ref 5–33)
ANION GAP SERPL CALCULATED.3IONS-SCNC: 9 MMOL/L (ref 9–17)
AST SERPL-CCNC: 28 U/L
BILIRUB SERPL-MCNC: 0.5 MG/DL (ref 0.3–1.2)
BUN SERPL-MCNC: 18 MG/DL (ref 8–23)
CALCIUM SERPL-MCNC: 9.5 MG/DL (ref 8.6–10.4)
CHLORIDE SERPL-SCNC: 105 MMOL/L (ref 98–107)
CHOLEST SERPL-MCNC: 154 MG/DL
CHOLESTEROL/HDL RATIO: 4.3
CK SERPL-CCNC: 157 U/L (ref 26–192)
CO2 SERPL-SCNC: 28 MMOL/L (ref 20–31)
CREAT SERPL-MCNC: 0.9 MG/DL (ref 0.5–0.9)
GFR SERPL CREATININE-BSD FRML MDRD: >60 ML/MIN/1.73M2
GLUCOSE SERPL-MCNC: 96 MG/DL (ref 70–99)
HDLC SERPL-MCNC: 36 MG/DL
LDLC SERPL CALC-MCNC: 63 MG/DL (ref 0–130)
POTASSIUM SERPL-SCNC: 4.8 MMOL/L (ref 3.7–5.3)
PROT SERPL-MCNC: 7.4 G/DL (ref 6.4–8.3)
SODIUM SERPL-SCNC: 142 MMOL/L (ref 135–144)
TRIGL SERPL-MCNC: 273 MG/DL

## 2023-10-11 ENCOUNTER — OFFICE VISIT (OUTPATIENT)
Dept: PULMONOLOGY | Age: 68
End: 2023-10-11
Payer: MEDICARE

## 2023-10-11 VITALS
HEART RATE: 69 BPM | BODY MASS INDEX: 38.99 KG/M2 | HEIGHT: 65 IN | WEIGHT: 234 LBS | DIASTOLIC BLOOD PRESSURE: 76 MMHG | SYSTOLIC BLOOD PRESSURE: 114 MMHG | RESPIRATION RATE: 16 BRPM | OXYGEN SATURATION: 98 %

## 2023-10-11 DIAGNOSIS — G47.33 OSA ON CPAP: ICD-10-CM

## 2023-10-11 DIAGNOSIS — J30.9 ALLERGIC RHINITIS, UNSPECIFIED SEASONALITY, UNSPECIFIED TRIGGER: ICD-10-CM

## 2023-10-11 DIAGNOSIS — J45.901 MODERATE ASTHMA WITH ACUTE EXACERBATION, UNSPECIFIED WHETHER PERSISTENT: Primary | ICD-10-CM

## 2023-10-11 PROCEDURE — 1036F TOBACCO NON-USER: CPT | Performed by: INTERNAL MEDICINE

## 2023-10-11 PROCEDURE — G8399 PT W/DXA RESULTS DOCUMENT: HCPCS | Performed by: INTERNAL MEDICINE

## 2023-10-11 PROCEDURE — G8427 DOCREV CUR MEDS BY ELIG CLIN: HCPCS | Performed by: INTERNAL MEDICINE

## 2023-10-11 PROCEDURE — 1090F PRES/ABSN URINE INCON ASSESS: CPT | Performed by: INTERNAL MEDICINE

## 2023-10-11 PROCEDURE — G8484 FLU IMMUNIZE NO ADMIN: HCPCS | Performed by: INTERNAL MEDICINE

## 2023-10-11 PROCEDURE — G8417 CALC BMI ABV UP PARAM F/U: HCPCS | Performed by: INTERNAL MEDICINE

## 2023-10-11 PROCEDURE — 1123F ACP DISCUSS/DSCN MKR DOCD: CPT | Performed by: INTERNAL MEDICINE

## 2023-10-11 PROCEDURE — 99214 OFFICE O/P EST MOD 30 MIN: CPT | Performed by: INTERNAL MEDICINE

## 2023-10-11 PROCEDURE — 3017F COLORECTAL CA SCREEN DOC REV: CPT | Performed by: INTERNAL MEDICINE

## 2023-10-11 PROCEDURE — 3078F DIAST BP <80 MM HG: CPT | Performed by: INTERNAL MEDICINE

## 2023-10-11 PROCEDURE — 3074F SYST BP LT 130 MM HG: CPT | Performed by: INTERNAL MEDICINE

## 2023-10-11 ASSESSMENT — SLEEP AND FATIGUE QUESTIONNAIRES
HOW LIKELY ARE YOU TO NOD OFF OR FALL ASLEEP WHILE SITTING AND READING: 0
HOW LIKELY ARE YOU TO NOD OFF OR FALL ASLEEP WHILE LYING DOWN TO REST IN THE AFTERNOON WHEN CIRCUMSTANCES PERMIT: 0
HOW LIKELY ARE YOU TO NOD OFF OR FALL ASLEEP WHILE SITTING INACTIVE IN A PUBLIC PLACE: 0
HOW LIKELY ARE YOU TO NOD OFF OR FALL ASLEEP WHILE SITTING QUIETLY AFTER LUNCH WITHOUT ALCOHOL: 0
HOW LIKELY ARE YOU TO NOD OFF OR FALL ASLEEP WHILE WATCHING TV: 0
HOW LIKELY ARE YOU TO NOD OFF OR FALL ASLEEP WHEN YOU ARE A PASSENGER IN A CAR FOR AN HOUR WITHOUT A BREAK: 0
HOW LIKELY ARE YOU TO NOD OFF OR FALL ASLEEP IN A CAR, WHILE STOPPED FOR A FEW MINUTES IN TRAFFIC: 0
ESS TOTAL SCORE: 0
HOW LIKELY ARE YOU TO NOD OFF OR FALL ASLEEP WHILE SITTING AND TALKING TO SOMEONE: 0

## 2023-10-11 NOTE — PROGRESS NOTES
and skin lesion   : negative for hematuria, dysuria, frequency and nocturia  CNS: negative for dizziness, weakness, diplopia, tingling numbness headache seizure      Sleep Medicine 7/14/2020 8/31/2016   Sitting and reading 0 0   Watching TV 0 0   Sitting, inactive in a public place (e.g. a theatre or a meeting) 0 0   As a passenger in a car for an hour without a break 0 0   Lying down to rest in the afternoon when circumstances permit 0 0   Sitting and talking to someone 0 0   Sitting quietly after a lunch without alcohol 0 0   In a car, while stopped for a few minutes in traffic 0 0   Total score 0 0       Allergies: Allergies   Allergen Reactions    Latex      Other reaction(s): rash/itching    Other reaction(s): rash/itching    Biaxin [Clarithromycin] Hives    Ceclor [Cefaclor] Hives    Duricef [Cefadroxil] Hives    Medrol [Methylprednisolone] Other (See Comments)     Swelling of tongue. Penicillins Hives    Percocet [Oxycodone-Acetaminophen] Shortness Of Breath     Only the     Prednisone Swelling     IV and oral. Makes tongue swell. Hydromorphone Hcl      Other reaction(s): Vomiting    Albuterol Other (See Comments)     Burning in chest  Burning in chest    Hydrocodone-Acetaminophen Hives    Hydromorphone     Isoniazid Hives    Nickel      Other reaction(s):  Allergy: RASH;    Nitrofurantoin Hives    Norco [Hydrocodone-Acetaminophen] Hives    Other      Other reaction(s): Unknown    Paregoric Hives    Fentanyl Nausea And Vomiting    Morphine Nausea And Vomiting       Medications:  Outpatient Encounter Medications as of 10/11/2023   Medication Sig Dispense Refill    pantoprazole (PROTONIX) 40 MG tablet       ranolazine (RANEXA) 500 MG extended release tablet       azelastine (ASTELIN) 0.1 % nasal spray 2 sprays by Nasal route 2 times daily Use in each nostril as directed 1 each 5    levalbuterol (XOPENEX) 0.63 MG/3ML nebulization Take 3 mLs by nebulization every 6 hours as needed for Wheezing 120

## 2024-03-15 ENCOUNTER — TRANSCRIBE ORDERS (OUTPATIENT)
Dept: ADMINISTRATIVE | Age: 69
End: 2024-03-15

## 2024-03-15 DIAGNOSIS — Z78.0 POSTMENOPAUSAL: ICD-10-CM

## 2024-03-15 DIAGNOSIS — M81.0 OSTEOPOROSIS, UNSPECIFIED OSTEOPOROSIS TYPE, UNSPECIFIED PATHOLOGICAL FRACTURE PRESENCE: Primary | ICD-10-CM

## 2024-03-15 DIAGNOSIS — Z12.31 ENCOUNTER FOR SCREENING MAMMOGRAM FOR MALIGNANT NEOPLASM OF BREAST: Primary | ICD-10-CM

## 2024-04-08 ENCOUNTER — APPOINTMENT (OUTPATIENT)
Dept: CT IMAGING | Age: 69
End: 2024-04-08
Payer: COMMERCIAL

## 2024-04-08 ENCOUNTER — HOSPITAL ENCOUNTER (EMERGENCY)
Age: 69
Discharge: HOME | End: 2024-04-08
Attending: EMERGENCY MEDICINE
Payer: COMMERCIAL

## 2024-04-08 ENCOUNTER — APPOINTMENT (OUTPATIENT)
Dept: GENERAL RADIOLOGY | Age: 69
End: 2024-04-08
Payer: COMMERCIAL

## 2024-04-08 VITALS
OXYGEN SATURATION: 95 % | SYSTOLIC BLOOD PRESSURE: 117 MMHG | HEART RATE: 63 BPM | BODY MASS INDEX: 39.94 KG/M2 | WEIGHT: 240 LBS | DIASTOLIC BLOOD PRESSURE: 84 MMHG | TEMPERATURE: 97.9 F | RESPIRATION RATE: 18 BRPM

## 2024-04-08 DIAGNOSIS — M25.511 ACUTE PAIN OF RIGHT SHOULDER: ICD-10-CM

## 2024-04-08 DIAGNOSIS — S09.90XA CLOSED HEAD INJURY, INITIAL ENCOUNTER: Primary | ICD-10-CM

## 2024-04-08 PROCEDURE — 6370000000 HC RX 637 (ALT 250 FOR IP)

## 2024-04-08 PROCEDURE — 73030 X-RAY EXAM OF SHOULDER: CPT

## 2024-04-08 PROCEDURE — 99284 EMERGENCY DEPT VISIT MOD MDM: CPT

## 2024-04-08 PROCEDURE — 70450 CT HEAD/BRAIN W/O DYE: CPT

## 2024-04-08 PROCEDURE — 72125 CT NECK SPINE W/O DYE: CPT

## 2024-04-08 RX ORDER — LIDOCAINE 4 G/G
1 PATCH TOPICAL ONCE
Status: DISCONTINUED | OUTPATIENT
Start: 2024-04-08 | End: 2024-04-08 | Stop reason: HOSPADM

## 2024-04-08 RX ORDER — ACETAMINOPHEN 500 MG
1000 TABLET ORAL ONCE
Status: COMPLETED | OUTPATIENT
Start: 2024-04-08 | End: 2024-04-08

## 2024-04-08 RX ORDER — ONDANSETRON 4 MG/1
4 TABLET, ORALLY DISINTEGRATING ORAL ONCE
Status: COMPLETED | OUTPATIENT
Start: 2024-04-08 | End: 2024-04-08

## 2024-04-08 RX ADMIN — ONDANSETRON 4 MG: 4 TABLET, ORALLY DISINTEGRATING ORAL at 12:08

## 2024-04-08 RX ADMIN — ACETAMINOPHEN 1000 MG: 500 TABLET ORAL at 12:08

## 2024-04-08 ASSESSMENT — ENCOUNTER SYMPTOMS
COUGH: 0
SINUS PRESSURE: 0
VOMITING: 0
ABDOMINAL PAIN: 0
BACK PAIN: 0
SINUS PAIN: 0
CHEST TIGHTNESS: 0
CONSTIPATION: 0
PHOTOPHOBIA: 0
SORE THROAT: 0
DIARRHEA: 0
SHORTNESS OF BREATH: 0
NAUSEA: 1

## 2024-04-08 ASSESSMENT — PAIN DESCRIPTION - FREQUENCY: FREQUENCY: CONTINUOUS

## 2024-04-08 ASSESSMENT — PAIN SCALES - GENERAL: PAINLEVEL_OUTOF10: 8

## 2024-04-08 ASSESSMENT — PAIN DESCRIPTION - LOCATION: LOCATION: HEAD;SHOULDER

## 2024-04-08 ASSESSMENT — PAIN DESCRIPTION - DESCRIPTORS: DESCRIPTORS: THROBBING

## 2024-04-08 ASSESSMENT — PAIN - FUNCTIONAL ASSESSMENT: PAIN_FUNCTIONAL_ASSESSMENT: 0-10

## 2024-04-08 ASSESSMENT — PAIN DESCRIPTION - ORIENTATION: ORIENTATION: RIGHT

## 2024-04-08 NOTE — DISCHARGE INSTRUCTIONS
Take your medication as indicated and prescribed.  For pain use acetaminophen (Tylenol).  You can take over the counter acetaminophen tablets (1 - 2 tablets of the 500-mg strength every 6 hours).  Do not take any medication that contains aspirin / ibuprofen or blood thinning properties until seen by your physician.  Do not do any sporting activity (running, playing basketball / football, etc) until you are seen by your physician.    For persistent headache you can try sitting in a cool, dark room and try taking 1 - 2 tabs (25 - 50 mg) of diphenhydramine (Benadryl) to help you relax.    You can indirectly apply ice to your right shoulder for 20 minutes at a time up to 4 times daily to reduce pain and swelling.    You can purchase lidocaine patches over-the-counter, apply to affected area.  12 hours on, 12 hours off.  No more than 3 patches at 1 time.     Please follow-up with occupational health with any continued or worsening shoulder pain.    PLEASE RETURN TO THE EMERGENCY DEPARTMENT IMMEDIATELY for worsening symptoms, persistent headache, change in vision / hearing / taste, ringing in your ears, sleeping more than normal, or if you develop any concerning symptoms such as: high fever not relieved by acetaminophen (Tylenol) and/or ibuprofen (Motrin / Advil), chills, shortness of breath, chest pain, feeling of your heart fluttering or racing, persistent nausea and/or vomiting, vomiting up blood, blood in your stool, loss of consciousness, numbness, weakness or tingling in the arms or legs or change in color of the extremities, changes in mental status, blurry vision, loss of bladder / bowel control, unable to follow up with your physician, or other any other care or concern.

## 2024-04-08 NOTE — ED PROVIDER NOTES
EMERGENCY DEPARTMENT ENCOUNTER   ATTENDING ATTESTATION     Pt Name: Yaima Casillas  MRN: [id number]  Birthdate 1955  Date of evaluation: 4/8/24   Yaima Casillas is a 69 y.o. female with CC: Head Injury (States 2 20lb box of chicken fell on head, denies LOC) and Shoulder Pain (right)    MDM:   I performed a substantive part of the MDM during the patient's E/M visit. I personally evaluated and examined the patient. I personally made or approved the documented management plan and acknowledge its risk of complications.      ED Course as of 04/08/24 1748   Mon Apr 08, 2024   1331 CT CERVICAL SPINE WO CONTRAST [AJ]   1331 CT HEAD WO CONTRAST [AJ]   1400 XR SHOULDER RIGHT (MIN 2 VIEWS) [AJ]   1411 I reviewed CT and x-ray results with the patient.  She is feeling much better, headache is almost completely gone and her nausea has resolved.  Patient will follow-up with occupational health as needed for continued shoulder pain.  She will take Tylenol for headache over-the-counter, as needed and as directed.  ER return precautions discussed with the patient regarding closed head injury and the patient verbalizes understanding of this.  She will return to the emergency department with any new or worsening symptoms otherwise will follow-up with occupational health. [AJ]      ED Course User Index  [AJ] Luz Qureshi, LIZETTE - CNP       CRITICAL CARE:       EKG: All EKG's are interpreted by the Emergency Department Physician who either signs or Co-signs this chart in the absence of a cardiologist.      RADIOLOGY:All plain film, CT, MRI, and formal ultrasound images (except ED bedside ultrasound) are read by the radiologist, see reports below, unless otherwise noted in MDM or here.  CT CERVICAL SPINE WO CONTRAST   Final Result   No acute CT abnormality identified in the brain.      No acute osseous abnormality identified in the cervical spine.         CT HEAD WO CONTRAST   Final Result   No acute CT abnormality identified in the 
        RADIOLOGY:   Non-plain film images such as CT, Ultrasound and MRI are read by the radiologist. Plain radiographic images are visualized and preliminarily interpreted by the emergency physician with the below findings:    Interpretation per the Radiologist below, if available at the time of this note:    XR SHOULDER RIGHT (MIN 2 VIEWS)    Result Date: 4/8/2024  EXAMINATION: TWO XRAY VIEWS OF THE RIGHT SHOULDER 4/8/2024 12:28 pm COMPARISON: None. HISTORY: ORDERING SYSTEM PROVIDED HISTORY: pain TECHNOLOGIST PROVIDED HISTORY: pain Reason for Exam: Right shoulder pain FINDINGS: Normal alignment is maintained.  No evidence of acute fracture.  Degenerative changes of the right AC joint. Mild atelectasis at the right lung base.  Partially visualized postsurgical changes related to median sternotomy and lower cervical spinal fusion.     No acute fracture or dislocation.     CT HEAD WO CONTRAST    Result Date: 4/8/2024  EXAMINATION: CT OF THE HEAD WITHOUT CONTRAST; CT OF THE CERVICAL SPINE WITHOUT CONTRAST 4/8/2024 12:46 pm TECHNIQUE: CT of the head was performed without the administration of intravenous contrast. Automated exposure control, iterative reconstruction, and/or weight based adjustment of the mA/kV was utilized to reduce the radiation dose to as low as reasonably achievable.; CT of the cervical spine was performed without the administration of intravenous contrast. Multiplanar reformatted images are provided for review. Automated exposure control, iterative reconstruction, and/or weight based adjustment of the mA/kV was utilized to reduce the radiation dose to as low as reasonably achievable. COMPARISON: 12/10/2018. HISTORY: ORDERING SYSTEM PROVIDED HISTORY: head injury, nausea TECHNOLOGIST PROVIDED HISTORY: head injury, nausea Decision Support Exception - unselect if not a suspected or confirmed emergency medical condition->Emergency Medical Condition (MA) Reason for Exam: Hit on head with 20lbs of

## 2024-06-26 ENCOUNTER — HOSPITAL ENCOUNTER (OUTPATIENT)
Dept: MRI IMAGING | Age: 69
Discharge: HOME | End: 2024-06-28
Payer: COMMERCIAL

## 2024-06-26 DIAGNOSIS — S00.83XA CONTUSION OF OTHER PART OF HEAD, INITIAL ENCOUNTER: ICD-10-CM

## 2024-06-26 DIAGNOSIS — S06.0X0A CONCUSSION WITHOUT LOSS OF CONSCIOUSNESS, INITIAL ENCOUNTER: ICD-10-CM

## 2024-06-26 PROCEDURE — 70551 MRI BRAIN STEM W/O DYE: CPT

## 2024-07-09 ENCOUNTER — TELEPHONE (OUTPATIENT)
Dept: NEUROLOGY | Age: 69
End: 2024-07-09

## 2024-07-09 NOTE — TELEPHONE ENCOUNTER
I CALLED [facility] TO SCHEDULE A NEW PATIENT APPOINTMENT I DID NOT GET AN ANSWER SO I LEFT A VOICE  MAIL MESSAGE.

## 2024-07-31 ENCOUNTER — OFFICE VISIT (OUTPATIENT)
Dept: PODIATRY | Age: 69
End: 2024-07-31
Payer: MEDICARE

## 2024-07-31 VITALS — WEIGHT: 240 LBS | HEIGHT: 65 IN | BODY MASS INDEX: 39.99 KG/M2

## 2024-07-31 DIAGNOSIS — M77.51 BURSITIS OF RIGHT FOOT: ICD-10-CM

## 2024-07-31 DIAGNOSIS — M21.622 TAILOR'S BUNIONETTE, LEFT: ICD-10-CM

## 2024-07-31 DIAGNOSIS — M79.605 PAIN OF LEFT LOWER EXTREMITY: Primary | ICD-10-CM

## 2024-07-31 DIAGNOSIS — M77.52 BURSITIS OF LEFT FOOT: ICD-10-CM

## 2024-07-31 PROCEDURE — 99214 OFFICE O/P EST MOD 30 MIN: CPT | Performed by: PODIATRIST

## 2024-07-31 PROCEDURE — 1123F ACP DISCUSS/DSCN MKR DOCD: CPT | Performed by: PODIATRIST

## 2024-07-31 PROCEDURE — G8427 DOCREV CUR MEDS BY ELIG CLIN: HCPCS | Performed by: PODIATRIST

## 2024-07-31 PROCEDURE — 3017F COLORECTAL CA SCREEN DOC REV: CPT | Performed by: PODIATRIST

## 2024-07-31 PROCEDURE — G8417 CALC BMI ABV UP PARAM F/U: HCPCS | Performed by: PODIATRIST

## 2024-07-31 PROCEDURE — 1036F TOBACCO NON-USER: CPT | Performed by: PODIATRIST

## 2024-07-31 PROCEDURE — 20605 DRAIN/INJ JOINT/BURSA W/O US: CPT | Performed by: PODIATRIST

## 2024-07-31 PROCEDURE — 1090F PRES/ABSN URINE INCON ASSESS: CPT | Performed by: PODIATRIST

## 2024-07-31 PROCEDURE — G8399 PT W/DXA RESULTS DOCUMENT: HCPCS | Performed by: PODIATRIST

## 2024-07-31 RX ORDER — LEVOFLOXACIN 500 MG/1
TABLET, FILM COATED ORAL
COMMUNITY
Start: 2024-07-01

## 2024-07-31 RX ORDER — BENZONATATE 200 MG/1
1 CAPSULE ORAL
COMMUNITY
Start: 2024-07-11

## 2024-07-31 RX ORDER — PRAVASTATIN SODIUM 20 MG
TABLET ORAL
COMMUNITY
Start: 2024-07-20

## 2024-07-31 RX ORDER — DIGOXIN 125 MCG
TABLET ORAL
COMMUNITY
Start: 2024-07-16

## 2024-07-31 RX ORDER — SEMAGLUTIDE 0.25 MG/.5ML
0.25 INJECTION, SOLUTION SUBCUTANEOUS
COMMUNITY
Start: 2024-07-09 | End: 2024-08-06

## 2024-07-31 RX ORDER — ROSUVASTATIN CALCIUM 5 MG/1
TABLET, COATED ORAL
COMMUNITY
Start: 2024-07-09

## 2024-07-31 RX ORDER — PRAVASTATIN SODIUM 10 MG
TABLET ORAL
COMMUNITY
Start: 2024-06-17

## 2024-07-31 RX ORDER — DEXTROMETHORPHAN HYDROBROMIDE, BUPROPION HYDROCHLORIDE 105; 45 MG/1; MG/1
TABLET, MULTILAYER, EXTENDED RELEASE ORAL
COMMUNITY
Start: 2024-07-09

## 2024-07-31 RX ORDER — HYDROCORTISONE 10 MG/1
TABLET ORAL
COMMUNITY
Start: 2024-04-24

## 2024-08-07 RX ORDER — BETAMETHASONE SODIUM PHOSPHATE AND BETAMETHASONE ACETATE 3; 3 MG/ML; MG/ML
6 INJECTION, SUSPENSION INTRA-ARTICULAR; INTRALESIONAL; INTRAMUSCULAR; SOFT TISSUE ONCE
Status: SHIPPED | OUTPATIENT
Start: 2024-08-07

## 2024-08-07 NOTE — PROGRESS NOTES
[facility], [facility] PODIATRY [address] [address]  Dept: [phone]  Dept Fax: [phone]    RETURN PATIENT PROGRESS NOTE  Date of patient's visit: 8/7/2024  Patient's Name:  Yaima Casillas YOB: 1955            Patient Care Team:  Brooke Meade APRN - CNP as PCP - General (Nurse Practitioner)  Star Maria MD as Surgeon (Cardiothoracic Surgery)  Varun Negron MD as Consulting Physician (Pulmonology)  Corina Dwyer DPM as Physician (Podiatry)       Yaima KYLE Vinny 69 y.o. female that presents for follow-up of   Chief Complaint   Patient presents with    Foot Pain     Painful bilateral bunions      Pt's primary care physician is Brooke Meade APRN - CNP last seen 9/20/22  Symptoms began 6 - 7 month(s) ago and are increased to bilateral feet.  Patient relates pain is Present. States she has been walking more than usual in new shoes. States pain worse with walking. Pain is rated 7 out of 10 and is described as constant, mild, moderate.  Treatments prior to today's visit include: previous podiatry treatment with injection that helped significantly.  Currently denies F/C/N/V.     Allergies   Allergen Reactions    Latex      Other reaction(s): rash/itching    Other reaction(s): rash/itching    Biaxin [Clarithromycin] Hives    Ceclor [Cefaclor] Hives    Duricef [Cefadroxil] Hives    Medrol [Methylprednisolone] Other (See Comments)     Swelling of tongue.    Penicillins Hives    Percocet [Oxycodone-Acetaminophen] Shortness Of Breath     Only the     Prednisone Swelling     IV and oral. Makes tongue swell.     Hydromorphone Hcl      Other reaction(s): Vomiting    Albuterol Other (See Comments)     Burning in chest  Burning in chest    Hydrocodone-Acetaminophen Hives    Hydromorphone     Isoniazid Hives    Nickel      Other reaction(s): Allergy: RASH;    Nitrofurantoin Hives    Norco [Hydrocodone-Acetaminophen] Hives

## 2024-11-27 ENCOUNTER — HOSPITAL ENCOUNTER (OUTPATIENT)
Age: 69
Discharge: HOME | End: 2024-11-27
Payer: COMMERCIAL

## 2024-11-27 LAB — POTASSIUM SERPL-SCNC: 3.8 MMOL/L (ref 3.7–5.3)

## 2024-11-27 PROCEDURE — 84132 ASSAY OF SERUM POTASSIUM: CPT

## 2024-11-27 PROCEDURE — 36415 COLL VENOUS BLD VENIPUNCTURE: CPT

## 2024-12-15 NOTE — ED NOTES
Urine specimen obtained, unable to provide stool specimen. Pt ambulatory back to room 27 per self without difficulty.       Malina Arnold RN  12/03/17 2342 15-Dec-2024 18:08

## 2025-01-11 ENCOUNTER — HOSPITAL ENCOUNTER (OUTPATIENT)
Age: 70
Discharge: HOME | End: 2025-01-11
Payer: COMMERCIAL

## 2025-01-11 LAB
ALBUMIN SERPL-MCNC: 4.1 G/DL (ref 3.5–5.2)
ALBUMIN/GLOB SERPL: 1.5 {RATIO} (ref 1–2.5)
ALP SERPL-CCNC: 51 U/L (ref 35–104)
ALT SERPL-CCNC: 19 U/L (ref 10–35)
ANION GAP SERPL CALCULATED.3IONS-SCNC: 12 MMOL/L (ref 9–16)
AST SERPL-CCNC: 25 U/L (ref 10–35)
BILIRUB SERPL-MCNC: 0.3 MG/DL (ref 0–1.2)
BUN SERPL-MCNC: 21 MG/DL (ref 8–23)
CALCIUM SERPL-MCNC: 9.2 MG/DL (ref 8.6–10.4)
CHLORIDE SERPL-SCNC: 103 MMOL/L (ref 98–107)
CHOLEST SERPL-MCNC: 155 MG/DL (ref 0–199)
CHOLEST SERPL-MCNC: 155 MG/DL (ref 0–199)
CHOLESTEROL/HDL RATIO: 4.3
CO2 SERPL-SCNC: 25 MMOL/L (ref 20–31)
CREAT SERPL-MCNC: 1.2 MG/DL (ref 0.6–0.9)
ESTRADIOL LEVEL: 7.5 PG/ML
FSH SERPL-ACNC: 12 MIU/ML
GFR, ESTIMATED: 49 ML/MIN/1.73M2
GLUCOSE SERPL-MCNC: 94 MG/DL (ref 74–99)
HDLC SERPL-MCNC: 36 MG/DL
LDLC SERPL CALC-MCNC: 62 MG/DL (ref 0–100)
LDLC SERPL DIRECT ASSAY-MCNC: 64 MG/DL
LH SERPL-ACNC: 7.2 MIU/ML (ref 1.7–8.6)
POTASSIUM SERPL-SCNC: 3.6 MMOL/L (ref 3.7–5.3)
PROLACTIN SERPL-MCNC: 9.13 NG/ML (ref 4.79–23.3)
PROT SERPL-MCNC: 6.9 G/DL (ref 6.6–8.7)
SODIUM SERPL-SCNC: 140 MMOL/L (ref 136–145)
TRIGL SERPL-MCNC: 283 MG/DL
TRIGL SERPL-MCNC: 283 MG/DL (ref 0–149)
VLDLC SERPL CALC-MCNC: 57 MG/DL (ref 1–30)

## 2025-01-11 PROCEDURE — 82465 ASSAY BLD/SERUM CHOLESTEROL: CPT

## 2025-01-11 PROCEDURE — 36415 COLL VENOUS BLD VENIPUNCTURE: CPT

## 2025-01-11 PROCEDURE — 84478 ASSAY OF TRIGLYCERIDES: CPT

## 2025-01-11 PROCEDURE — 82670 ASSAY OF TOTAL ESTRADIOL: CPT

## 2025-01-11 PROCEDURE — 80053 COMPREHEN METABOLIC PANEL: CPT

## 2025-01-11 PROCEDURE — 83002 ASSAY OF GONADOTROPIN (LH): CPT

## 2025-01-11 PROCEDURE — 80061 LIPID PANEL: CPT

## 2025-01-11 PROCEDURE — 83721 ASSAY OF BLOOD LIPOPROTEIN: CPT

## 2025-01-11 PROCEDURE — 83695 ASSAY OF LIPOPROTEIN(A): CPT

## 2025-01-11 PROCEDURE — 84146 ASSAY OF PROLACTIN: CPT

## 2025-01-11 PROCEDURE — 83001 ASSAY OF GONADOTROPIN (FSH): CPT

## 2025-01-12 LAB — LPA SERPL-MCNC: 21 MG/DL

## 2025-01-14 ENCOUNTER — TRANSCRIBE ORDERS (OUTPATIENT)
Dept: ADMINISTRATIVE | Age: 70
End: 2025-01-14

## 2025-01-14 DIAGNOSIS — Z12.31 OTHER SCREENING MAMMOGRAM: ICD-10-CM

## 2025-01-14 DIAGNOSIS — M81.0 SENILE OSTEOPOROSIS: ICD-10-CM

## 2025-01-28 ENCOUNTER — HOSPITAL ENCOUNTER (OUTPATIENT)
Dept: MAMMOGRAPHY | Age: 70
Discharge: HOME | End: 2025-01-30
Payer: COMMERCIAL

## 2025-01-28 VITALS — HEIGHT: 65 IN | BODY MASS INDEX: 39.99 KG/M2 | WEIGHT: 240 LBS

## 2025-01-28 DIAGNOSIS — M81.0 SENILE OSTEOPOROSIS: ICD-10-CM

## 2025-01-28 DIAGNOSIS — Z12.31 OTHER SCREENING MAMMOGRAM: ICD-10-CM

## 2025-01-28 PROCEDURE — 77063 BREAST TOMOSYNTHESIS BI: CPT

## 2025-01-28 PROCEDURE — 77080 DXA BONE DENSITY AXIAL: CPT

## 2025-02-15 ENCOUNTER — HOSPITAL ENCOUNTER (OUTPATIENT)
Age: 70
Discharge: HOME | End: 2025-02-15
Payer: COMMERCIAL

## 2025-02-15 LAB
EST. AVERAGE GLUCOSE BLD GHB EST-MCNC: 105 MG/DL
HBA1C MFR BLD: 5.3 % (ref 4–6)
POTASSIUM SERPL-SCNC: 4.1 MMOL/L (ref 3.7–5.3)

## 2025-02-15 PROCEDURE — 36415 COLL VENOUS BLD VENIPUNCTURE: CPT

## 2025-02-15 PROCEDURE — 83036 HEMOGLOBIN GLYCOSYLATED A1C: CPT

## 2025-02-15 PROCEDURE — 84132 ASSAY OF SERUM POTASSIUM: CPT

## 2025-02-20 ENCOUNTER — INITIAL CONSULT (OUTPATIENT)
Dept: ONCOLOGY | Age: 70
End: 2025-02-20
Payer: COMMERCIAL

## 2025-02-20 ENCOUNTER — TELEPHONE (OUTPATIENT)
Dept: ONCOLOGY | Age: 70
End: 2025-02-20

## 2025-02-20 VITALS
TEMPERATURE: 97.9 F | SYSTOLIC BLOOD PRESSURE: 135 MMHG | HEART RATE: 54 BPM | HEIGHT: 65 IN | RESPIRATION RATE: 16 BRPM | DIASTOLIC BLOOD PRESSURE: 55 MMHG | WEIGHT: 228.3 LBS | BODY MASS INDEX: 38.04 KG/M2

## 2025-02-20 DIAGNOSIS — M81.0 AGE-RELATED OSTEOPOROSIS WITHOUT CURRENT PATHOLOGICAL FRACTURE: Primary | ICD-10-CM

## 2025-02-20 DIAGNOSIS — R53.82 CHRONIC FATIGUE, UNSPECIFIED: ICD-10-CM

## 2025-02-20 DIAGNOSIS — N18.31 CHRONIC RENAL IMPAIRMENT, STAGE 3A (HCC): ICD-10-CM

## 2025-02-20 PROCEDURE — 99202 OFFICE O/P NEW SF 15 MIN: CPT | Performed by: INTERNAL MEDICINE

## 2025-02-20 RX ORDER — HYDROCORTISONE SODIUM SUCCINATE 100 MG/2ML
100 INJECTION INTRAMUSCULAR; INTRAVENOUS
Status: CANCELLED | OUTPATIENT
Start: 2025-02-20

## 2025-02-20 RX ORDER — SODIUM CHLORIDE 9 MG/ML
5-250 INJECTION, SOLUTION INTRAVENOUS PRN
OUTPATIENT
Start: 2025-02-20

## 2025-02-20 RX ORDER — FAMOTIDINE 10 MG/ML
20 INJECTION, SOLUTION INTRAVENOUS
Status: CANCELLED | OUTPATIENT
Start: 2025-02-20

## 2025-02-20 RX ORDER — DIPHENHYDRAMINE HYDROCHLORIDE 50 MG/ML
50 INJECTION INTRAMUSCULAR; INTRAVENOUS
Status: CANCELLED | OUTPATIENT
Start: 2025-02-20

## 2025-02-20 RX ORDER — ZOLEDRONIC ACID 0.05 MG/ML
5 INJECTION, SOLUTION INTRAVENOUS ONCE
OUTPATIENT
Start: 2025-02-20 | End: 2025-02-20

## 2025-02-20 RX ORDER — ONDANSETRON 2 MG/ML
8 INJECTION INTRAMUSCULAR; INTRAVENOUS
Status: CANCELLED | OUTPATIENT
Start: 2025-02-20

## 2025-02-20 RX ORDER — EPINEPHRINE 1 MG/ML
0.3 INJECTION, SOLUTION, CONCENTRATE INTRAVENOUS PRN
Status: CANCELLED | OUTPATIENT
Start: 2025-02-20

## 2025-02-20 RX ORDER — ACETAMINOPHEN 325 MG/1
650 TABLET ORAL
Status: CANCELLED | OUTPATIENT
Start: 2025-02-20

## 2025-02-20 RX ORDER — ALBUTEROL SULFATE 90 UG/1
4 INHALANT RESPIRATORY (INHALATION) PRN
Status: CANCELLED | OUTPATIENT
Start: 2025-02-20

## 2025-02-20 RX ORDER — SODIUM CHLORIDE 9 MG/ML
INJECTION, SOLUTION INTRAVENOUS CONTINUOUS
Status: CANCELLED | OUTPATIENT
Start: 2025-02-20

## 2025-02-20 NOTE — PROGRESS NOTES
_           Ms. Yaima Casillas is a very pleasant 70 y.o. female with history of multiple co morbidities as listed.  Patient is referred for evaluation further management of osteoporosis.  Patient has chronic health problems as listed and chronic symptoms related to these abnormalities.  She had screening DEXA scan which showed evidence of osteoporosis.  Definite worsening compared to previous testing.  Currently she is on vitamin D and calcium.  She also follows regular exercises.  Never been on treatment for osteoporosis.  Patient has no other symptoms at the present time.  No history of bone fractures.  No history of smoking or alcohol drinking..       PAST MEDICAL HISTORY: has a past medical history of Allergic rhinitis, Arthritis, Asthma, Bunion of left foot, CAD (coronary artery disease), Chlorine inhalation lung injury, Chronic back pain, Cough variant asthma, Dyslipidemia, Dyspnea on exertion, Eczema, GERD (gastroesophageal reflux disease), Headache, MVP (mitral valve prolapse), Obesity, MARSHAL on CPAP, Poison ivy, Sleep apnea, Thoracic outlet syndrome, and Thyroid disease.    PAST SURGICAL HISTORY: has a past surgical history that includes Coronary artery bypass graft (08/06/2013); Cardiac catheterization; First rib removal; Tonsillectomy; Cholecystectomy; Hysterectomy; Total knee arthroplasty (Left); Varicose vein surgery; Carpal tunnel release (Right); shoulder surgery (Left); Ulnar tunnel release (Right); Knee cartilage surgery (Right); Total knee arthroplasty (Right, 03/2018); Cystocopy (04/10/2018); pr cystourethroscopy (N/A, 04/10/2018); Cataract removal with implant (Right, 08/07/2018); pr xcapsl ctrc rmvl insj io lens prosth w/o ecp (Right, 08/07/2018); Cataract removal with implant (Left, 08/28/2018); pr xcapsl ctrc rmvl insj io lens prosth w/o ecp (Left, 08/28/2018); Injection Procedure For Sacroiliac Joint

## 2025-02-20 NOTE — TELEPHONE ENCOUNTER
[facility] HERE FOR CONSULT   Start Prolia after authorization  PER DR. AKHIL ISAAC IN 1 YR   NEW ORDER PENDING PRECERT   MD VISIT: 2/19/26 @ 12PM   AVS PRINTED AND GIVEN ON EXIT

## 2025-02-24 DIAGNOSIS — M81.0 AGE-RELATED OSTEOPOROSIS WITHOUT CURRENT PATHOLOGICAL FRACTURE: Primary | ICD-10-CM

## 2025-03-05 ENCOUNTER — HOSPITAL ENCOUNTER (OUTPATIENT)
Facility: MEDICAL CENTER | Age: 70
Discharge: HOME | End: 2025-03-05
Payer: COMMERCIAL

## 2025-03-05 ENCOUNTER — HOSPITAL ENCOUNTER (OUTPATIENT)
Dept: INFUSION THERAPY | Facility: MEDICAL CENTER | Age: 70
Discharge: HOME | End: 2025-03-05
Payer: COMMERCIAL

## 2025-03-05 VITALS
HEART RATE: 49 BPM | SYSTOLIC BLOOD PRESSURE: 116 MMHG | DIASTOLIC BLOOD PRESSURE: 52 MMHG | TEMPERATURE: 97.7 F | RESPIRATION RATE: 16 BRPM

## 2025-03-05 DIAGNOSIS — M81.0 AGE-RELATED OSTEOPOROSIS WITHOUT CURRENT PATHOLOGICAL FRACTURE: Primary | ICD-10-CM

## 2025-03-05 DIAGNOSIS — M81.0 AGE-RELATED OSTEOPOROSIS WITHOUT CURRENT PATHOLOGICAL FRACTURE: ICD-10-CM

## 2025-03-05 DIAGNOSIS — R53.82 CHRONIC FATIGUE, UNSPECIFIED: ICD-10-CM

## 2025-03-05 LAB
ANION GAP SERPL CALCULATED.3IONS-SCNC: 13 MMOL/L (ref 9–16)
BUN SERPL-MCNC: 25 MG/DL (ref 8–23)
CALCIUM SERPL-MCNC: 9.1 MG/DL (ref 8.8–10.2)
CHLORIDE SERPL-SCNC: 103 MMOL/L (ref 98–107)
CO2 SERPL-SCNC: 23 MMOL/L (ref 20–31)
CREAT SERPL-MCNC: 1.5 MG/DL (ref 0.5–0.9)
GFR, ESTIMATED: 37 ML/MIN/1.73M2
GLUCOSE SERPL-MCNC: 95 MG/DL (ref 82–115)
POTASSIUM SERPL-SCNC: 4.3 MMOL/L (ref 3.7–5.3)
SODIUM SERPL-SCNC: 140 MMOL/L (ref 136–145)

## 2025-03-05 PROCEDURE — 36415 COLL VENOUS BLD VENIPUNCTURE: CPT

## 2025-03-05 PROCEDURE — 96375 TX/PRO/DX INJ NEW DRUG ADDON: CPT

## 2025-03-05 PROCEDURE — 2580000003 HC RX 258: Performed by: INTERNAL MEDICINE

## 2025-03-05 PROCEDURE — 6360000002 HC RX W HCPCS: Performed by: INTERNAL MEDICINE

## 2025-03-05 PROCEDURE — 96374 THER/PROPH/DIAG INJ IV PUSH: CPT

## 2025-03-05 PROCEDURE — 80048 BASIC METABOLIC PNL TOTAL CA: CPT

## 2025-03-05 RX ORDER — ZOLEDRONIC ACID 0.05 MG/ML
5 INJECTION, SOLUTION INTRAVENOUS ONCE
OUTPATIENT
Start: 2026-03-04 | End: 2026-03-04

## 2025-03-05 RX ORDER — SODIUM CHLORIDE 9 MG/ML
5-250 INJECTION, SOLUTION INTRAVENOUS PRN
OUTPATIENT
Start: 2026-03-04

## 2025-03-05 RX ORDER — SODIUM CHLORIDE 9 MG/ML
5-250 INJECTION, SOLUTION INTRAVENOUS PRN
Status: DISCONTINUED | OUTPATIENT
Start: 2025-03-05 | End: 2025-03-06 | Stop reason: HOSPADM

## 2025-03-05 RX ORDER — ZOLEDRONIC ACID 0.05 MG/ML
5 INJECTION, SOLUTION INTRAVENOUS ONCE
Status: COMPLETED | OUTPATIENT
Start: 2025-03-05 | End: 2025-03-05

## 2025-03-05 RX ADMIN — SODIUM CHLORIDE 100 ML/HR: 0.9 INJECTION, SOLUTION INTRAVENOUS at 14:48

## 2025-03-05 RX ADMIN — ZOLEDRONIC ACID 5 MG: 5 INJECTION INTRAVENOUS at 15:08

## 2025-03-05 NOTE — PROGRESS NOTES
Patient arrived ambulatory per self for first Reclast infusion.  Patient denies complaints or concerns. She denies dental or jaw issues.  IV inserted per unit protocol.  Labs reviewed. Creatinine 1.5. This is a significant increase from her usual baseline.  Dr. Felix notified. He states OK to treat but patient to be advised to stay well hydrated and will monitor labs.  Patient voices understanding.  Patient provided printed patient education on Reclast. Questions answered.  Reclast infused with no sign adverse reaction;line flushed.  IV removed with pressure dressing applied.  Patient ambulated off unit per self at discharge.

## 2025-04-09 ENCOUNTER — TRANSCRIBE ORDERS (OUTPATIENT)
Dept: ADMISSION | Age: 70
End: 2025-04-09

## 2025-04-09 ENCOUNTER — APPOINTMENT (OUTPATIENT)
Dept: LAB | Age: 70
End: 2025-04-09
Payer: COMMERCIAL

## 2025-04-09 ENCOUNTER — HOSPITAL ENCOUNTER (OUTPATIENT)
Dept: LAB | Age: 70
Discharge: HOME | End: 2025-04-09
Payer: COMMERCIAL

## 2025-04-09 DIAGNOSIS — R53.83 LETHARGY: ICD-10-CM

## 2025-04-09 DIAGNOSIS — R42 DIZZINESS AND GIDDINESS: Primary | ICD-10-CM

## 2025-04-09 DIAGNOSIS — I10 ESSENTIAL HYPERTENSION, MALIGNANT: Primary | ICD-10-CM

## 2025-04-09 LAB
BASOPHILS # BLD: 0.04 K/UL (ref 0–0.2)
BASOPHILS NFR BLD: 1 % (ref 0–2)
EOSINOPHIL # BLD: 0.17 K/UL (ref 0–0.44)
EOSINOPHILS RELATIVE PERCENT: 2 % (ref 1–4)
ERYTHROCYTE [DISTWIDTH] IN BLOOD BY AUTOMATED COUNT: 13.8 % (ref 11.8–14.4)
HCT VFR BLD AUTO: 38.7 % (ref 36.3–47.1)
HGB BLD-MCNC: 11.6 G/DL (ref 11.9–15.1)
IMM GRANULOCYTES # BLD AUTO: <0.03 K/UL (ref 0–0.3)
IMM GRANULOCYTES NFR BLD: 0 %
LYMPHOCYTES NFR BLD: 2.51 K/UL (ref 1.1–3.7)
LYMPHOCYTES RELATIVE PERCENT: 34 % (ref 24–43)
MCH RBC QN AUTO: 29.2 PG (ref 25.2–33.5)
MCHC RBC AUTO-ENTMCNC: 30 G/DL (ref 28.4–34.8)
MCV RBC AUTO: 97.5 FL (ref 82.6–102.9)
MONOCYTES NFR BLD: 0.56 K/UL (ref 0.1–1.2)
MONOCYTES NFR BLD: 8 % (ref 3–12)
NEUTROPHILS NFR BLD: 55 % (ref 36–65)
NEUTS SEG NFR BLD: 4.19 K/UL (ref 1.5–8.1)
NRBC BLD-RTO: 0 PER 100 WBC
PLATELET # BLD AUTO: 195 K/UL (ref 138–453)
PMV BLD AUTO: 12.4 FL (ref 8.1–13.5)
RBC # BLD AUTO: 3.97 M/UL (ref 3.95–5.11)
WBC OTHER # BLD: 7.5 K/UL (ref 3.5–11.3)

## 2025-04-09 PROCEDURE — 85025 COMPLETE CBC W/AUTO DIFF WBC: CPT

## 2025-04-09 PROCEDURE — 36415 COLL VENOUS BLD VENIPUNCTURE: CPT

## 2025-04-16 ENCOUNTER — HOSPITAL ENCOUNTER (OUTPATIENT)
Age: 70
Discharge: HOME | End: 2025-04-16
Payer: COMMERCIAL

## 2025-04-16 LAB
IRON SATN MFR SERPL: 14 % (ref 20–55)
IRON SERPL-MCNC: 63 UG/DL (ref 37–145)
TIBC SERPL-MCNC: 458 UG/DL (ref 250–450)
UNSATURATED IRON BINDING CAPACITY: 395 UG/DL (ref 112–347)

## 2025-04-16 PROCEDURE — 36415 COLL VENOUS BLD VENIPUNCTURE: CPT

## 2025-04-16 PROCEDURE — 83540 ASSAY OF IRON: CPT

## 2025-04-16 PROCEDURE — 83550 IRON BINDING TEST: CPT

## (undated) DEVICE — GLOVE SURG SZ 7 CRM LTX FREE POLYISOPRENE POLYMER BEAD ANTI

## (undated) DEVICE — TOWEL,OR,DSP,ST,WHITE,DLX,2/PK,40PK/CS: Brand: MEDLINE

## (undated) DEVICE — SURGICAL PROCEDURE PACK LT EYE CUST ST CHARLES STRL LF DISP

## (undated) DEVICE — NEEDLE SPINAL 22GA L3.5IN SPINOCAN

## (undated) DEVICE — BANDAGE ADH W1XL3IN UNIV PLAS NAT GEN USE STRP

## (undated) DEVICE — GLOVE SURG SZ 7 L12IN FNGR THK87MIL WHT LTX FREE

## (undated) DEVICE — Device

## (undated) DEVICE — LABEL MED EYE STRL

## (undated) DEVICE — TOWEL,OR,DSP,ST,BLUE,STD,6/PK,12PK/CS: Brand: MEDLINE

## (undated) DEVICE — Z DISCONTINUED APPLICATOR SURG PREP 0.35OZ 2% CHG 70% ISO ALC W/ HI LT

## (undated) DEVICE — TOWEL,OR,DSP,ST,BLUE,DLX,XR,4/PK,20PK/CS: Brand: MEDLINE

## (undated) DEVICE — GAUZE,SPONGE,4"X4",16PLY,XRAY,STRL,LF: Brand: MEDLINE

## (undated) DEVICE — GLOVE SURG SZ 85 CRM LTX FREE POLYISOPRENE POLYMER BEAD ANTI

## (undated) DEVICE — 1 ML TUBERCULIN SYRINGE LUER-LOCK TIP: Brand: MONOJECT

## (undated) DEVICE — SINGLE DOSE EPI TY

## (undated) DEVICE — SOLUTION IRRIGATION STRL H2O 1000 ML UROMATIC CONTAINER

## (undated) DEVICE — 1010 S-DRAPE TOWEL DRAPE 10/BX: Brand: STERI-DRAPE™

## (undated) DEVICE — APPLICATOR MEDICATED 26 CC SOLUTION HI LT ORNG CHLORAPREP

## (undated) DEVICE — NEEDLE SPNL L4.5IN OD22GA QNCKE TYP SPINOCAN

## (undated) DEVICE — COVER,MAYO STAND,STERILE: Brand: MEDLINE

## (undated) DEVICE — CURVED SHARP RF CANNULA, RADIOPAQUE MARKER: Brand: RADIOPAQUE RADIOFREQUENCY CANNULA

## (undated) DEVICE — GOWN,AURORA,NONREINFORCED,LARGE: Brand: MEDLINE

## (undated) DEVICE — SHIELD EYE PLAS CATRCT PT CARE

## (undated) DEVICE — GLOVE SURG SZ 75 STD WHT LTX SYN POLYMER BEAD REINF ANTI RL

## (undated) DEVICE — Z DUP USE 2522782 SOLUTION IRRIG 1000ML STRL H2O PLAS CONTAINER UROMATIC

## (undated) DEVICE — PREP SOL PVP IODINE 4%  4 OZ/BTL

## (undated) DEVICE — MARKER,SKIN,WI/RULER AND LABELS: Brand: MEDLINE

## (undated) DEVICE — SOLUTION IRRIGATION BAL SALT SOLUTION 500 ML STRL BSS

## (undated) DEVICE — Z DISCONTINUED BY MEDLINE USE 2711682 TRAY SKIN PREP DRY W/ PREM GLV

## (undated) DEVICE — DUP USE 240185 SOLUTION IV IRRIG WATER 1000ML IRRIG BAG 2B7114

## (undated) DEVICE — PAD ELECTROSURG HRVSTR CORD PATIENT

## (undated) DEVICE — TOWEL,OR,DSP,ST,BLUE,STD,4/PK,20PK/CS: Brand: MEDLINE

## (undated) DEVICE — SYRINGE 20ML LL S/C 50

## (undated) DEVICE — BANDAGE ADH W2XL4IN NITRL FAB STRP CURAD

## (undated) DEVICE — GLOVE SURG SZ 75 L12IN FNGR THK87MIL WHT LTX FREE

## (undated) DEVICE — NEEDLE SPNL 25GA L4 11/16IN BLU HUB DISP

## (undated) DEVICE — SINGLE SHOT EPIDURAL TRAY: Brand: MEDLINE INDUSTRIES, INC.